# Patient Record
Sex: MALE | Race: WHITE | NOT HISPANIC OR LATINO | Employment: OTHER
[De-identification: names, ages, dates, MRNs, and addresses within clinical notes are randomized per-mention and may not be internally consistent; named-entity substitution may affect disease eponyms.]

---

## 2019-01-01 ENCOUNTER — EXTERNAL RECORD (OUTPATIENT)
Dept: HEALTH INFORMATION MANAGEMENT | Facility: OTHER | Age: 79
End: 2019-01-01

## 2019-05-14 ENCOUNTER — TELEPHONE (OUTPATIENT)
Dept: NEUROSURGERY | Age: 79
End: 2019-05-14

## 2019-05-20 ENCOUNTER — TELEPHONE (OUTPATIENT)
Dept: NEUROSURGERY | Age: 79
End: 2019-05-20

## 2019-05-20 ENCOUNTER — OFFICE VISIT (OUTPATIENT)
Dept: NEUROSURGERY | Age: 79
End: 2019-05-20

## 2019-05-20 VITALS
HEIGHT: 67 IN | WEIGHT: 200 LBS | RESPIRATION RATE: 16 BRPM | BODY MASS INDEX: 31.39 KG/M2 | SYSTOLIC BLOOD PRESSURE: 149 MMHG | HEART RATE: 55 BPM | DIASTOLIC BLOOD PRESSURE: 78 MMHG | OXYGEN SATURATION: 100 %

## 2019-05-20 DIAGNOSIS — M48.061 SPINAL STENOSIS OF LUMBAR REGION WITHOUT NEUROGENIC CLAUDICATION: Primary | ICD-10-CM

## 2019-05-20 DIAGNOSIS — M51.36 DDD (DEGENERATIVE DISC DISEASE), LUMBAR: Primary | ICD-10-CM

## 2019-05-20 DIAGNOSIS — M54.30 SCIATICA, UNSPECIFIED LATERALITY: ICD-10-CM

## 2019-05-20 DIAGNOSIS — M54.16 LUMBAR RADICULOPATHY: Primary | ICD-10-CM

## 2019-05-20 PROBLEM — F32.A DEPRESSION: Status: ACTIVE | Noted: 2019-05-20

## 2019-05-20 PROBLEM — E03.9 HYPOTHYROIDISM: Status: ACTIVE | Noted: 2019-05-20

## 2019-05-20 PROBLEM — I10 ESSENTIAL HYPERTENSION: Status: ACTIVE | Noted: 2019-05-20

## 2019-05-20 PROBLEM — E11.9 TYPE 2 DIABETES MELLITUS (CMD): Status: ACTIVE | Noted: 2019-05-20

## 2019-05-20 PROBLEM — E78.5 HYPERLIPIDEMIA: Status: ACTIVE | Noted: 2019-05-20

## 2019-05-20 PROCEDURE — 99202 OFFICE O/P NEW SF 15 MIN: CPT | Performed by: NEUROLOGICAL SURGERY

## 2019-05-20 RX ORDER — LEVOTHYROXINE SODIUM 0.05 MG/1
1 TABLET ORAL
COMMUNITY
Start: 2018-04-08

## 2019-05-20 RX ORDER — CARVEDILOL 12.5 MG/1
12.5 TABLET ORAL 2 TIMES DAILY WITH MEALS
COMMUNITY
Start: 2017-06-30

## 2019-05-20 RX ORDER — PANTOPRAZOLE SODIUM 20 MG/1
20 TABLET, DELAYED RELEASE ORAL DAILY
COMMUNITY

## 2019-05-20 RX ORDER — LOSARTAN POTASSIUM 100 MG/1
100 TABLET ORAL DAILY
COMMUNITY
Start: 2014-10-31

## 2019-05-20 RX ORDER — MIRTAZAPINE 15 MG/1
7.5 TABLET, FILM COATED ORAL NIGHTLY
COMMUNITY
Start: 2019-07-02

## 2019-05-20 RX ORDER — HYDROCODONE BITARTRATE AND ACETAMINOPHEN 10; 325 MG/1; MG/1
TABLET ORAL
COMMUNITY
Start: 2019-05-15 | End: 2021-01-05 | Stop reason: ALTCHOICE

## 2019-05-20 RX ORDER — METFORMIN HYDROCHLORIDE 500 MG/1
500 TABLET, EXTENDED RELEASE ORAL
COMMUNITY
Start: 2018-03-22 | End: 2021-01-05 | Stop reason: ALTCHOICE

## 2019-05-20 RX ORDER — PRAVASTATIN SODIUM 40 MG
40 TABLET ORAL AT BEDTIME
COMMUNITY
Start: 2014-10-31

## 2019-05-20 RX ORDER — FLUOXETINE HYDROCHLORIDE 20 MG/1
40 CAPSULE ORAL DAILY
COMMUNITY
Start: 2019-07-02

## 2019-05-20 RX ORDER — TAMSULOSIN HYDROCHLORIDE 0.4 MG/1
0.4 CAPSULE ORAL AT BEDTIME
COMMUNITY
Start: 2017-06-30

## 2019-05-20 RX ORDER — HYDROCHLOROTHIAZIDE 25 MG/1
TABLET ORAL
COMMUNITY
Start: 2015-04-09

## 2019-05-22 ENCOUNTER — OFFICE VISIT (OUTPATIENT)
Dept: NEUROLOGY | Age: 79
End: 2019-05-22

## 2019-05-22 VITALS — DIASTOLIC BLOOD PRESSURE: 76 MMHG | SYSTOLIC BLOOD PRESSURE: 119 MMHG

## 2019-05-22 DIAGNOSIS — G62.9 POLYNEUROPATHY: Primary | ICD-10-CM

## 2019-05-22 DIAGNOSIS — G54.1 LUMBOSACRAL PLEXOPATHY: ICD-10-CM

## 2019-05-22 DIAGNOSIS — M54.16 LUMBAR RADICULOPATHY: ICD-10-CM

## 2019-05-22 PROCEDURE — 95910 NRV CNDJ TEST 7-8 STUDIES: CPT | Performed by: PSYCHIATRY & NEUROLOGY

## 2019-05-22 PROCEDURE — 95886 MUSC TEST DONE W/N TEST COMP: CPT | Performed by: PSYCHIATRY & NEUROLOGY

## 2019-05-23 ENCOUNTER — TELEPHONE (OUTPATIENT)
Dept: NEUROSURGERY | Age: 79
End: 2019-05-23

## 2019-05-24 ENCOUNTER — TELEPHONE (OUTPATIENT)
Dept: NEUROSURGERY | Age: 79
End: 2019-05-24

## 2019-05-24 DIAGNOSIS — M54.16 LEFT LUMBAR RADICULOPATHY: Primary | ICD-10-CM

## 2019-05-24 DIAGNOSIS — Z01.818 PRE-PROCEDURAL EXAMINATION: ICD-10-CM

## 2019-05-24 DIAGNOSIS — M79.605 LEFT LEG PAIN: ICD-10-CM

## 2019-05-28 ENCOUNTER — TELEPHONE (OUTPATIENT)
Dept: NEUROSURGERY | Age: 79
End: 2019-05-28

## 2019-05-29 ENCOUNTER — TELEPHONE (OUTPATIENT)
Dept: NEUROSURGERY | Age: 79
End: 2019-05-29

## 2019-06-03 ENCOUNTER — OFFICE VISIT (OUTPATIENT)
Dept: NEUROSURGERY | Age: 79
End: 2019-06-03

## 2019-06-03 VITALS
DIASTOLIC BLOOD PRESSURE: 100 MMHG | HEIGHT: 67 IN | RESPIRATION RATE: 16 BRPM | BODY MASS INDEX: 31.39 KG/M2 | WEIGHT: 200 LBS | OXYGEN SATURATION: 98 % | HEART RATE: 53 BPM | SYSTOLIC BLOOD PRESSURE: 183 MMHG

## 2019-06-03 DIAGNOSIS — M54.16 LUMBAR RADICULOPATHY: Primary | ICD-10-CM

## 2019-06-03 PROCEDURE — 99212 OFFICE O/P EST SF 10 MIN: CPT | Performed by: NEUROLOGICAL SURGERY

## 2019-06-10 ENCOUNTER — TELEPHONE (OUTPATIENT)
Dept: NEUROSURGERY | Age: 79
End: 2019-06-10

## 2019-06-14 ENCOUNTER — HOSPITAL (OUTPATIENT)
Dept: OTHER | Age: 79
End: 2019-06-14

## 2019-06-14 LAB
GLUCOSE BLDC GLUCOMTR-MCNC: 91 MG/DL (ref 65–99)
GLUCOSE BLDC GLUCOMTR-MCNC: NORMAL MG/DL

## 2019-06-14 PROCEDURE — 63047 LAM FACETEC & FORAMOT LUMBAR: CPT | Performed by: PHYSICIAN ASSISTANT

## 2019-06-14 PROCEDURE — 22633 ARTHRD CMBN 1NTRSPC LUMBAR: CPT | Performed by: NEUROLOGICAL SURGERY

## 2019-06-14 PROCEDURE — 99222 1ST HOSP IP/OBS MODERATE 55: CPT | Performed by: PHYSICAL MEDICINE & REHABILITATION

## 2019-06-14 PROCEDURE — 22842 INSERT SPINE FIXATION DEVICE: CPT | Performed by: PHYSICIAN ASSISTANT

## 2019-06-14 PROCEDURE — 22853 INSJ BIOMECHANICAL DEVICE: CPT | Performed by: PHYSICIAN ASSISTANT

## 2019-06-14 PROCEDURE — 99223 1ST HOSP IP/OBS HIGH 75: CPT | Performed by: INTERNAL MEDICINE

## 2019-06-14 PROCEDURE — 61783 SCAN PROC SPINAL: CPT | Performed by: NEUROLOGICAL SURGERY

## 2019-06-14 PROCEDURE — 22634 ARTHRD CMBN 1NTRSPC EA ADDL: CPT | Performed by: NEUROLOGICAL SURGERY

## 2019-06-14 PROCEDURE — 63047 LAM FACETEC & FORAMOT LUMBAR: CPT | Performed by: NEUROLOGICAL SURGERY

## 2019-06-14 PROCEDURE — 63048 LAM FACETEC &FORAMOT EA ADDL: CPT | Performed by: NEUROLOGICAL SURGERY

## 2019-06-14 PROCEDURE — 63048 LAM FACETEC &FORAMOT EA ADDL: CPT | Performed by: PHYSICIAN ASSISTANT

## 2019-06-14 PROCEDURE — 22633 ARTHRD CMBN 1NTRSPC LUMBAR: CPT | Performed by: PHYSICIAN ASSISTANT

## 2019-06-14 PROCEDURE — 22634 ARTHRD CMBN 1NTRSPC EA ADDL: CPT | Performed by: PHYSICIAN ASSISTANT

## 2019-06-14 PROCEDURE — 22853 INSJ BIOMECHANICAL DEVICE: CPT | Performed by: NEUROLOGICAL SURGERY

## 2019-06-14 PROCEDURE — 99024 POSTOP FOLLOW-UP VISIT: CPT | Performed by: PHYSICIAN ASSISTANT

## 2019-06-14 PROCEDURE — 22842 INSERT SPINE FIXATION DEVICE: CPT | Performed by: NEUROLOGICAL SURGERY

## 2019-06-14 PROCEDURE — 20937 SP BONE AGRFT MORSEL ADD-ON: CPT | Performed by: NEUROLOGICAL SURGERY

## 2019-06-15 LAB
ANALYZER ANC (IANC): ABNORMAL
ANION GAP SERPL CALC-SCNC: 7 MMOL/L (ref 10–20)
BUN SERPL-MCNC: 25 MG/DL (ref 6–20)
BUN/CREAT SERPL: 19 (ref 7–25)
CALCIUM SERPL-MCNC: 8.2 MG/DL (ref 8.4–10.2)
CHLORIDE SERPL-SCNC: 104 MMOL/L (ref 98–107)
CO2 SERPL-SCNC: 32 MMOL/L (ref 21–32)
CREAT SERPL-MCNC: 1.3 MG/DL (ref 0.67–1.17)
ERYTHROCYTE [DISTWIDTH] IN BLOOD: 13.2 % (ref 11–15)
GLUCOSE SERPL-MCNC: 103 MG/DL (ref 65–99)
HCT VFR BLD CALC: 39.2 % (ref 39–51)
HGB BLD-MCNC: 12.5 G/DL (ref 13–17)
MCH RBC QN AUTO: 30.1 PG (ref 26–34)
MCHC RBC AUTO-ENTMCNC: 31.9 G/DL (ref 32–36.5)
MCV RBC AUTO: 94.5 FL (ref 78–100)
NRBC (NRBCRE): 0 /100 WBC
PLATELET # BLD: 121 K/MCL (ref 140–450)
POTASSIUM SERPL-SCNC: 4.6 MMOL/L (ref 3.4–5.1)
RBC # BLD: 4.15 MIL/MCL (ref 4.5–5.9)
SODIUM SERPL-SCNC: 138 MMOL/L (ref 135–145)
WBC # BLD: 10.9 K/MCL (ref 4.2–11)

## 2019-06-15 PROCEDURE — 99232 SBSQ HOSP IP/OBS MODERATE 35: CPT | Performed by: INTERNAL MEDICINE

## 2019-06-15 PROCEDURE — 99024 POSTOP FOLLOW-UP VISIT: CPT | Performed by: NURSE PRACTITIONER

## 2019-06-16 LAB
ALBUMIN SERPL-MCNC: 2.6 G/DL (ref 3.6–5.1)
ALBUMIN/GLOB SERPL: 0.8 {RATIO} (ref 1–2.4)
ALP SERPL-CCNC: 120 UNITS/L (ref 45–117)
ALT SERPL-CCNC: 17 UNITS/L
ANALYZER ANC (IANC): ABNORMAL
ANION GAP SERPL CALC-SCNC: 9 MMOL/L (ref 10–20)
AST SERPL-CCNC: 25 UNITS/L
BILIRUB SERPL-MCNC: 0.7 MG/DL (ref 0.2–1)
BUN SERPL-MCNC: 25 MG/DL (ref 6–20)
BUN/CREAT SERPL: 23 (ref 7–25)
CALCIUM SERPL-MCNC: 8.8 MG/DL (ref 8.4–10.2)
CHLORIDE SERPL-SCNC: 101 MMOL/L (ref 98–107)
CHOLEST SERPL-MCNC: 147 MG/DL
CHOLEST SERPL-MCNC: ABNORMAL MG/DL
CHOLEST/HDLC SERPL: 4.3 {RATIO}
CO2 SERPL-SCNC: 30 MMOL/L (ref 21–32)
CREAT SERPL-MCNC: 1.07 MG/DL (ref 0.67–1.17)
ERYTHROCYTE [DISTWIDTH] IN BLOOD: 13 % (ref 11–15)
GLOBULIN SER-MCNC: 3.3 G/DL (ref 2–4)
GLUCOSE BLDC GLUCOMTR-MCNC: 118 MG/DL (ref 65–99)
GLUCOSE BLDC GLUCOMTR-MCNC: ABNORMAL MG/DL
GLUCOSE BLDC GLUCOMTR-MCNC: ABNORMAL MG/DL
GLUCOSE SERPL-MCNC: 111 MG/DL (ref 65–99)
HBA1C MFR BLD: 10.0           24 %
HBA1C MFR BLD: 5.4 % (ref 4.5–5.6)
HBA1C MFR BLD: 6.0            126 %
HBA1C MFR BLD: 6.5            14 %
HBA1C MFR BLD: 7.0            154 %
HBA1C MFR BLD: 7.5            169 %
HBA1C MFR BLD: 8.0            183 %
HBA1C MFR BLD: 8.5            197 %
HBA1C MFR BLD: 9.0            212 %
HBA1C MFR BLD: 9.5            226 %
HBA1C MFR BLD: NORMAL %
HCT VFR BLD CALC: 38.4 % (ref 39–51)
HDLC SERPL-MCNC: 34 MG/DL
HDLC SERPL-MCNC: ABNORMAL MG/DL
HGB BLD-MCNC: 12.4 G/DL (ref 13–17)
LDLC SERPL CALC-MCNC: 93 MG/DL
LDLC SERPL CALC-MCNC: ABNORMAL MG/DL
MCH RBC QN AUTO: 29.7 PG (ref 26–34)
MCHC RBC AUTO-ENTMCNC: 32.3 G/DL (ref 32–36.5)
MCV RBC AUTO: 91.9 FL (ref 78–100)
NONHDLC SERPL-MCNC: 113 MG/DL
NONHDLC SERPL-MCNC: ABNORMAL MG/DL
NRBC (NRBCRE): 0 /100 WBC
PLATELET # BLD: 103 K/MCL (ref 140–450)
POTASSIUM SERPL-SCNC: 3.7 MMOL/L (ref 3.4–5.1)
PROT SERPL-MCNC: 5.9 G/DL (ref 6.4–8.2)
RBC # BLD: 4.18 MIL/MCL (ref 4.5–5.9)
SODIUM SERPL-SCNC: 136 MMOL/L (ref 135–145)
TRIGL SERPL-MCNC: 101 MG/DL
TRIGL SERPL-MCNC: ABNORMAL MG/DL
TSH SERPL-ACNC: 1.41 MCUNITS/ML (ref 0.35–5)
WBC # BLD: 10.7 K/MCL (ref 4.2–11)

## 2019-06-16 PROCEDURE — 99024 POSTOP FOLLOW-UP VISIT: CPT | Performed by: PHYSICIAN ASSISTANT

## 2019-06-16 PROCEDURE — 99233 SBSQ HOSP IP/OBS HIGH 50: CPT | Performed by: INTERNAL MEDICINE

## 2019-06-17 ENCOUNTER — TELEPHONE (OUTPATIENT)
Dept: NEUROSURGERY | Age: 79
End: 2019-06-17

## 2019-06-17 ENCOUNTER — TELEPHONE (OUTPATIENT)
Dept: SCHEDULING | Age: 79
End: 2019-06-17

## 2019-06-17 LAB
AMORPH SED URNS QL MICRO: ABNORMAL
ANALYZER ANC (IANC): ABNORMAL
ANION GAP SERPL CALC-SCNC: 8 MMOL/L (ref 10–20)
APPEARANCE UR: CLEAR
BACTERIA #/AREA URNS HPF: ABNORMAL /HPF
BILIRUB UR QL: NEGATIVE
BUN SERPL-MCNC: 21 MG/DL (ref 6–20)
BUN/CREAT SERPL: 20 (ref 7–25)
CALCIUM SERPL-MCNC: 8.6 MG/DL (ref 8.4–10.2)
CAOX CRY URNS QL MICRO: ABNORMAL
CHLORIDE SERPL-SCNC: 102 MMOL/L (ref 98–107)
CO2 SERPL-SCNC: 32 MMOL/L (ref 21–32)
COLOR UR: YELLOW
CREAT SERPL-MCNC: 1.04 MG/DL (ref 0.67–1.17)
EPITH CASTS #/AREA URNS LPF: ABNORMAL /[LPF]
ERYTHROCYTE [DISTWIDTH] IN BLOOD: 13 % (ref 11–15)
FATTY CASTS #/AREA URNS LPF: ABNORMAL /[LPF]
GLUCOSE SERPL-MCNC: 133 MG/DL (ref 65–99)
GLUCOSE UR-MCNC: NEGATIVE MG/DL
GRAN CASTS #/AREA URNS LPF: ABNORMAL /[LPF]
HCT VFR BLD CALC: 34.3 % (ref 39–51)
HGB BLD-MCNC: 11.4 G/DL (ref 13–17)
HGB UR QL: ABNORMAL
HYALINE CASTS #/AREA URNS LPF: ABNORMAL /LPF (ref 0–5)
KETONES UR-MCNC: NEGATIVE MG/DL
LEUKOCYTE ESTERASE UR QL STRIP: NEGATIVE
MCH RBC QN AUTO: 30.2 PG (ref 26–34)
MCHC RBC AUTO-ENTMCNC: 33.2 G/DL (ref 32–36.5)
MCV RBC AUTO: 90.7 FL (ref 78–100)
MIXED CELL CASTS #/AREA URNS LPF: ABNORMAL /[LPF]
MUCOUS THREADS URNS QL MICRO: PRESENT
NITRITE UR QL: NEGATIVE
NRBC (NRBCRE): 0 /100 WBC
PH UR: 8 UNITS (ref 5–7)
PLATELET # BLD: 111 K/MCL (ref 140–450)
POTASSIUM SERPL-SCNC: 3.7 MMOL/L (ref 3.4–5.1)
PROT UR QL: NEGATIVE MG/DL
RBC # BLD: 3.78 MIL/MCL (ref 4.5–5.9)
RBC #/AREA URNS HPF: ABNORMAL /HPF (ref 0–2)
RBC CASTS #/AREA URNS LPF: ABNORMAL /[LPF]
RENAL EPI CELLS #/AREA URNS HPF: ABNORMAL /[HPF]
SODIUM SERPL-SCNC: 138 MMOL/L (ref 135–145)
SP GR UR: 1.01 (ref 1–1.03)
SPECIMEN SOURCE: ABNORMAL
SPERM URNS QL MICRO: ABNORMAL
SQUAMOUS #/AREA URNS HPF: ABNORMAL /HPF (ref 0–5)
T VAGINALIS URNS QL MICRO: ABNORMAL
TRI-PHOS CRY URNS QL MICRO: ABNORMAL
URATE CRY URNS QL MICRO: ABNORMAL
URINE REFLEX: ABNORMAL
URNS CMNT MICRO: ABNORMAL
UROBILINOGEN UR QL: 0.2 MG/DL (ref 0–1)
WAXY CASTS #/AREA URNS LPF: ABNORMAL /[LPF]
WBC # BLD: 10.2 K/MCL (ref 4.2–11)
WBC #/AREA URNS HPF: ABNORMAL /HPF (ref 0–5)
WBC CASTS #/AREA URNS LPF: ABNORMAL /[LPF]
YEAST HYPHAE URNS QL MICRO: ABNORMAL
YEAST URNS QL MICRO: ABNORMAL

## 2019-06-17 PROCEDURE — 99232 SBSQ HOSP IP/OBS MODERATE 35: CPT | Performed by: NURSE PRACTITIONER

## 2019-06-17 PROCEDURE — 99024 POSTOP FOLLOW-UP VISIT: CPT | Performed by: PHYSICIAN ASSISTANT

## 2019-06-17 PROCEDURE — 99233 SBSQ HOSP IP/OBS HIGH 50: CPT | Performed by: INTERNAL MEDICINE

## 2019-06-18 LAB
ALBUMIN SERPL-MCNC: 2.5 G/DL (ref 3.6–5.1)
ALP SERPL-CCNC: 109 UNITS/L (ref 45–117)
ALT SERPL-CCNC: 12 UNITS/L
ANALYZER ANC (IANC): ABNORMAL
ANION GAP SERPL CALC-SCNC: 10 MMOL/L (ref 10–20)
AST SERPL-CCNC: 22 UNITS/L
BILIRUB CONJ SERPL-MCNC: 0.1 MG/DL (ref 0–0.2)
BILIRUB SERPL-MCNC: 0.8 MG/DL (ref 0.2–1)
BUN SERPL-MCNC: 25 MG/DL (ref 6–20)
BUN/CREAT SERPL: 25 (ref 7–25)
CALCIUM SERPL-MCNC: 9.1 MG/DL (ref 8.4–10.2)
CHLORIDE SERPL-SCNC: 100 MMOL/L (ref 98–107)
CO2 SERPL-SCNC: 31 MMOL/L (ref 21–32)
CREAT SERPL-MCNC: 0.99 MG/DL (ref 0.67–1.17)
ERYTHROCYTE [DISTWIDTH] IN BLOOD: 13.2 % (ref 11–15)
GLUCOSE SERPL-MCNC: 114 MG/DL (ref 65–99)
HCT VFR BLD CALC: 37.5 % (ref 39–51)
HGB BLD-MCNC: 12.4 G/DL (ref 13–17)
MCH RBC QN AUTO: 30.1 PG (ref 26–34)
MCHC RBC AUTO-ENTMCNC: 33.1 G/DL (ref 32–36.5)
MCV RBC AUTO: 91 FL (ref 78–100)
NRBC (NRBCRE): 0 /100 WBC
PLATELET # BLD: 171 K/MCL (ref 140–450)
POTASSIUM SERPL-SCNC: 3.5 MMOL/L (ref 3.4–5.1)
PROT SERPL-MCNC: 6.8 G/DL (ref 6.4–8.2)
RBC # BLD: 4.12 MIL/MCL (ref 4.5–5.9)
SODIUM SERPL-SCNC: 137 MMOL/L (ref 135–145)
WBC # BLD: 9.5 K/MCL (ref 4.2–11)

## 2019-06-18 PROCEDURE — 99232 SBSQ HOSP IP/OBS MODERATE 35: CPT | Performed by: INTERNAL MEDICINE

## 2019-06-19 LAB
ANALYZER ANC (IANC): ABNORMAL
ANION GAP SERPL CALC-SCNC: 8 MMOL/L (ref 10–20)
BUN SERPL-MCNC: 25 MG/DL (ref 6–20)
BUN/CREAT SERPL: 24 (ref 7–25)
CALCIUM SERPL-MCNC: 9.2 MG/DL (ref 8.4–10.2)
CHLORIDE SERPL-SCNC: 99 MMOL/L (ref 98–107)
CO2 SERPL-SCNC: 31 MMOL/L (ref 21–32)
CREAT SERPL-MCNC: 1.04 MG/DL (ref 0.67–1.17)
ERYTHROCYTE [DISTWIDTH] IN BLOOD: 13.2 % (ref 11–15)
GLUCOSE SERPL-MCNC: 108 MG/DL (ref 65–99)
HCT VFR BLD CALC: 38 % (ref 39–51)
HGB BLD-MCNC: 12.4 G/DL (ref 13–17)
MAGNESIUM SERPL-MCNC: 2.7 MG/DL (ref 1.7–2.4)
MCH RBC QN AUTO: 29.6 PG (ref 26–34)
MCHC RBC AUTO-ENTMCNC: 32.6 G/DL (ref 32–36.5)
MCV RBC AUTO: 90.7 FL (ref 78–100)
NRBC (NRBCRE): 0 /100 WBC
PLATELET # BLD: 193 K/MCL (ref 140–450)
POTASSIUM SERPL-SCNC: 3.3 MMOL/L (ref 3.4–5.1)
RBC # BLD: 4.19 MIL/MCL (ref 4.5–5.9)
SODIUM SERPL-SCNC: 135 MMOL/L (ref 135–145)
WBC # BLD: 8.2 K/MCL (ref 4.2–11)

## 2019-06-19 PROCEDURE — 99232 SBSQ HOSP IP/OBS MODERATE 35: CPT | Performed by: INTERNAL MEDICINE

## 2019-06-19 PROCEDURE — 99233 SBSQ HOSP IP/OBS HIGH 50: CPT | Performed by: NURSE PRACTITIONER

## 2019-06-20 LAB
CREAT SERPL-MCNC: 1.14 MG/DL (ref 0.67–1.17)
POTASSIUM SERPL-SCNC: 3.7 MMOL/L (ref 3.4–5.1)

## 2019-06-20 PROCEDURE — 99239 HOSP IP/OBS DSCHRG MGMT >30: CPT | Performed by: INTERNAL MEDICINE

## 2019-06-20 PROCEDURE — 99232 SBSQ HOSP IP/OBS MODERATE 35: CPT | Performed by: PHYSICAL MEDICINE & REHABILITATION

## 2019-06-20 PROCEDURE — 99223 1ST HOSP IP/OBS HIGH 75: CPT | Performed by: PHYSICAL MEDICINE & REHABILITATION

## 2019-06-20 PROCEDURE — 99223 1ST HOSP IP/OBS HIGH 75: CPT | Performed by: INTERNAL MEDICINE

## 2019-06-21 ENCOUNTER — TELEPHONE (OUTPATIENT)
Dept: NEUROSURGERY | Age: 79
End: 2019-06-21

## 2019-06-21 LAB
ANALYZER ANC (IANC): ABNORMAL
ANION GAP SERPL CALC-SCNC: 8 MMOL/L (ref 10–20)
BASOPHILS # BLD: 0 K/MCL (ref 0–0.3)
BASOPHILS NFR BLD: 0 %
BUN SERPL-MCNC: 27 MG/DL (ref 6–20)
BUN/CREAT SERPL: 26 (ref 7–25)
CALCIUM SERPL-MCNC: 8.9 MG/DL (ref 8.4–10.2)
CHLORIDE SERPL-SCNC: 100 MMOL/L (ref 98–107)
CO2 SERPL-SCNC: 32 MMOL/L (ref 21–32)
CREAT SERPL-MCNC: 1.04 MG/DL (ref 0.67–1.17)
DIFFERENTIAL METHOD BLD: ABNORMAL
EOSINOPHIL # BLD: 0.4 K/MCL (ref 0.1–0.5)
EOSINOPHIL NFR BLD: 5 %
ERYTHROCYTE [DISTWIDTH] IN BLOOD: 13.2 % (ref 11–15)
GLUCOSE SERPL-MCNC: 104 MG/DL (ref 65–99)
HCT VFR BLD CALC: 37.7 % (ref 39–51)
HGB BLD-MCNC: 11.8 G/DL (ref 13–17)
IMM GRANULOCYTES # BLD AUTO: 0.1 K/MCL (ref 0–0.2)
IMM GRANULOCYTES NFR BLD: 1 %
LYMPHOCYTES # BLD: 1.6 K/MCL (ref 1–4)
LYMPHOCYTES NFR BLD: 20 %
MCH RBC QN AUTO: 29.1 PG (ref 26–34)
MCHC RBC AUTO-ENTMCNC: 31.3 G/DL (ref 32–36.5)
MCV RBC AUTO: 93.1 FL (ref 78–100)
MONOCYTES # BLD: 1 K/MCL (ref 0.3–0.9)
MONOCYTES NFR BLD: 12 %
NEUTROPHILS # BLD: 5 K/MCL (ref 1.8–7.7)
NEUTROPHILS NFR BLD: 62 %
NEUTS SEG NFR BLD: ABNORMAL %
NRBC (NRBCRE): 0 /100 WBC
PLATELET # BLD: 221 K/MCL (ref 140–450)
POTASSIUM SERPL-SCNC: 3.5 MMOL/L (ref 3.4–5.1)
RBC # BLD: 4.05 MIL/MCL (ref 4.5–5.9)
SODIUM SERPL-SCNC: 137 MMOL/L (ref 135–145)
WBC # BLD: 8.2 K/MCL (ref 4.2–11)

## 2019-06-21 PROCEDURE — 99233 SBSQ HOSP IP/OBS HIGH 50: CPT | Performed by: SPECIALIST

## 2019-06-21 PROCEDURE — 99233 SBSQ HOSP IP/OBS HIGH 50: CPT | Performed by: INTERNAL MEDICINE

## 2019-06-22 LAB
GLUCOSE BLDC GLUCOMTR-MCNC: 110 MG/DL (ref 65–99)
GLUCOSE BLDC GLUCOMTR-MCNC: 89 MG/DL (ref 65–99)

## 2019-06-22 PROCEDURE — 99231 SBSQ HOSP IP/OBS SF/LOW 25: CPT | Performed by: SPECIALIST

## 2019-06-22 PROCEDURE — 99232 SBSQ HOSP IP/OBS MODERATE 35: CPT | Performed by: INTERNAL MEDICINE

## 2019-06-23 LAB
GLUCOSE BLDC GLUCOMTR-MCNC: 103 MG/DL (ref 65–99)
GLUCOSE BLDC GLUCOMTR-MCNC: 107 MG/DL (ref 65–99)
GLUCOSE BLDC GLUCOMTR-MCNC: 135 MG/DL (ref 65–99)
GLUCOSE BLDC GLUCOMTR-MCNC: 142 MG/DL (ref 65–99)
GLUCOSE BLDC GLUCOMTR-MCNC: ABNORMAL MG/DL

## 2019-06-23 PROCEDURE — 99232 SBSQ HOSP IP/OBS MODERATE 35: CPT | Performed by: SPECIALIST

## 2019-06-24 LAB
GLUCOSE BLDC GLUCOMTR-MCNC: 91 MG/DL (ref 65–99)
GLUCOSE BLDC GLUCOMTR-MCNC: 93 MG/DL (ref 65–99)

## 2019-06-24 PROCEDURE — 99232 SBSQ HOSP IP/OBS MODERATE 35: CPT | Performed by: INTERNAL MEDICINE

## 2019-06-24 PROCEDURE — 99233 SBSQ HOSP IP/OBS HIGH 50: CPT | Performed by: PHYSICAL MEDICINE & REHABILITATION

## 2019-06-25 LAB
ANALYZER ANC (IANC): ABNORMAL
ANION GAP SERPL CALC-SCNC: 10 MMOL/L (ref 10–20)
BUN SERPL-MCNC: 34 MG/DL (ref 6–20)
BUN/CREAT SERPL: 26 (ref 7–25)
CALCIUM SERPL-MCNC: 8.5 MG/DL (ref 8.4–10.2)
CHLORIDE SERPL-SCNC: 105 MMOL/L (ref 98–107)
CO2 SERPL-SCNC: 27 MMOL/L (ref 21–32)
CREAT SERPL-MCNC: 1.3 MG/DL (ref 0.67–1.17)
ERYTHROCYTE [DISTWIDTH] IN BLOOD: 13 % (ref 11–15)
GLUCOSE BLDC GLUCOMTR-MCNC: 109 MG/DL (ref 65–99)
GLUCOSE BLDC GLUCOMTR-MCNC: 93 MG/DL (ref 65–99)
GLUCOSE BLDC GLUCOMTR-MCNC: NORMAL MG/DL
GLUCOSE SERPL-MCNC: 96 MG/DL (ref 65–99)
HCT VFR BLD CALC: 37.1 % (ref 39–51)
HGB BLD-MCNC: 11.4 G/DL (ref 13–17)
MCH RBC QN AUTO: 29.1 PG (ref 26–34)
MCHC RBC AUTO-ENTMCNC: 30.7 G/DL (ref 32–36.5)
MCV RBC AUTO: 94.6 FL (ref 78–100)
NRBC (NRBCRE): 0 /100 WBC
PLATELET # BLD: 280 K/MCL (ref 140–450)
POTASSIUM SERPL-SCNC: 3.7 MMOL/L (ref 3.4–5.1)
RBC # BLD: 3.92 MIL/MCL (ref 4.5–5.9)
SODIUM SERPL-SCNC: 138 MMOL/L (ref 135–145)
WBC # BLD: 9.5 K/MCL (ref 4.2–11)

## 2019-06-25 PROCEDURE — 99232 SBSQ HOSP IP/OBS MODERATE 35: CPT | Performed by: INTERNAL MEDICINE

## 2019-06-25 PROCEDURE — 99232 SBSQ HOSP IP/OBS MODERATE 35: CPT | Performed by: PHYSICAL MEDICINE & REHABILITATION

## 2019-06-26 ENCOUNTER — APPOINTMENT (OUTPATIENT)
Dept: NEUROSURGERY | Age: 79
End: 2019-06-26

## 2019-06-26 LAB
GLUCOSE BLDC GLUCOMTR-MCNC: 93 MG/DL (ref 65–99)
GLUCOSE BLDC GLUCOMTR-MCNC: 94 MG/DL (ref 65–99)

## 2019-06-26 PROCEDURE — 99232 SBSQ HOSP IP/OBS MODERATE 35: CPT | Performed by: PHYSICAL MEDICINE & REHABILITATION

## 2019-06-27 LAB
GLUCOSE BLDC GLUCOMTR-MCNC: 102 MG/DL (ref 65–99)
GLUCOSE BLDC GLUCOMTR-MCNC: 114 MG/DL (ref 65–99)
GLUCOSE BLDC GLUCOMTR-MCNC: ABNORMAL MG/DL

## 2019-06-27 PROCEDURE — 99232 SBSQ HOSP IP/OBS MODERATE 35: CPT | Performed by: PHYSICAL MEDICINE & REHABILITATION

## 2019-06-28 LAB
GLUCOSE BLDC GLUCOMTR-MCNC: 123 MG/DL (ref 65–99)
GLUCOSE BLDC GLUCOMTR-MCNC: 97 MG/DL (ref 65–99)

## 2019-06-28 PROCEDURE — 99232 SBSQ HOSP IP/OBS MODERATE 35: CPT | Performed by: PHYSICAL MEDICINE & REHABILITATION

## 2019-06-28 PROCEDURE — 99232 SBSQ HOSP IP/OBS MODERATE 35: CPT | Performed by: INTERNAL MEDICINE

## 2019-06-29 LAB
GLUCOSE BLDC GLUCOMTR-MCNC: 97 MG/DL (ref 65–99)
GLUCOSE BLDC GLUCOMTR-MCNC: 97 MG/DL (ref 65–99)
GLUCOSE BLDC GLUCOMTR-MCNC: NORMAL MG/DL

## 2019-06-29 PROCEDURE — 99232 SBSQ HOSP IP/OBS MODERATE 35: CPT | Performed by: PHYSICAL MEDICINE & REHABILITATION

## 2019-06-30 LAB
GLUCOSE BLDC GLUCOMTR-MCNC: 109 MG/DL (ref 65–99)
GLUCOSE BLDC GLUCOMTR-MCNC: 95 MG/DL (ref 65–99)

## 2019-06-30 PROCEDURE — 99232 SBSQ HOSP IP/OBS MODERATE 35: CPT | Performed by: PHYSICAL MEDICINE & REHABILITATION

## 2019-06-30 PROCEDURE — 99232 SBSQ HOSP IP/OBS MODERATE 35: CPT | Performed by: INTERNAL MEDICINE

## 2019-07-01 ENCOUNTER — TELEPHONE (OUTPATIENT)
Dept: NEUROSURGERY | Age: 79
End: 2019-07-01

## 2019-07-01 LAB — GLUCOSE BLDC GLUCOMTR-MCNC: 91 MG/DL (ref 65–99)

## 2019-07-01 PROCEDURE — 99239 HOSP IP/OBS DSCHRG MGMT >30: CPT | Performed by: PHYSICAL MEDICINE & REHABILITATION

## 2019-07-01 PROCEDURE — 99232 SBSQ HOSP IP/OBS MODERATE 35: CPT | Performed by: INTERNAL MEDICINE

## 2019-07-08 ENCOUNTER — TELEPHONE (OUTPATIENT)
Dept: NEUROSURGERY | Age: 79
End: 2019-07-08

## 2019-07-12 ENCOUNTER — TELEPHONE (OUTPATIENT)
Dept: NEUROSURGERY | Age: 79
End: 2019-07-12

## 2019-07-17 ENCOUNTER — OFFICE VISIT (OUTPATIENT)
Dept: NEUROSURGERY | Age: 79
End: 2019-07-17

## 2019-07-17 ENCOUNTER — HOSPITAL (OUTPATIENT)
Dept: OTHER | Age: 79
End: 2019-07-17
Attending: NEUROLOGICAL SURGERY

## 2019-07-17 DIAGNOSIS — Z98.1 S/P LUMBAR SPINAL FUSION: Primary | ICD-10-CM

## 2019-07-17 PROCEDURE — 99024 POSTOP FOLLOW-UP VISIT: CPT | Performed by: NURSE PRACTITIONER

## 2019-07-18 VITALS — SYSTOLIC BLOOD PRESSURE: 131 MMHG | HEART RATE: 89 BPM | DIASTOLIC BLOOD PRESSURE: 78 MMHG

## 2019-07-19 ENCOUNTER — TELEPHONE (OUTPATIENT)
Dept: NEUROSURGERY | Age: 79
End: 2019-07-19

## 2019-07-19 ENCOUNTER — E-ADVICE (OUTPATIENT)
Dept: NEUROSURGERY | Age: 79
End: 2019-07-19

## 2019-07-26 ENCOUNTER — APPOINTMENT (OUTPATIENT)
Dept: NEUROSURGERY | Age: 79
End: 2019-07-26

## 2019-07-29 ENCOUNTER — E-ADVICE (OUTPATIENT)
Dept: NEUROSURGERY | Age: 79
End: 2019-07-29

## 2019-08-09 ENCOUNTER — OFFICE VISIT (OUTPATIENT)
Dept: NEUROSURGERY | Age: 79
End: 2019-08-09

## 2019-08-09 VITALS — HEART RATE: 71 BPM | SYSTOLIC BLOOD PRESSURE: 152 MMHG | DIASTOLIC BLOOD PRESSURE: 80 MMHG | TEMPERATURE: 97.6 F

## 2019-08-09 DIAGNOSIS — Z98.1 S/P LUMBAR FUSION: Primary | ICD-10-CM

## 2019-08-09 PROCEDURE — 99024 POSTOP FOLLOW-UP VISIT: CPT | Performed by: NEUROLOGICAL SURGERY

## 2019-08-27 ENCOUNTER — TELEPHONE (OUTPATIENT)
Dept: NEUROSURGERY | Age: 79
End: 2019-08-27

## 2019-09-11 ENCOUNTER — HOSPITAL (OUTPATIENT)
Dept: OTHER | Age: 79
End: 2019-09-11
Attending: INTERNAL MEDICINE

## 2019-09-11 ENCOUNTER — HOSPITAL (OUTPATIENT)
Dept: OTHER | Age: 79
End: 2019-09-11
Attending: PHYSICAL MEDICINE & REHABILITATION

## 2019-09-11 ENCOUNTER — HOSPITAL (OUTPATIENT)
Dept: OTHER | Age: 79
End: 2019-09-11
Attending: NEUROLOGICAL SURGERY

## 2019-09-13 ENCOUNTER — TELEPHONE (OUTPATIENT)
Dept: SCHEDULING | Age: 79
End: 2019-09-13

## 2019-09-19 ENCOUNTER — APPOINTMENT (OUTPATIENT)
Dept: NEUROSURGERY | Age: 79
End: 2019-09-19

## 2019-09-26 ENCOUNTER — APPOINTMENT (OUTPATIENT)
Dept: NEUROSURGERY | Age: 79
End: 2019-09-26

## 2019-10-01 ENCOUNTER — TELEPHONE (OUTPATIENT)
Dept: NEUROSURGERY | Age: 79
End: 2019-10-01

## 2019-10-03 ENCOUNTER — APPOINTMENT (OUTPATIENT)
Dept: NEUROSURGERY | Age: 79
End: 2019-10-03

## 2019-10-09 ENCOUNTER — E-ADVICE (OUTPATIENT)
Dept: NEUROSURGERY | Age: 79
End: 2019-10-09

## 2019-10-09 ENCOUNTER — TELEPHONE (OUTPATIENT)
Dept: NEUROSURGERY | Age: 79
End: 2019-10-09

## 2019-10-09 DIAGNOSIS — M25.552 PAIN OF LEFT HIP JOINT: ICD-10-CM

## 2019-10-09 DIAGNOSIS — M54.16 ACUTE LEFT LUMBAR RADICULOPATHY: ICD-10-CM

## 2019-10-09 DIAGNOSIS — Z98.1 S/P LUMBAR FUSION: Primary | ICD-10-CM

## 2019-10-10 ENCOUNTER — TELEPHONE (OUTPATIENT)
Dept: NEUROSURGERY | Age: 79
End: 2019-10-10

## 2019-10-11 ENCOUNTER — OFFICE VISIT (OUTPATIENT)
Dept: NEUROSURGERY | Age: 79
End: 2019-10-11

## 2019-10-11 VITALS — SYSTOLIC BLOOD PRESSURE: 205 MMHG | DIASTOLIC BLOOD PRESSURE: 100 MMHG

## 2019-10-11 DIAGNOSIS — M54.16 LUMBAR RADICULOPATHY: ICD-10-CM

## 2019-10-11 DIAGNOSIS — Z98.1 S/P LUMBAR FUSION: Primary | ICD-10-CM

## 2019-10-11 PROCEDURE — 99213 OFFICE O/P EST LOW 20 MIN: CPT | Performed by: NEUROLOGICAL SURGERY

## 2019-10-11 RX ORDER — METHYLPREDNISOLONE 4 MG/1
4 TABLET ORAL SEE ADMIN INSTRUCTIONS
Qty: 21 TABLET | Refills: 0 | Status: SHIPPED | OUTPATIENT
Start: 2019-10-11 | End: 2021-01-05 | Stop reason: ALTCHOICE

## 2019-10-17 ENCOUNTER — TELEPHONE (OUTPATIENT)
Dept: NEUROSURGERY | Age: 79
End: 2019-10-17

## 2019-10-18 ENCOUNTER — TELEPHONE (OUTPATIENT)
Dept: NEUROSURGERY | Age: 79
End: 2019-10-18

## 2019-10-29 ENCOUNTER — TELEPHONE (OUTPATIENT)
Dept: NEUROSURGERY | Age: 79
End: 2019-10-29

## 2019-10-29 ENCOUNTER — E-ADVICE (OUTPATIENT)
Dept: NEUROSURGERY | Age: 79
End: 2019-10-29

## 2019-11-11 ENCOUNTER — TELEPHONE (OUTPATIENT)
Dept: NEUROSURGERY | Age: 79
End: 2019-11-11

## 2019-12-30 ENCOUNTER — LAB ENCOUNTER (OUTPATIENT)
Dept: LAB | Facility: HOSPITAL | Age: 79
End: 2019-12-30
Attending: ORTHOPAEDIC SURGERY
Payer: MEDICARE

## 2019-12-30 DIAGNOSIS — Z01.818 PREOPERATIVE TESTING: ICD-10-CM

## 2019-12-30 LAB
ANTIBODY SCREEN: NEGATIVE
RH BLOOD TYPE: POSITIVE

## 2019-12-30 PROCEDURE — 86900 BLOOD TYPING SEROLOGIC ABO: CPT

## 2019-12-30 PROCEDURE — 86850 RBC ANTIBODY SCREEN: CPT

## 2019-12-30 PROCEDURE — 36415 COLL VENOUS BLD VENIPUNCTURE: CPT

## 2019-12-30 PROCEDURE — 86901 BLOOD TYPING SEROLOGIC RH(D): CPT

## 2020-01-02 ENCOUNTER — TELEPHONE (OUTPATIENT)
Dept: NEUROSURGERY | Age: 80
End: 2020-01-02

## 2020-01-02 ENCOUNTER — E-ADVICE (OUTPATIENT)
Dept: NEUROSURGERY | Age: 80
End: 2020-01-02

## 2020-01-06 RX ORDER — VIT A/VIT C/VIT E/ZINC/COPPER 4296-226
CAPSULE ORAL DAILY
COMMUNITY

## 2020-01-06 RX ORDER — LEVOTHYROXINE SODIUM 0.05 MG/1
50 TABLET ORAL
COMMUNITY

## 2020-01-06 RX ORDER — METFORMIN HYDROCHLORIDE 500 MG/1
500 TABLET, EXTENDED RELEASE ORAL
Status: ON HOLD | COMMUNITY
End: 2020-02-04

## 2020-01-06 RX ORDER — DULOXETIN HYDROCHLORIDE 30 MG/1
30 CAPSULE, DELAYED RELEASE ORAL DAILY
COMMUNITY

## 2020-01-06 RX ORDER — LOSARTAN POTASSIUM 100 MG/1
TABLET ORAL DAILY
Status: ON HOLD | COMMUNITY
End: 2020-02-04

## 2020-01-06 RX ORDER — CARVEDILOL 12.5 MG/1
12.5 TABLET ORAL 2 TIMES DAILY WITH MEALS
Status: ON HOLD | COMMUNITY
End: 2020-02-04

## 2020-01-06 RX ORDER — ACETAMINOPHEN 160 MG
2000 TABLET,DISINTEGRATING ORAL DAILY
Status: ON HOLD | COMMUNITY
End: 2020-02-04

## 2020-01-06 RX ORDER — TAMSULOSIN HYDROCHLORIDE 0.4 MG/1
0.8 CAPSULE ORAL DAILY
COMMUNITY

## 2020-01-06 RX ORDER — DONEPEZIL HYDROCHLORIDE 10 MG/1
10 TABLET, FILM COATED ORAL NIGHTLY
COMMUNITY

## 2020-01-06 RX ORDER — PRAVASTATIN SODIUM 40 MG
40 TABLET ORAL NIGHTLY
COMMUNITY

## 2020-01-06 RX ORDER — GARLIC EXTRACT 500 MG
1 CAPSULE ORAL DAILY
Status: ON HOLD | COMMUNITY
End: 2020-02-04

## 2020-01-06 RX ORDER — HYDROCHLOROTHIAZIDE 25 MG/1
25 TABLET ORAL DAILY
Status: ON HOLD | COMMUNITY
End: 2020-02-04

## 2020-01-06 RX ORDER — PANTOPRAZOLE SODIUM 20 MG/1
20 TABLET, DELAYED RELEASE ORAL
Status: ON HOLD | COMMUNITY
End: 2020-02-04

## 2020-01-06 RX ORDER — ONDANSETRON HYDROCHLORIDE 8 MG/1
8 TABLET, FILM COATED ORAL EVERY 8 HOURS PRN
Status: ON HOLD | COMMUNITY
End: 2020-02-04

## 2020-01-06 RX ORDER — OXYCODONE HYDROCHLORIDE 10 MG/1
10 TABLET ORAL EVERY 6 HOURS PRN
Status: ON HOLD | COMMUNITY
End: 2020-02-04

## 2020-01-06 RX ORDER — MIRTAZAPINE 30 MG/1
15 TABLET, FILM COATED ORAL NIGHTLY
Status: ON HOLD | COMMUNITY
End: 2020-02-04

## 2020-01-10 ENCOUNTER — ANESTHESIA EVENT (OUTPATIENT)
Dept: SURGERY | Facility: HOSPITAL | Age: 80
DRG: 003 | End: 2020-01-10
Payer: MEDICARE

## 2020-01-10 ENCOUNTER — APPOINTMENT (OUTPATIENT)
Dept: GENERAL RADIOLOGY | Facility: HOSPITAL | Age: 80
DRG: 003 | End: 2020-01-10
Attending: NURSE PRACTITIONER
Payer: MEDICARE

## 2020-01-10 ENCOUNTER — HOSPITAL ENCOUNTER (INPATIENT)
Facility: HOSPITAL | Age: 80
LOS: 25 days | Discharge: NURSING, REHAB OR LONG-TERM CARE | DRG: 003 | End: 2020-02-04
Attending: ORTHOPAEDIC SURGERY | Admitting: ORTHOPAEDIC SURGERY
Payer: MEDICARE

## 2020-01-10 ENCOUNTER — ANESTHESIA (OUTPATIENT)
Dept: SURGERY | Facility: HOSPITAL | Age: 80
DRG: 003 | End: 2020-01-10
Payer: MEDICARE

## 2020-01-10 DIAGNOSIS — Z96.649 INFECTION OF PROSTHETIC TOTAL HIP JOINT, INITIAL ENCOUNTER (HCC): Primary | ICD-10-CM

## 2020-01-10 DIAGNOSIS — T84.59XA INFECTION OF PROSTHETIC TOTAL HIP JOINT, INITIAL ENCOUNTER (HCC): Primary | ICD-10-CM

## 2020-01-10 LAB
ANION GAP SERPL CALC-SCNC: 3 MMOL/L (ref 0–18)
BASOPHILS NFR FLD: 0 %
BUN BLD-MCNC: 28 MG/DL (ref 7–18)
BUN/CREAT SERPL: 17.3 (ref 10–20)
CALCIUM BLD-MCNC: 7.8 MG/DL (ref 8.5–10.1)
CHLORIDE SERPL-SCNC: 109 MMOL/L (ref 98–112)
CO2 SERPL-SCNC: 30 MMOL/L (ref 21–32)
CREAT BLD-MCNC: 1.62 MG/DL (ref 0.7–1.3)
EOSINOPHIL NFR FLD: 0 %
GLUCOSE BLD-MCNC: 117 MG/DL (ref 70–99)
GLUCOSE BLDC GLUCOMTR-MCNC: 122 MG/DL (ref 70–99)
GLUCOSE BLDC GLUCOMTR-MCNC: 125 MG/DL (ref 70–99)
GLUCOSE BLDC GLUCOMTR-MCNC: 162 MG/DL (ref 70–99)
GLUCOSE BLDC GLUCOMTR-MCNC: 95 MG/DL (ref 70–99)
LYMPHOCYTES NFR FLD: 21 %
MONOCYTES NFR FLD: 5 %
NEUTROPHILS NFR FLD: 74 %
OSMOLALITY SERPL CALC.SUM OF ELEC: 301 MOSM/KG (ref 275–295)
POTASSIUM SERPL-SCNC: 3.8 MMOL/L (ref 3.5–5.1)
RBC # FLD: ABNORMAL /CUMM (ref ?–1)
SODIUM SERPL-SCNC: 142 MMOL/L (ref 136–145)
WBC # FLD: 8011 /CUMM

## 2020-01-10 PROCEDURE — 0SPB0JZ REMOVAL OF SYNTHETIC SUBSTITUTE FROM LEFT HIP JOINT, OPEN APPROACH: ICD-10-PCS | Performed by: ORTHOPAEDIC SURGERY

## 2020-01-10 PROCEDURE — 0SRB0J9 REPLACEMENT OF LEFT HIP JOINT WITH SYNTHETIC SUBSTITUTE, CEMENTED, OPEN APPROACH: ICD-10-PCS | Performed by: ORTHOPAEDIC SURGERY

## 2020-01-10 PROCEDURE — 73501 X-RAY EXAM HIP UNI 1 VIEW: CPT | Performed by: NURSE PRACTITIONER

## 2020-01-10 DEVICE — REFOBACIN BC R 1X40 US: Type: IMPLANTABLE DEVICE | Site: HIP | Status: FUNCTIONAL

## 2020-01-10 DEVICE — IMPLANTABLE DEVICE: Type: IMPLANTABLE DEVICE | Site: HIP | Status: FUNCTIONAL

## 2020-01-10 DEVICE — VERSYS DISTAL CENTRALIZER 11MM: Type: IMPLANTABLE DEVICE | Site: HIP | Status: FUNCTIONAL

## 2020-01-10 DEVICE — BONE SCREW 6.5X15 SELF-TAP: Type: IMPLANTABLE DEVICE | Site: HIP | Status: FUNCTIONAL

## 2020-01-10 DEVICE — BONE SCREW 6.5X20 SELF-TAP: Type: IMPLANTABLE DEVICE | Site: HIP | Status: FUNCTIONAL

## 2020-01-10 DEVICE — BIOLOX® DELTA, CERAMIC FEMORAL HEAD, XL, Ø 40/+7, TAPER 12/14
Type: IMPLANTABLE DEVICE | Site: HIP | Status: FUNCTIONAL
Brand: BIOLOX® DELTA

## 2020-01-10 DEVICE — BONE SCREW 6.5X35 SELF-TAP: Type: IMPLANTABLE DEVICE | Site: HIP | Status: FUNCTIONAL

## 2020-01-10 DEVICE — BONE SCREW 6.5X30 SELF-TAP: Type: IMPLANTABLE DEVICE | Site: HIP | Status: FUNCTIONAL

## 2020-01-10 RX ORDER — SODIUM CHLORIDE 9 MG/ML
INJECTION, SOLUTION INTRAVENOUS CONTINUOUS PRN
Status: DISCONTINUED | OUTPATIENT
Start: 2020-01-10 | End: 2020-01-10 | Stop reason: SURG

## 2020-01-10 RX ORDER — BUPIVACAINE HYDROCHLORIDE 7.5 MG/ML
INJECTION, SOLUTION INTRASPINAL AS NEEDED
Status: DISCONTINUED | OUTPATIENT
Start: 2020-01-10 | End: 2020-01-10 | Stop reason: SURG

## 2020-01-10 RX ORDER — KETOROLAC TROMETHAMINE 15 MG/ML
15 INJECTION, SOLUTION INTRAMUSCULAR; INTRAVENOUS EVERY 6 HOURS
Status: DISCONTINUED | OUTPATIENT
Start: 2020-01-10 | End: 2020-01-10 | Stop reason: HOSPADM

## 2020-01-10 RX ORDER — HYDROCODONE BITARTRATE AND ACETAMINOPHEN 5; 325 MG/1; MG/1
1 TABLET ORAL EVERY 4 HOURS PRN
Status: DISCONTINUED | OUTPATIENT
Start: 2020-01-10 | End: 2020-01-14

## 2020-01-10 RX ORDER — CEFAZOLIN SODIUM/WATER 2 G/20 ML
2 SYRINGE (ML) INTRAVENOUS ONCE
Status: COMPLETED | OUTPATIENT
Start: 2020-01-10 | End: 2020-01-10

## 2020-01-10 RX ORDER — HYDROCODONE BITARTRATE AND ACETAMINOPHEN 10; 325 MG/1; MG/1
2 TABLET ORAL EVERY 4 HOURS PRN
Status: DISCONTINUED | OUTPATIENT
Start: 2020-01-10 | End: 2020-01-12

## 2020-01-10 RX ORDER — MORPHINE SULFATE 4 MG/ML
4 INJECTION, SOLUTION INTRAMUSCULAR; INTRAVENOUS EVERY 10 MIN PRN
Status: DISCONTINUED | OUTPATIENT
Start: 2020-01-10 | End: 2020-01-10 | Stop reason: HOSPADM

## 2020-01-10 RX ORDER — SCOLOPAMINE TRANSDERMAL SYSTEM 1 MG/1
1 PATCH, EXTENDED RELEASE TRANSDERMAL ONCE
Status: DISCONTINUED | OUTPATIENT
Start: 2020-01-10 | End: 2020-01-12

## 2020-01-10 RX ORDER — TAMSULOSIN HYDROCHLORIDE 0.4 MG/1
0.8 CAPSULE ORAL NIGHTLY
Status: DISCONTINUED | OUTPATIENT
Start: 2020-01-10 | End: 2020-02-04

## 2020-01-10 RX ORDER — MIRTAZAPINE 15 MG/1
15 TABLET, FILM COATED ORAL NIGHTLY
Status: DISCONTINUED | OUTPATIENT
Start: 2020-01-10 | End: 2020-01-10

## 2020-01-10 RX ORDER — ACETAMINOPHEN 325 MG/1
650 TABLET ORAL EVERY 4 HOURS PRN
Status: DISCONTINUED | OUTPATIENT
Start: 2020-01-10 | End: 2020-01-10

## 2020-01-10 RX ORDER — ONDANSETRON 2 MG/ML
INJECTION INTRAMUSCULAR; INTRAVENOUS AS NEEDED
Status: DISCONTINUED | OUTPATIENT
Start: 2020-01-10 | End: 2020-01-10 | Stop reason: SURG

## 2020-01-10 RX ORDER — PANTOPRAZOLE SODIUM 20 MG/1
20 TABLET, DELAYED RELEASE ORAL
Status: DISCONTINUED | OUTPATIENT
Start: 2020-01-11 | End: 2020-01-14

## 2020-01-10 RX ORDER — SODIUM CHLORIDE, SODIUM LACTATE, POTASSIUM CHLORIDE, CALCIUM CHLORIDE 600; 310; 30; 20 MG/100ML; MG/100ML; MG/100ML; MG/100ML
INJECTION, SOLUTION INTRAVENOUS CONTINUOUS
Status: DISCONTINUED | OUTPATIENT
Start: 2020-01-10 | End: 2020-01-13

## 2020-01-10 RX ORDER — SENNOSIDES 8.6 MG
17.2 TABLET ORAL NIGHTLY
Status: DISCONTINUED | OUTPATIENT
Start: 2020-01-10 | End: 2020-01-16

## 2020-01-10 RX ORDER — HYDROCODONE BITARTRATE AND ACETAMINOPHEN 10; 325 MG/1; MG/1
1 TABLET ORAL EVERY 4 HOURS PRN
Status: DISCONTINUED | OUTPATIENT
Start: 2020-01-10 | End: 2020-01-10

## 2020-01-10 RX ORDER — GLYCOPYRROLATE 0.2 MG/ML
INJECTION, SOLUTION INTRAMUSCULAR; INTRAVENOUS AS NEEDED
Status: DISCONTINUED | OUTPATIENT
Start: 2020-01-10 | End: 2020-01-10 | Stop reason: SURG

## 2020-01-10 RX ORDER — METOCLOPRAMIDE HYDROCHLORIDE 5 MG/ML
10 INJECTION INTRAMUSCULAR; INTRAVENOUS EVERY 6 HOURS PRN
Status: DISCONTINUED | OUTPATIENT
Start: 2020-01-10 | End: 2020-01-10

## 2020-01-10 RX ORDER — EPHEDRINE SULFATE 50 MG/ML
INJECTION, SOLUTION INTRAVENOUS AS NEEDED
Status: DISCONTINUED | OUTPATIENT
Start: 2020-01-10 | End: 2020-01-10 | Stop reason: SURG

## 2020-01-10 RX ORDER — ONDANSETRON 2 MG/ML
4 INJECTION INTRAMUSCULAR; INTRAVENOUS EVERY 4 HOURS PRN
Status: DISCONTINUED | OUTPATIENT
Start: 2020-01-10 | End: 2020-02-04

## 2020-01-10 RX ORDER — FAMOTIDINE 10 MG/ML
20 INJECTION, SOLUTION INTRAVENOUS 2 TIMES DAILY
Status: DISCONTINUED | OUTPATIENT
Start: 2020-01-10 | End: 2020-01-10

## 2020-01-10 RX ORDER — KETOROLAC TROMETHAMINE 30 MG/ML
30 INJECTION, SOLUTION INTRAMUSCULAR; INTRAVENOUS EVERY 6 HOURS
Status: DISCONTINUED | OUTPATIENT
Start: 2020-01-10 | End: 2020-01-10 | Stop reason: HOSPADM

## 2020-01-10 RX ORDER — FAMOTIDINE 20 MG/1
20 TABLET ORAL ONCE
Status: DISCONTINUED | OUTPATIENT
Start: 2020-01-10 | End: 2020-01-10 | Stop reason: HOSPADM

## 2020-01-10 RX ORDER — PHENYLEPHRINE HCL 10 MG/ML
VIAL (ML) INJECTION AS NEEDED
Status: DISCONTINUED | OUTPATIENT
Start: 2020-01-10 | End: 2020-01-10 | Stop reason: SURG

## 2020-01-10 RX ORDER — HYDROCODONE BITARTRATE AND ACETAMINOPHEN 5; 325 MG/1; MG/1
2 TABLET ORAL AS NEEDED
Status: DISCONTINUED | OUTPATIENT
Start: 2020-01-10 | End: 2020-01-10 | Stop reason: HOSPADM

## 2020-01-10 RX ORDER — DEXAMETHASONE SODIUM PHOSPHATE 4 MG/ML
VIAL (ML) INJECTION AS NEEDED
Status: DISCONTINUED | OUTPATIENT
Start: 2020-01-10 | End: 2020-01-10 | Stop reason: SURG

## 2020-01-10 RX ORDER — SODIUM CHLORIDE 0.9 % (FLUSH) 0.9 %
10 SYRINGE (ML) INJECTION AS NEEDED
Status: DISCONTINUED | OUTPATIENT
Start: 2020-01-10 | End: 2020-02-04

## 2020-01-10 RX ORDER — FAMOTIDINE 20 MG/1
20 TABLET ORAL DAILY
Status: DISCONTINUED | OUTPATIENT
Start: 2020-01-10 | End: 2020-01-10

## 2020-01-10 RX ORDER — HYDROMORPHONE HYDROCHLORIDE 1 MG/ML
0.6 INJECTION, SOLUTION INTRAMUSCULAR; INTRAVENOUS; SUBCUTANEOUS EVERY 5 MIN PRN
Status: DISCONTINUED | OUTPATIENT
Start: 2020-01-10 | End: 2020-01-10 | Stop reason: HOSPADM

## 2020-01-10 RX ORDER — MORPHINE SULFATE 10 MG/ML
6 INJECTION, SOLUTION INTRAMUSCULAR; INTRAVENOUS EVERY 10 MIN PRN
Status: DISCONTINUED | OUTPATIENT
Start: 2020-01-10 | End: 2020-01-10 | Stop reason: HOSPADM

## 2020-01-10 RX ORDER — ZOLPIDEM TARTRATE 5 MG/1
5 TABLET ORAL NIGHTLY PRN
Status: DISCONTINUED | OUTPATIENT
Start: 2020-01-10 | End: 2020-01-16

## 2020-01-10 RX ORDER — PROCHLORPERAZINE EDISYLATE 5 MG/ML
10 INJECTION INTRAMUSCULAR; INTRAVENOUS EVERY 6 HOURS PRN
Status: ACTIVE | OUTPATIENT
Start: 2020-01-10 | End: 2020-01-12

## 2020-01-10 RX ORDER — VANCOMYCIN HYDROCHLORIDE
15 EVERY 12 HOURS
Status: DISCONTINUED | OUTPATIENT
Start: 2020-01-10 | End: 2020-01-10

## 2020-01-10 RX ORDER — DONEPEZIL HYDROCHLORIDE 10 MG/1
10 TABLET, FILM COATED ORAL NIGHTLY
Status: DISCONTINUED | OUTPATIENT
Start: 2020-01-10 | End: 2020-02-04

## 2020-01-10 RX ORDER — NALOXONE HYDROCHLORIDE 0.4 MG/ML
80 INJECTION, SOLUTION INTRAMUSCULAR; INTRAVENOUS; SUBCUTANEOUS AS NEEDED
Status: DISCONTINUED | OUTPATIENT
Start: 2020-01-10 | End: 2020-01-10 | Stop reason: HOSPADM

## 2020-01-10 RX ORDER — HALOPERIDOL 5 MG/ML
0.25 INJECTION INTRAMUSCULAR ONCE AS NEEDED
Status: DISCONTINUED | OUTPATIENT
Start: 2020-01-10 | End: 2020-01-10 | Stop reason: HOSPADM

## 2020-01-10 RX ORDER — VANCOMYCIN HYDROCHLORIDE 1 G/20ML
INJECTION, POWDER, LYOPHILIZED, FOR SOLUTION INTRAVENOUS AS NEEDED
Status: DISCONTINUED | OUTPATIENT
Start: 2020-01-10 | End: 2020-01-10 | Stop reason: HOSPADM

## 2020-01-10 RX ORDER — METOCLOPRAMIDE HYDROCHLORIDE 5 MG/ML
5 INJECTION INTRAMUSCULAR; INTRAVENOUS EVERY 6 HOURS PRN
Status: ACTIVE | OUTPATIENT
Start: 2020-01-10 | End: 2020-01-12

## 2020-01-10 RX ORDER — BISACODYL 10 MG
10 SUPPOSITORY, RECTAL RECTAL
Status: DISCONTINUED | OUTPATIENT
Start: 2020-01-10 | End: 2020-01-15

## 2020-01-10 RX ORDER — SODIUM CHLORIDE 9 MG/ML
INJECTION, SOLUTION INTRAVENOUS CONTINUOUS
Status: DISCONTINUED | OUTPATIENT
Start: 2020-01-10 | End: 2020-01-13

## 2020-01-10 RX ORDER — HYDROMORPHONE HYDROCHLORIDE 1 MG/ML
0.2 INJECTION, SOLUTION INTRAMUSCULAR; INTRAVENOUS; SUBCUTANEOUS EVERY 5 MIN PRN
Status: DISCONTINUED | OUTPATIENT
Start: 2020-01-10 | End: 2020-01-10 | Stop reason: HOSPADM

## 2020-01-10 RX ORDER — SODIUM CHLORIDE, SODIUM LACTATE, POTASSIUM CHLORIDE, CALCIUM CHLORIDE 600; 310; 30; 20 MG/100ML; MG/100ML; MG/100ML; MG/100ML
INJECTION, SOLUTION INTRAVENOUS CONTINUOUS
Status: DISCONTINUED | OUTPATIENT
Start: 2020-01-10 | End: 2020-01-10 | Stop reason: HOSPADM

## 2020-01-10 RX ORDER — LIDOCAINE HYDROCHLORIDE 10 MG/ML
INJECTION, SOLUTION EPIDURAL; INFILTRATION; INTRACAUDAL; PERINEURAL AS NEEDED
Status: DISCONTINUED | OUTPATIENT
Start: 2020-01-10 | End: 2020-01-10 | Stop reason: SURG

## 2020-01-10 RX ORDER — FAMOTIDINE 10 MG/ML
20 INJECTION, SOLUTION INTRAVENOUS DAILY
Status: DISCONTINUED | OUTPATIENT
Start: 2020-01-10 | End: 2020-01-10

## 2020-01-10 RX ORDER — POLYETHYLENE GLYCOL 3350 17 G/17G
17 POWDER, FOR SOLUTION ORAL DAILY PRN
Status: DISCONTINUED | OUTPATIENT
Start: 2020-01-10 | End: 2020-01-14

## 2020-01-10 RX ORDER — MORPHINE SULFATE 4 MG/ML
4 INJECTION, SOLUTION INTRAMUSCULAR; INTRAVENOUS EVERY 2 HOUR PRN
Status: DISCONTINUED | OUTPATIENT
Start: 2020-01-10 | End: 2020-01-15

## 2020-01-10 RX ORDER — MIRTAZAPINE 15 MG/1
15 TABLET, FILM COATED ORAL NIGHTLY PRN
Status: DISCONTINUED | OUTPATIENT
Start: 2020-01-10 | End: 2020-02-04

## 2020-01-10 RX ORDER — PROCHLORPERAZINE EDISYLATE 5 MG/ML
5 INJECTION INTRAMUSCULAR; INTRAVENOUS ONCE AS NEEDED
Status: DISCONTINUED | OUTPATIENT
Start: 2020-01-10 | End: 2020-01-10 | Stop reason: HOSPADM

## 2020-01-10 RX ORDER — DIPHENHYDRAMINE HYDROCHLORIDE 50 MG/ML
12.5 INJECTION INTRAMUSCULAR; INTRAVENOUS EVERY 4 HOURS PRN
Status: DISCONTINUED | OUTPATIENT
Start: 2020-01-10 | End: 2020-01-14

## 2020-01-10 RX ORDER — DIPHENHYDRAMINE HYDROCHLORIDE 50 MG/ML
INJECTION INTRAMUSCULAR; INTRAVENOUS
Status: DISPENSED
Start: 2020-01-10 | End: 2020-01-11

## 2020-01-10 RX ORDER — MORPHINE SULFATE 1 MG/ML
INJECTION, SOLUTION EPIDURAL; INTRATHECAL; INTRAVENOUS AS NEEDED
Status: DISCONTINUED | OUTPATIENT
Start: 2020-01-10 | End: 2020-01-10 | Stop reason: SURG

## 2020-01-10 RX ORDER — HYDROCODONE BITARTRATE AND ACETAMINOPHEN 10; 325 MG/1; MG/1
2 TABLET ORAL EVERY 4 HOURS PRN
Status: DISCONTINUED | OUTPATIENT
Start: 2020-01-10 | End: 2020-01-10

## 2020-01-10 RX ORDER — MORPHINE SULFATE 2 MG/ML
2 INJECTION, SOLUTION INTRAMUSCULAR; INTRAVENOUS EVERY 2 HOUR PRN
Status: DISCONTINUED | OUTPATIENT
Start: 2020-01-10 | End: 2020-01-15

## 2020-01-10 RX ORDER — MORPHINE SULFATE 2 MG/ML
1 INJECTION, SOLUTION INTRAMUSCULAR; INTRAVENOUS EVERY 2 HOUR PRN
Status: DISCONTINUED | OUTPATIENT
Start: 2020-01-10 | End: 2020-01-15

## 2020-01-10 RX ORDER — VANCOMYCIN HYDROCHLORIDE
15 EVERY 24 HOURS
Status: DISCONTINUED | OUTPATIENT
Start: 2020-01-11 | End: 2020-01-12

## 2020-01-10 RX ORDER — DEXTROSE MONOHYDRATE 25 G/50ML
50 INJECTION, SOLUTION INTRAVENOUS AS NEEDED
Status: DISCONTINUED | OUTPATIENT
Start: 2020-01-10 | End: 2020-02-04

## 2020-01-10 RX ORDER — HYDROMORPHONE HYDROCHLORIDE 1 MG/ML
0.4 INJECTION, SOLUTION INTRAMUSCULAR; INTRAVENOUS; SUBCUTANEOUS EVERY 5 MIN PRN
Status: DISCONTINUED | OUTPATIENT
Start: 2020-01-10 | End: 2020-01-10 | Stop reason: HOSPADM

## 2020-01-10 RX ORDER — LEVOTHYROXINE SODIUM 0.05 MG/1
50 TABLET ORAL
Status: DISCONTINUED | OUTPATIENT
Start: 2020-01-11 | End: 2020-02-04

## 2020-01-10 RX ORDER — ATORVASTATIN CALCIUM 10 MG/1
10 TABLET, FILM COATED ORAL NIGHTLY
Status: DISCONTINUED | OUTPATIENT
Start: 2020-01-10 | End: 2020-02-04

## 2020-01-10 RX ORDER — MORPHINE SULFATE 4 MG/ML
2 INJECTION, SOLUTION INTRAMUSCULAR; INTRAVENOUS EVERY 10 MIN PRN
Status: DISCONTINUED | OUTPATIENT
Start: 2020-01-10 | End: 2020-01-10 | Stop reason: HOSPADM

## 2020-01-10 RX ORDER — HYDROCODONE BITARTRATE AND ACETAMINOPHEN 5; 325 MG/1; MG/1
1 TABLET ORAL AS NEEDED
Status: DISCONTINUED | OUTPATIENT
Start: 2020-01-10 | End: 2020-01-10 | Stop reason: HOSPADM

## 2020-01-10 RX ORDER — ACETAMINOPHEN 500 MG
1000 TABLET ORAL ONCE
Status: COMPLETED | OUTPATIENT
Start: 2020-01-10 | End: 2020-01-10

## 2020-01-10 RX ORDER — FAMOTIDINE 20 MG/1
20 TABLET ORAL 2 TIMES DAILY
Status: DISCONTINUED | OUTPATIENT
Start: 2020-01-10 | End: 2020-01-10

## 2020-01-10 RX ORDER — SODIUM PHOSPHATE, DIBASIC AND SODIUM PHOSPHATE, MONOBASIC 7; 19 G/133ML; G/133ML
1 ENEMA RECTAL ONCE AS NEEDED
Status: DISCONTINUED | OUTPATIENT
Start: 2020-01-10 | End: 2020-01-14

## 2020-01-10 RX ORDER — HYDROCODONE BITARTRATE AND ACETAMINOPHEN 10; 325 MG/1; MG/1
1 TABLET ORAL EVERY 4 HOURS PRN
Status: DISCONTINUED | OUTPATIENT
Start: 2020-01-10 | End: 2020-01-12

## 2020-01-10 RX ORDER — VANCOMYCIN HYDROCHLORIDE
15 ONCE
Status: COMPLETED | OUTPATIENT
Start: 2020-01-10 | End: 2020-01-10

## 2020-01-10 RX ORDER — ONDANSETRON 2 MG/ML
4 INJECTION INTRAMUSCULAR; INTRAVENOUS ONCE AS NEEDED
Status: DISCONTINUED | OUTPATIENT
Start: 2020-01-10 | End: 2020-01-10 | Stop reason: HOSPADM

## 2020-01-10 RX ADMIN — EPHEDRINE SULFATE 10 MG: 50 INJECTION, SOLUTION INTRAVENOUS at 11:53:00

## 2020-01-10 RX ADMIN — EPHEDRINE SULFATE 10 MG: 50 INJECTION, SOLUTION INTRAVENOUS at 11:57:00

## 2020-01-10 RX ADMIN — SODIUM CHLORIDE: 9 INJECTION, SOLUTION INTRAVENOUS at 14:24:00

## 2020-01-10 RX ADMIN — EPHEDRINE SULFATE 5 MG: 50 INJECTION, SOLUTION INTRAVENOUS at 11:56:00

## 2020-01-10 RX ADMIN — SODIUM CHLORIDE: 9 INJECTION, SOLUTION INTRAVENOUS at 12:18:00

## 2020-01-10 RX ADMIN — PHENYLEPHRINE HCL 50 MCG: 10 MG/ML VIAL (ML) INJECTION at 12:01:00

## 2020-01-10 RX ADMIN — DEXAMETHASONE SODIUM PHOSPHATE 4 MG: 4 MG/ML VIAL (ML) INJECTION at 12:02:00

## 2020-01-10 RX ADMIN — SODIUM CHLORIDE: 9 INJECTION, SOLUTION INTRAVENOUS at 14:43:00

## 2020-01-10 RX ADMIN — SODIUM CHLORIDE: 9 INJECTION, SOLUTION INTRAVENOUS at 11:41:00

## 2020-01-10 RX ADMIN — CEFAZOLIN SODIUM/WATER 2 G: 2 G/20 ML SYRINGE (ML) INTRAVENOUS at 11:57:00

## 2020-01-10 RX ADMIN — BUPIVACAINE HYDROCHLORIDE 1.6 ML: 7.5 INJECTION, SOLUTION INTRASPINAL at 11:36:00

## 2020-01-10 RX ADMIN — SODIUM CHLORIDE, SODIUM LACTATE, POTASSIUM CHLORIDE, CALCIUM CHLORIDE: 600; 310; 30; 20 INJECTION, SOLUTION INTRAVENOUS at 11:58:00

## 2020-01-10 RX ADMIN — GLYCOPYRROLATE 0.2 MG: 0.2 INJECTION, SOLUTION INTRAMUSCULAR; INTRAVENOUS at 11:31:00

## 2020-01-10 RX ADMIN — LIDOCAINE HYDROCHLORIDE 10 MG: 10 INJECTION, SOLUTION EPIDURAL; INFILTRATION; INTRACAUDAL; PERINEURAL at 11:33:00

## 2020-01-10 RX ADMIN — SODIUM CHLORIDE, SODIUM LACTATE, POTASSIUM CHLORIDE, CALCIUM CHLORIDE: 600; 310; 30; 20 INJECTION, SOLUTION INTRAVENOUS at 14:43:00

## 2020-01-10 RX ADMIN — LIDOCAINE HYDROCHLORIDE 50 MG: 10 INJECTION, SOLUTION EPIDURAL; INFILTRATION; INTRACAUDAL; PERINEURAL at 11:39:00

## 2020-01-10 RX ADMIN — ONDANSETRON 4 MG: 2 INJECTION INTRAMUSCULAR; INTRAVENOUS at 12:02:00

## 2020-01-10 RX ADMIN — MORPHINE SULFATE 0.3 MG: 1 INJECTION, SOLUTION EPIDURAL; INTRATHECAL; INTRAVENOUS at 11:36:00

## 2020-01-10 NOTE — ANESTHESIA PREPROCEDURE EVALUATION
Anesthesia PreOp Note    HPI:     Nina Whitehead is a 78year old male who presents for preoperative consultation requested by: Shady Irving MD    Date of Surgery: 1/10/2020    Procedure(s):  HIP TOTAL ARTHROPLASTY REVISION  Indication: infected lef metFORMIN HCl  MG Oral Tablet 24 Hr, Take 500 mg by mouth daily with breakfast., Disp: , Rfl: , 1/9/2020 at 2100  losartan 100 MG Oral Tab, Take by mouth daily. , Disp: , Rfl: , 1/9/2020 at 0800  mirtazapine 30 MG Oral Tab, Take 15 mg by mouth nightly Years of education: Not on file      Highest education level: Not on file    Occupational History      Not on file    Social Needs      Financial resource strain: Not on file      Food insecurity:        Worry: Not on file        Inability: Not on file Temp:  98.9 °F (37.2 °C)   TempSrc:  Oral   SpO2:  96%   Weight: 95.3 kg (210 lb) 93 kg (205 lb)   Height: 1.702 m (5' 7\") 1.676 m (5' 6\")        Anesthesia Evaluation     Patient summary reviewed and Nursing notes reviewed    History of anesthetic compl I have informed Jack Marker and/or legal guardian or family member of the nature of the anesthetic plan, benefits, risks including possible dental damage if relevant, major complications, and any alternative forms of anesthetic management.    All of t

## 2020-01-10 NOTE — OPERATIVE REPORT
[facility]    Revision DEVIN Brief Operative Note    Shemar Atkins Patient Status:  Surgery Admit - Inpt    1940 MRN [id number]   [place] [address] Attending Jennifer Rose MD     PCP No primary care provi

## 2020-01-10 NOTE — H&P
History & Physical Examination    Patient Name: Cholo Lee  MRN: [id number]  [facility]: [id number]  YOB: 1940    Diagnosis: Infected L DEVIN    Present Illness: Cholo Lee is a 78year old yo male with a history of L hip end stage DJD an 1/9/2020 at 0800  OxyCODONE HCl IR 10 MG Oral Tab, Take 10 mg by mouth every 6 (six) hours as needed for Pain., Disp: , Rfl:   Ondansetron HCl (ZOFRAN) 8 MG tablet, Take 8 mg by mouth every 8 (eight) hours as needed for Nausea., Disp: , Rfl: , More than a [ x ] [ ]    EXTREMITIES [ ] [ x ] LLE NVID, Skin intact, old scar healed   OTHER        [ x ] I have discussed the risks and benefits and alternatives with the patient/family. They understand and agree to proceed with plan of care.   [ x ] I have reviewed

## 2020-01-10 NOTE — ANESTHESIA POSTPROCEDURE EVALUATION
Patient: Jack Noonan    Procedure Summary     Date:  01/10/20 Room / Location:  [address] [place] [address] MAIN OR 06 / [address] [place] [address] MAIN OR    Anesthesia Start:  0990 Anesthesia Stop:      Procedure:  HIP TOTAL ARTHROPLASTY REVISION (Left Hip) Diagnosis:  (infected left total hip

## 2020-01-10 NOTE — OPERATIVE REPORT
[facility]    Revision DEVIN Operative Note    Marvene Lilian Patient Status:  Surgery Admit - Inpt    1940 MRN [id number]   [place] [address] Attending Dino Sky MD     PCP No primary care provider on damage, VTE, need for re-operation, dislocation, limb length discrepancy, loss of limb and loss of life.   Furthermore this is a revision procedure and the patient understands there are no guarantees and revision surgery is more complicated with a greater r the hip capsule to pathology for a frozen section. The hip was then dislocated with traction and flexion, adduction and internal rotation (this was done easily). Once this was done we found, that the femoral component was grossly loose.   A thorough synov elected to reimplant the cup. A 60mm TM mod cup was then opened and impacted into ~35-40 degrees of abduction and 30 degrees of anteversion. The cup gained good purchase and a trial liner was placed.       The femur was elevated from the wound and the ped A sterile dressing was placed and the drain was hooked up to self suction. The patient was aroused in the operating room and taken to the recovery room in stable condition. Of note all needle and sponge counts were correct in the operating room.   I was p

## 2020-01-10 NOTE — ANESTHESIA PROCEDURE NOTES
Spinal Block  Performed by: Sky Choi CRNA  Authorized by: Lori Saucedo MD       General Information and Staff    Start Time:  1/10/2020 11:33 AM  End Time:  1/10/2020 11:36 AM  Anesthesiologist:  Lori Saucedo MD  Performed by:   Anesth

## 2020-01-10 NOTE — ANESTHESIA PROCEDURE NOTES
Peripheral IV  Date/Time: 1/10/2020 11:41 AM  Inserted by: Alba Holman CRNA    Placement  Needle size: 18 G  Laterality: left  Location: forearm  Local anesthetic: none  Site prep: alcohol  Technique: anatomical landmarks  Attempts: 1

## 2020-01-11 LAB
ANION GAP SERPL CALC-SCNC: 4 MMOL/L (ref 0–18)
BUN BLD-MCNC: 31 MG/DL (ref 7–18)
BUN/CREAT SERPL: 19.4 (ref 10–20)
CALCIUM BLD-MCNC: 7.7 MG/DL (ref 8.5–10.1)
CHLORIDE SERPL-SCNC: 107 MMOL/L (ref 98–112)
CO2 SERPL-SCNC: 29 MMOL/L (ref 21–32)
CREAT BLD-MCNC: 1.6 MG/DL (ref 0.7–1.3)
DEPRECATED RDW RBC AUTO: 62 FL (ref 35.1–46.3)
ERYTHROCYTE [DISTWIDTH] IN BLOOD BY AUTOMATED COUNT: 18.5 % (ref 11–15)
EST. AVERAGE GLUCOSE BLD GHB EST-MCNC: 117 MG/DL (ref 68–126)
GLUCOSE BLD-MCNC: 116 MG/DL (ref 70–99)
GLUCOSE BLDC GLUCOMTR-MCNC: 121 MG/DL (ref 70–99)
GLUCOSE BLDC GLUCOMTR-MCNC: 133 MG/DL (ref 70–99)
GLUCOSE BLDC GLUCOMTR-MCNC: 138 MG/DL (ref 70–99)
GLUCOSE BLDC GLUCOMTR-MCNC: 150 MG/DL (ref 70–99)
HBA1C MFR BLD HPLC: 5.7 % (ref ?–5.7)
HCT VFR BLD AUTO: 28.5 % (ref 39–53)
HGB BLD-MCNC: 8.7 G/DL (ref 13–17.5)
MCH RBC QN AUTO: 28 PG (ref 26–34)
MCHC RBC AUTO-ENTMCNC: 30.5 G/DL (ref 31–37)
MCV RBC AUTO: 91.6 FL (ref 80–100)
OSMOLALITY SERPL CALC.SUM OF ELEC: 298 MOSM/KG (ref 275–295)
PLATELET # BLD AUTO: 186 10(3)UL (ref 150–450)
POTASSIUM SERPL-SCNC: 4 MMOL/L (ref 3.5–5.1)
RBC # BLD AUTO: 3.11 X10(6)UL (ref 3.8–5.8)
SODIUM SERPL-SCNC: 140 MMOL/L (ref 136–145)
WBC # BLD AUTO: 10.6 X10(3) UL (ref 4–11)

## 2020-01-11 PROCEDURE — 99233 SBSQ HOSP IP/OBS HIGH 50: CPT | Performed by: HOSPITALIST

## 2020-01-11 NOTE — RESPIRATORY THERAPY NOTE
ZEYAD ASSESSMENT:    Pt does have a previous diagnosis of ZEYAD. Pt does not routinely use a CPAP device at home.   CPAP INITIATION:    Pt to be placed on CPAP: no  Pt refused: yes

## 2020-01-11 NOTE — CM/SW NOTE
Received MDO for s/p total joint. SW spoke w/ pt in his room. Pt verified his address and confirmed living in an apartment w/ his wife. Pt informed SW that the building has an elevator. Pt was using a walker prior to his surgery.  Pt was also driving

## 2020-01-11 NOTE — PLAN OF CARE
Marita Montero is alert and oriented. Denies pain, still feeling relief from Duramorph. Abductor pillow in place. Mahan and hemovac patent. Urine output has been low tonight.  2 Boluses given to try to increase output, plan to keep mahan in until hospitalist examines t Assess pain using appropriate pain scale  - Administer analgesics based on type and severity of pain and evaluate response  - Implement non-pharmacological measures as appropriate and evaluate response  - Consider cultural and social influences on pain and

## 2020-01-11 NOTE — PHYSICAL THERAPY NOTE
Pt seen for first session in the early am.  Co-treated with OT. Please see pm note for details on both therapy visits for today.

## 2020-01-11 NOTE — PROGRESS NOTES
[facility] Pharmacy Note:  Renal Dose Adjustment    Melly Vizcaino has been prescribed famotidine (PEPCID) 20 mg intravenously every 12 hours. Estimated Creatinine Clearance: 32.3 mL/min (A) (based on SCr of 1.62 mg/dL (H)).     His calculated creatinine amy

## 2020-01-11 NOTE — CONSULTS
INFECTIOUS DISEASE CONSULT NOTE    Jairo Blend Patient Status:  Inpatient    1940 MRN [id number]   Location [facility] 4W/SW/SE Attending Luke Burton MD   Hosp Day # 1 PCP No (TRANSDERM-SCOP) patch, 1 patch, Transdermal, Once  •  lactated ringers infusion, , Intravenous, Continuous  •  Normal Saline Flush 0.9 % injection 10 mL, 10 mL, Intravenous, PRN  •  0.9% NaCl infusion, , Intravenous, Continuous  •  rivaroxaban (Savana Solre) t Intravenous, PRN  •  Glucose-Vitamin C (DEX-4) chewable tab 4 tablet, 4 tablet, Oral, Q15 Min PRN  •  glucose (DEX4) oral liquid 15 g, 15 g, Oral, Q15 Min PRN  •  Insulin Aspart Pen (NOVOLOG) 100 UNIT/ML flexpen 1-5 Units, 1-5 Units, Subcutaneous, TID CC 109 107   CO2 30.0 29.0       Microbiology    Reviewed in EMR    Radiology: Reviewed      Antibiotics:  vanco      ASSESSMENT:  1. 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

## 2020-01-11 NOTE — PLAN OF CARE
Patient up with PT today, 2 assist stand/pivot. Galarza catheter maintained and IVF maintained per Dr. Suyapa Weiner due to low urine output overnight. Patient can be forgetful at times, reoriented to place/situation. [address] for PRN pain control.  Diabetic accuche Glucose maintained within prescribed range  Description  INTERVENTIONS:  - Monitor Blood Glucose as ordered  - Assess for signs and symptoms of hyperglycemia and hypoglycemia  - Administer ordered medications to maintain glucose within target range  - Asse

## 2020-01-11 NOTE — OCCUPATIONAL THERAPY NOTE
OCCUPATIONAL THERAPY EVALUATION - INPATIENT      Room Number: 407/407-A  Evaluation Date: 1/11/2020  Type of Evaluation: Initial       Physician Order: IP Consult to Occupational Therapy  Reason for Therapy: ADL/IADL Dysfunction and Discharge Planning    O has been calculated based on documentation in the [facility] '6 clicks' Inpatient Daily Activity Short Form. Research supports that patients with this level of impairment may benefit from SHARLA.     DISCHARGE RECOMMENDATIONS  OT Discharge Recommendations: Sub-acu Restriction: L lower extremity           L Lower Extremity: Weight Bearing as Tolerated    PAIN ASSESSMENT  Ratin          ACTIVITY TOLERANCE  Pulse: 94        BP: 127/56  BP Location: Right arm  BP Method: Automatic  Patient Position: Sitting    O2 SA 1/20/20  Frequency:3x week    Kristan Leon, OTR/L   5 [facility]

## 2020-01-11 NOTE — PROGRESS NOTES
[facility]  Anesthesiology Epidural Follow-up Note  2020    Patient name: Barak Mello 78year old male  : 1940  MRN: [id number]    Diagnosis: [unfilled]    S/P: left hip removal with replacement    Pain treatment modality: Dur

## 2020-01-11 NOTE — PHYSICAL THERAPY NOTE
PHYSICAL THERAPY EVALUATION - INPATIENT     Room Number: 407/407-A  Evaluation Date: 1/11/2020  Type of Evaluation: Initial   Physician Order: PT Eval and Treat    Presenting Problem: Left total hip arthroplasty revision  Reason for Therapy: Mobility Dysf he required some encouragement and reassurance that he would not fall. He walks slightly forward flexed due to his history of back surgery. He will benefit from continued BID PT while at [address] [place] [address], and we anticipate greater gait distance tomorrow.   He will nee Yes  Patient Owned Equipment: Rolling walker(Reports having 3-4 walkers at home.)  Patient Regularly Uses: None    Prior Level of [place]: ambulated indep with walker, drives.     SUBJECTIVE  He stated that his pain was variable, but his pain medicatio CL    FUNCTIONAL ABILITY STATUS  Gait Assessment   Gait Assistance:  Moderate assistance(2 assist)  Distance (ft): 6-8 steps  Assistive Device: Rolling walker  Pattern: Shuffle;L Decreased stance time  Stoop/Curb Assistance: Not tested           Patient End

## 2020-01-12 ENCOUNTER — APPOINTMENT (OUTPATIENT)
Dept: ULTRASOUND IMAGING | Facility: HOSPITAL | Age: 80
DRG: 003 | End: 2020-01-12
Attending: HOSPITALIST
Payer: MEDICARE

## 2020-01-12 ENCOUNTER — APPOINTMENT (OUTPATIENT)
Dept: GENERAL RADIOLOGY | Facility: HOSPITAL | Age: 80
DRG: 003 | End: 2020-01-12
Attending: HOSPITALIST
Payer: MEDICARE

## 2020-01-12 LAB
ANION GAP SERPL CALC-SCNC: 3 MMOL/L (ref 0–18)
ANION GAP SERPL CALC-SCNC: 5 MMOL/L (ref 0–18)
APTT PPP: 53.3 SECONDS (ref 23.2–35.3)
APTT PPP: 78.7 SECONDS (ref 23.2–35.3)
BUN BLD-MCNC: 16 MG/DL (ref 7–18)
BUN BLD-MCNC: 19 MG/DL (ref 7–18)
BUN/CREAT SERPL: 15.2 (ref 10–20)
BUN/CREAT SERPL: 16 (ref 10–20)
CALCIUM BLD-MCNC: 7.5 MG/DL (ref 8.5–10.1)
CALCIUM BLD-MCNC: 8 MG/DL (ref 8.5–10.1)
CHLORIDE SERPL-SCNC: 107 MMOL/L (ref 98–112)
CHLORIDE SERPL-SCNC: 107 MMOL/L (ref 98–112)
CHOLEST SMN-MCNC: 99 MG/DL (ref ?–200)
CO2 SERPL-SCNC: 28 MMOL/L (ref 21–32)
CO2 SERPL-SCNC: 29 MMOL/L (ref 21–32)
CREAT BLD-MCNC: 1.05 MG/DL (ref 0.7–1.3)
CREAT BLD-MCNC: 1.19 MG/DL (ref 0.7–1.3)
DEPRECATED HBV CORE AB SER IA-ACNC: 56.5 NG/ML (ref 30–530)
DEPRECATED RDW RBC AUTO: 58.2 FL (ref 35.1–46.3)
DEPRECATED RDW RBC AUTO: 61.2 FL (ref 35.1–46.3)
ERYTHROCYTE [DISTWIDTH] IN BLOOD BY AUTOMATED COUNT: 17.9 % (ref 11–15)
ERYTHROCYTE [DISTWIDTH] IN BLOOD BY AUTOMATED COUNT: 18.3 % (ref 11–15)
GLUCOSE BLD-MCNC: 112 MG/DL (ref 70–99)
GLUCOSE BLD-MCNC: 115 MG/DL (ref 70–99)
GLUCOSE BLDC GLUCOMTR-MCNC: 117 MG/DL (ref 70–99)
GLUCOSE BLDC GLUCOMTR-MCNC: 123 MG/DL (ref 70–99)
GLUCOSE BLDC GLUCOMTR-MCNC: 133 MG/DL (ref 70–99)
HAV IGM SER QL: 2.1 MG/DL (ref 1.6–2.6)
HCT VFR BLD AUTO: 25 % (ref 39–53)
HCT VFR BLD AUTO: 25.5 % (ref 39–53)
HCT VFR BLD AUTO: 26.2 % (ref 39–53)
HDLC SERPL-MCNC: 20 MG/DL (ref 40–59)
HGB BLD-MCNC: 7.7 G/DL (ref 13–17.5)
HGB BLD-MCNC: 8 G/DL (ref 13–17.5)
HGB BLD-MCNC: 8.2 G/DL (ref 13–17.5)
HGB BLD-MCNC: 8.4 G/DL (ref 13–17.5)
IRON SATURATION: 3 % (ref 20–50)
IRON SERPL-MCNC: 10 UG/DL (ref 65–175)
LDLC SERPL CALC-MCNC: 54 MG/DL (ref ?–100)
MCH RBC QN AUTO: 28 PG (ref 26–34)
MCH RBC QN AUTO: 28.1 PG (ref 26–34)
MCHC RBC AUTO-ENTMCNC: 30.8 G/DL (ref 31–37)
MCHC RBC AUTO-ENTMCNC: 31.3 G/DL (ref 31–37)
MCV RBC AUTO: 89.4 FL (ref 80–100)
MCV RBC AUTO: 91.2 FL (ref 80–100)
NONHDLC SERPL-MCNC: 79 MG/DL (ref ?–130)
OSMOLALITY SERPL CALC.SUM OF ELEC: 291 MOSM/KG (ref 275–295)
OSMOLALITY SERPL CALC.SUM OF ELEC: 292 MOSM/KG (ref 275–295)
PHOSPHATE SERPL-MCNC: 2.1 MG/DL (ref 2.5–4.9)
PLATELET # BLD AUTO: 150 10(3)UL (ref 150–450)
PLATELET # BLD AUTO: 162 10(3)UL (ref 150–450)
POTASSIUM SERPL-SCNC: 3.7 MMOL/L (ref 3.5–5.1)
POTASSIUM SERPL-SCNC: 3.7 MMOL/L (ref 3.5–5.1)
RBC # BLD AUTO: 2.74 X10(6)UL (ref 3.8–5.8)
RBC # BLD AUTO: 2.93 X10(6)UL (ref 3.8–5.8)
SODIUM SERPL-SCNC: 139 MMOL/L (ref 136–145)
SODIUM SERPL-SCNC: 140 MMOL/L (ref 136–145)
TOTAL IRON BINDING CAPACITY: 325 UG/DL (ref 240–450)
TRANSFERRIN SERPL-MCNC: 218 MG/DL (ref 200–360)
TRIGL SERPL-MCNC: 123 MG/DL (ref 30–149)
TROPONIN I SERPL-MCNC: 3.15 NG/ML (ref ?–0.04)
TROPONIN I SERPL-MCNC: 3.88 NG/ML (ref ?–0.04)
TROPONIN I SERPL-MCNC: 3.93 NG/ML (ref ?–0.04)
VANCOMYCIN TROUGH SERPL-MCNC: 7.4 UG/ML (ref 10–20)
VLDLC SERPL CALC-MCNC: 25 MG/DL (ref 0–30)
WBC # BLD AUTO: 9.2 X10(3) UL (ref 4–11)
WBC # BLD AUTO: 9.3 X10(3) UL (ref 4–11)

## 2020-01-12 PROCEDURE — 71046 X-RAY EXAM CHEST 2 VIEWS: CPT | Performed by: HOSPITALIST

## 2020-01-12 PROCEDURE — 99357 PROLONGED SERV,INPATIENT,EA ADD 1/2: CPT | Performed by: HOSPITALIST

## 2020-01-12 PROCEDURE — 99356 PROLONGED SERV,INPATIENT,1ST HR: CPT | Performed by: HOSPITALIST

## 2020-01-12 PROCEDURE — 99233 SBSQ HOSP IP/OBS HIGH 50: CPT | Performed by: HOSPITALIST

## 2020-01-12 RX ORDER — ACETAMINOPHEN 500 MG
500 TABLET ORAL EVERY 6 HOURS PRN
Status: DISCONTINUED | OUTPATIENT
Start: 2020-01-12 | End: 2020-01-14

## 2020-01-12 RX ORDER — HEPARIN SODIUM 1000 [USP'U]/ML
5000 INJECTION, SOLUTION INTRAVENOUS; SUBCUTANEOUS ONCE
Status: COMPLETED | OUTPATIENT
Start: 2020-01-12 | End: 2020-01-12

## 2020-01-12 RX ORDER — FUROSEMIDE 10 MG/ML
20 INJECTION INTRAMUSCULAR; INTRAVENOUS ONCE
Status: DISCONTINUED | OUTPATIENT
Start: 2020-01-12 | End: 2020-01-15

## 2020-01-12 RX ORDER — NITROGLYCERIN 20 MG/100ML
INJECTION INTRAVENOUS
Status: DISPENSED
Start: 2020-01-12 | End: 2020-01-13

## 2020-01-12 RX ORDER — NITROGLYCERIN 20 MG/100ML
INJECTION INTRAVENOUS CONTINUOUS
Status: DISCONTINUED | OUTPATIENT
Start: 2020-01-12 | End: 2020-01-19

## 2020-01-12 RX ORDER — HYDROCODONE BITARTRATE AND ACETAMINOPHEN 5; 325 MG/1; MG/1
2 TABLET ORAL EVERY 4 HOURS PRN
Status: DISCONTINUED | OUTPATIENT
Start: 2020-01-12 | End: 2020-01-14

## 2020-01-12 RX ORDER — ACETAMINOPHEN 325 MG/1
650 TABLET ORAL ONCE AS NEEDED
Status: DISCONTINUED | OUTPATIENT
Start: 2020-01-12 | End: 2020-01-12

## 2020-01-12 RX ORDER — HEPARIN SODIUM 5000 [USP'U]/ML
INJECTION, SOLUTION INTRAVENOUS; SUBCUTANEOUS
Status: DISPENSED
Start: 2020-01-12 | End: 2020-01-13

## 2020-01-12 RX ORDER — HEPARIN SODIUM AND DEXTROSE 10000; 5 [USP'U]/100ML; G/100ML
INJECTION INTRAVENOUS CONTINUOUS
Status: DISCONTINUED | OUTPATIENT
Start: 2020-01-12 | End: 2020-01-14 | Stop reason: ALTCHOICE

## 2020-01-12 RX ORDER — HEPARIN SODIUM AND DEXTROSE 10000; 5 [USP'U]/100ML; G/100ML
1000 INJECTION INTRAVENOUS ONCE
Status: COMPLETED | OUTPATIENT
Start: 2020-01-12 | End: 2020-01-12

## 2020-01-12 RX ORDER — ASPIRIN 325 MG
325 TABLET, DELAYED RELEASE (ENTERIC COATED) ORAL DAILY
Status: DISCONTINUED | OUTPATIENT
Start: 2020-01-13 | End: 2020-01-13

## 2020-01-12 RX ORDER — ASPIRIN 81 MG/1
243 TABLET, CHEWABLE ORAL ONCE
Status: COMPLETED | OUTPATIENT
Start: 2020-01-12 | End: 2020-01-12

## 2020-01-12 RX ORDER — DULOXETIN HYDROCHLORIDE 30 MG/1
30 CAPSULE, DELAYED RELEASE ORAL DAILY
Status: DISCONTINUED | OUTPATIENT
Start: 2020-01-12 | End: 2020-02-04

## 2020-01-12 RX ORDER — VANCOMYCIN HYDROCHLORIDE
1500 EVERY 24 HOURS
Status: DISCONTINUED | OUTPATIENT
Start: 2020-01-12 | End: 2020-01-13 | Stop reason: ALTCHOICE

## 2020-01-12 RX ORDER — SODIUM CHLORIDE 9 MG/ML
INJECTION, SOLUTION INTRAVENOUS
Status: ACTIVE | OUTPATIENT
Start: 2020-01-13 | End: 2020-01-13

## 2020-01-12 RX ORDER — LOSARTAN POTASSIUM 100 MG/1
100 TABLET ORAL DAILY
Status: DISCONTINUED | OUTPATIENT
Start: 2020-01-12 | End: 2020-01-17

## 2020-01-12 RX ORDER — CARVEDILOL 12.5 MG/1
12.5 TABLET ORAL 2 TIMES DAILY WITH MEALS
Status: DISCONTINUED | OUTPATIENT
Start: 2020-01-12 | End: 2020-01-12

## 2020-01-12 RX ORDER — ACETAMINOPHEN 325 MG/1
650 TABLET ORAL ONCE
Status: DISCONTINUED | OUTPATIENT
Start: 2020-01-12 | End: 2020-01-23

## 2020-01-12 RX ORDER — CARVEDILOL 25 MG/1
25 TABLET ORAL 2 TIMES DAILY WITH MEALS
Status: DISCONTINUED | OUTPATIENT
Start: 2020-01-12 | End: 2020-01-26

## 2020-01-12 RX ORDER — VITS A,C,E/LUTEIN/MINERALS 300MCG-200
1 TABLET ORAL DAILY
Status: DISCONTINUED | OUTPATIENT
Start: 2020-01-12 | End: 2020-02-04

## 2020-01-12 RX ORDER — CHLORHEXIDINE GLUCONATE 4 G/100ML
30 SOLUTION TOPICAL
Status: COMPLETED | OUTPATIENT
Start: 2020-01-13 | End: 2020-01-13

## 2020-01-12 RX ORDER — SODIUM CHLORIDE 9 MG/ML
INJECTION, SOLUTION INTRAVENOUS ONCE
Status: DISCONTINUED | OUTPATIENT
Start: 2020-01-12 | End: 2020-01-12

## 2020-01-12 RX ORDER — NITROGLYCERIN 0.4 MG/1
TABLET SUBLINGUAL
Status: COMPLETED
Start: 2020-01-12 | End: 2020-01-12

## 2020-01-12 NOTE — PHYSICAL THERAPY NOTE
PHYSICAL THERAPY HIP TREATMENT NOTE - INPATIENT    Room Number: 407/407-A            Presenting Problem: Left total hip arthroplasty revision    Problem List  Active Problems:    History of revision of total replacement of hip joint    Infection of prosthe over in bed (including adjusting bedclothes, sheets and blankets)?: A Lot   -   Sitting down on and standing up from a chair with arms (e.g., wheelchair, bedside commode, etc.): A Lot   -   Moving from lying on back to sitting on the side of the bed?: A Lo

## 2020-01-12 NOTE — CONSULTS
[facility]OS[facility] - [facility]    Cardiology Consultation    Barak Mello Location: [facility] 4W/SW/SE    1940 MRN [id number]   Consulting Date 2020 [facility] [id number]   Consulting Physician Niru Murray MD Attending Physician STEPHANY Boateng MG Oral Tab, Take 40 mg by mouth nightly., Disp: , Rfl:   tamsulosin HCl 0.4 MG Oral Cap, Take 0.8 mg by mouth daily. , Disp: , Rfl:   carvedilol 12.5 MG Oral Tab, Take 12.5 mg by mouth 2 (two) times daily with meals.   , Disp: , Rfl:   aspirin 81 MG Oral Ta HEENT: Atraumatic. No scleral icterus. Mucous membranes are moist.  Neck:  Supple, no LAD  Lungs: Clear to auscultation bilaterally.   Cardiac: JVP nl, RRR,nl S1/S2, no edema  Abdomen: Soft, nontender, nondistended  Extremities:  No significant arthriti reviewed. - Recommend transfusing RBCs to maintain Hgb > 8 as long as he is asymptomatic. Any symptoms, and I rec transfusing to keep Hgb > 10.   - Increase coreg to 25 BID  - give additional asa 81 mg x 4, then increase ASA to 325 daily  - transfer to PCU

## 2020-01-12 NOTE — PLAN OF CARE
Patient transferred to PCU in late afternoon due to possible acute coronary event. In AM, patient refused EKG, MD notified, later patient reported anxiety/hallucinations, MD notified.  Family arrived late morning with additional history related to H/P. MD grant values, beliefs, and cultural backgrounds into the planning and delivery of care  - Encourage patient/family to participate in care and decision-making at the level they choose  - Honor patient and family perspectives and choices   Outcome: Progressing assistive devices as appropriate  - Consider OT/PT consult to assist with strengthening/mobility  - Encourage toileting schedule  Outcome: Progressing

## 2020-01-12 NOTE — PROGRESS NOTES
[facility] dosing service    Follow-up Pharmacokinetic Consult for Vancomycin Dosing     Franky Villagran is a 78year old male who is being treated for PJI  s/p revision L-DEVIN pod#2.    Patient is on day 2 of Vancomycin and is currently receiving 1.25 g adjusted body weight of 75.5 kg and renal function)    2. Will re-check Vancomycin trough levels prior to 4th dose (next trough level will need to be ordered if continued while inpatient). Goal trough level 15-20 ug/mL.     3.  Pharmacy will need Scr annie

## 2020-01-12 NOTE — PLAN OF CARE
Problem: Patient/Family Goals  Goal: Patient/Family Long Term Goal  Description  Patient's Long Term Goal: \"To be able to get back home and walking\"    Interventions:  - Patient will need to discharge to rehab when cleared for further strength and mobi comfort level or patient's stated pain goal  Description  INTERVENTIONS:  - Encourage pt to monitor pain and request assistance  - Assess pain using appropriate pain scale  - Administer analgesics based on type and severity of pain and evaluate response  -

## 2020-01-12 NOTE — PROGRESS NOTES
[place] [facility]    Progress Note    Judith Sosa Patient Status:  Inpatient    1940 MRN [id number]   Location [facility] 4W/SW/SE Attending Concetta Boateng Day # 1 PCP No primary care provider on file. Results:     Lab Results   Component Value Date    WBC 10.6 01/11/2020    HGB 8.7 (L) 01/11/2020    HCT 28.5 (L) 01/11/2020    .0 01/11/2020    CREATSERUM 1.60 (H) 01/11/2020    BUN 31 (H) 01/11/2020     01/11/2020    K 4.0 01/11/2020    CL

## 2020-01-12 NOTE — PROGRESS NOTES
[place] [facility] - [facility]    Progress Note    Ellie Dsouza Patient Status:  Inpatient    1940 MRN [id number]   Location [facility] 4W/SW/SE Attending Concetta Boateng Day # 2 PCP No primary care provider on file.        Wayne Mascorro 01/12/2020     (H) 01/12/2020    CA 8.0 (L) 01/12/2020    PTT 53.3 (H) 01/12/2020    MG 2.1 01/12/2020    PHOS 2.1 (L) 01/12/2020    TROP 3.880 (HH) 01/12/2020           Ekg 12-lead    Result Date: 1/12/2020  ECG Report  Interpretation  ------------

## 2020-01-12 NOTE — PROGRESS NOTES
[facility]    Cardiology - AM-S  Progress Note    Frederic Quigley Patient Status:  Inpatient    1940 MRN [id number]   Location [facility] 4W/SW/SE Attending Concetta Boateng Day # 2 PCP No primary care pro

## 2020-01-12 NOTE — PROGRESS NOTES
[place] [facility] - [facility]    Progress Note    Marvene Lilian Patient Status:  Inpatient    1940 MRN [id number]   Location [facility] 4W/SW/SE Attending Concetta Boateng Day # 2 PCP No primary care provider on file. anticoag needed with fresh hip  3. Poor urine output bladder scan neg will bolus no chf hx per pt; creat better  4. Reflux hx and chest pain at night never with exertion and some now with inc vijaya  Pt declined ekg at first no accepted no ischemia  5.  Acut

## 2020-01-13 ENCOUNTER — ANESTHESIA EVENT (OUTPATIENT)
Dept: SURGERY | Facility: HOSPITAL | Age: 80
DRG: 003 | End: 2020-01-13
Payer: MEDICARE

## 2020-01-13 ENCOUNTER — APPOINTMENT (OUTPATIENT)
Dept: INTERVENTIONAL RADIOLOGY/VASCULAR | Facility: HOSPITAL | Age: 80
DRG: 003 | End: 2020-01-13
Attending: INTERNAL MEDICINE
Payer: MEDICARE

## 2020-01-13 ENCOUNTER — APPOINTMENT (OUTPATIENT)
Dept: GENERAL RADIOLOGY | Facility: HOSPITAL | Age: 80
DRG: 003 | End: 2020-01-13
Attending: INTERNAL MEDICINE
Payer: MEDICARE

## 2020-01-13 ENCOUNTER — APPOINTMENT (OUTPATIENT)
Dept: CV DIAGNOSTICS | Facility: HOSPITAL | Age: 80
DRG: 003 | End: 2020-01-13
Attending: HOSPITALIST
Payer: MEDICARE

## 2020-01-13 ENCOUNTER — APPOINTMENT (OUTPATIENT)
Dept: ULTRASOUND IMAGING | Facility: HOSPITAL | Age: 80
DRG: 003 | End: 2020-01-13
Attending: HOSPITALIST
Payer: MEDICARE

## 2020-01-13 ENCOUNTER — APPOINTMENT (OUTPATIENT)
Dept: ULTRASOUND IMAGING | Facility: HOSPITAL | Age: 80
DRG: 003 | End: 2020-01-13
Attending: CLINICAL NURSE SPECIALIST
Payer: MEDICARE

## 2020-01-13 ENCOUNTER — APPOINTMENT (OUTPATIENT)
Dept: NUCLEAR MEDICINE | Facility: HOSPITAL | Age: 80
DRG: 003 | End: 2020-01-13
Attending: HOSPITALIST
Payer: MEDICARE

## 2020-01-13 ENCOUNTER — TELEPHONE (OUTPATIENT)
Dept: CARDIOLOGY | Age: 80
End: 2020-01-13

## 2020-01-13 LAB
ALBUMIN SERPL-MCNC: 2.1 G/DL (ref 3.4–5)
ALP LIVER SERPL-CCNC: 71 U/L (ref 45–117)
ALT SERPL-CCNC: 8 U/L (ref 16–61)
ANION GAP SERPL CALC-SCNC: 3 MMOL/L (ref 0–18)
ANION GAP SERPL CALC-SCNC: 6 MMOL/L (ref 0–18)
ANTIBODY SCREEN: NEGATIVE
APTT PPP: 66.4 SECONDS (ref 23.2–35.3)
AST SERPL-CCNC: 52 U/L (ref 15–37)
BACTERIA UR QL AUTO: NEGATIVE /HPF
BILIRUB DIRECT SERPL-MCNC: 0.1 MG/DL (ref 0–0.2)
BILIRUB SERPL-MCNC: 0.5 MG/DL (ref 0.1–2)
BILIRUB UR QL: NEGATIVE
BUN BLD-MCNC: 15 MG/DL (ref 7–18)
BUN BLD-MCNC: 16 MG/DL (ref 7–18)
BUN/CREAT SERPL: 13.9 (ref 10–20)
BUN/CREAT SERPL: 13.9 (ref 10–20)
CALCIUM BLD-MCNC: 7.9 MG/DL (ref 8.5–10.1)
CALCIUM BLD-MCNC: 8.2 MG/DL (ref 8.5–10.1)
CHLORIDE SERPL-SCNC: 105 MMOL/L (ref 98–112)
CHLORIDE SERPL-SCNC: 106 MMOL/L (ref 98–112)
CO2 SERPL-SCNC: 29 MMOL/L (ref 21–32)
CO2 SERPL-SCNC: 31 MMOL/L (ref 21–32)
COLOR UR: YELLOW
CREAT BLD-MCNC: 1.08 MG/DL (ref 0.7–1.3)
CREAT BLD-MCNC: 1.15 MG/DL (ref 0.7–1.3)
DEPRECATED RDW RBC AUTO: 58.6 FL (ref 35.1–46.3)
ERYTHROCYTE [DISTWIDTH] IN BLOOD BY AUTOMATED COUNT: 17.8 % (ref 11–15)
GLUCOSE BLD-MCNC: 114 MG/DL (ref 70–99)
GLUCOSE BLD-MCNC: 117 MG/DL (ref 70–99)
GLUCOSE BLDC GLUCOMTR-MCNC: 118 MG/DL (ref 70–99)
GLUCOSE BLDC GLUCOMTR-MCNC: 120 MG/DL (ref 70–99)
GLUCOSE BLDC GLUCOMTR-MCNC: 127 MG/DL (ref 70–99)
GLUCOSE BLDC GLUCOMTR-MCNC: 131 MG/DL (ref 70–99)
GLUCOSE UR-MCNC: NEGATIVE MG/DL
HAV IGM SER QL: 2.2 MG/DL (ref 1.6–2.6)
HCT VFR BLD AUTO: 22.7 % (ref 39–53)
HCT VFR BLD AUTO: 29.3 % (ref 39–53)
HGB BLD-MCNC: 7.1 G/DL (ref 13–17.5)
HGB BLD-MCNC: 9.4 G/DL (ref 13–17.5)
HYALINE CASTS #/AREA URNS AUTO: 3 /LPF
INR BLD: 1.59 (ref 0.9–1.2)
KETONES UR-MCNC: NEGATIVE MG/DL
LEUKOCYTE ESTERASE UR QL STRIP.AUTO: NEGATIVE
M PROTEIN MFR SERPL ELPH: 5.1 G/DL (ref 6.4–8.2)
MCH RBC QN AUTO: 28 PG (ref 26–34)
MCHC RBC AUTO-ENTMCNC: 31.3 G/DL (ref 31–37)
MCV RBC AUTO: 89.4 FL (ref 80–100)
MRSA DNA SPEC QL NAA+PROBE: NEGATIVE
NITRITE UR QL STRIP.AUTO: NEGATIVE
OSMOLALITY SERPL CALC.SUM OF ELEC: 290 MOSM/KG (ref 275–295)
OSMOLALITY SERPL CALC.SUM OF ELEC: 294 MOSM/KG (ref 275–295)
PH UR: 5 [PH] (ref 5–8)
PLATELET # BLD AUTO: 152 10(3)UL (ref 150–450)
POTASSIUM SERPL-SCNC: 3.7 MMOL/L (ref 3.5–5.1)
POTASSIUM SERPL-SCNC: 3.9 MMOL/L (ref 3.5–5.1)
PROT UR-MCNC: NEGATIVE MG/DL
PROTHROMBIN TIME: 19 SECONDS (ref 11.8–14.5)
RBC # BLD AUTO: 2.54 X10(6)UL (ref 3.8–5.8)
RBC #/AREA URNS AUTO: 5 /HPF
RH BLOOD TYPE: POSITIVE
SODIUM SERPL-SCNC: 139 MMOL/L (ref 136–145)
SODIUM SERPL-SCNC: 141 MMOL/L (ref 136–145)
SP GR UR STRIP: 1.02 (ref 1–1.03)
TROPONIN I SERPL-MCNC: 8.75 NG/ML (ref ?–0.04)
UROBILINOGEN UR STRIP-ACNC: <2
WBC # BLD AUTO: 7.9 X10(3) UL (ref 4–11)
WBC #/AREA URNS AUTO: 1 /HPF

## 2020-01-13 PROCEDURE — 99233 SBSQ HOSP IP/OBS HIGH 50: CPT | Performed by: NURSE PRACTITIONER

## 2020-01-13 PROCEDURE — 93306 TTE W/DOPPLER COMPLETE: CPT | Performed by: HOSPITALIST

## 2020-01-13 PROCEDURE — 99223 1ST HOSP IP/OBS HIGH 75: CPT | Performed by: INTERNAL MEDICINE

## 2020-01-13 PROCEDURE — 30233N1 TRANSFUSION OF NONAUTOLOGOUS RED BLOOD CELLS INTO PERIPHERAL VEIN, PERCUTANEOUS APPROACH: ICD-10-PCS | Performed by: INTERNAL MEDICINE

## 2020-01-13 PROCEDURE — 78582 LUNG VENTILAT&PERFUS IMAGING: CPT | Performed by: HOSPITALIST

## 2020-01-13 PROCEDURE — 99356 PROLONGED SERV,INPATIENT,1ST HR: CPT | Performed by: NURSE PRACTITIONER

## 2020-01-13 PROCEDURE — 30233H1 TRANSFUSION OF NONAUTOLOGOUS WHOLE BLOOD INTO PERIPHERAL VEIN, PERCUTANEOUS APPROACH: ICD-10-PCS | Performed by: INTERNAL MEDICINE

## 2020-01-13 PROCEDURE — 5A02210 ASSISTANCE WITH CARDIAC OUTPUT USING BALLOON PUMP, CONTINUOUS: ICD-10-PCS | Performed by: INTERNAL MEDICINE

## 2020-01-13 PROCEDURE — 93880 EXTRACRANIAL BILAT STUDY: CPT | Performed by: CLINICAL NURSE SPECIALIST

## 2020-01-13 PROCEDURE — B2111ZZ FLUOROSCOPY OF MULTIPLE CORONARY ARTERIES USING LOW OSMOLAR CONTRAST: ICD-10-PCS | Performed by: INTERNAL MEDICINE

## 2020-01-13 PROCEDURE — B2151ZZ FLUOROSCOPY OF LEFT HEART USING LOW OSMOLAR CONTRAST: ICD-10-PCS | Performed by: INTERNAL MEDICINE

## 2020-01-13 PROCEDURE — 93970 EXTREMITY STUDY: CPT | Performed by: HOSPITALIST

## 2020-01-13 PROCEDURE — 71045 X-RAY EXAM CHEST 1 VIEW: CPT | Performed by: INTERNAL MEDICINE

## 2020-01-13 PROCEDURE — 4A023N7 MEASUREMENT OF CARDIAC SAMPLING AND PRESSURE, LEFT HEART, PERCUTANEOUS APPROACH: ICD-10-PCS | Performed by: INTERNAL MEDICINE

## 2020-01-13 RX ORDER — MAGNESIUM OXIDE 400 MG (241.3 MG MAGNESIUM) TABLET
3 TABLET NIGHTLY PRN
Status: DISCONTINUED | OUTPATIENT
Start: 2020-01-13 | End: 2020-01-14

## 2020-01-13 RX ORDER — ACETAMINOPHEN 325 MG/1
650 TABLET ORAL ONCE
Status: COMPLETED | OUTPATIENT
Start: 2020-01-13 | End: 2020-01-13

## 2020-01-13 RX ORDER — LORAZEPAM 0.5 MG/1
0.5 TABLET ORAL ONCE
Status: DISCONTINUED | OUTPATIENT
Start: 2020-01-13 | End: 2020-01-13

## 2020-01-13 RX ORDER — SODIUM CHLORIDE 9 MG/ML
83 INJECTION, SOLUTION INTRAVENOUS CONTINUOUS
Status: DISCONTINUED | OUTPATIENT
Start: 2020-01-14 | End: 2020-01-15 | Stop reason: ALTCHOICE

## 2020-01-13 RX ORDER — GABAPENTIN 600 MG/1
600 TABLET ORAL ONCE
Status: DISCONTINUED | OUTPATIENT
Start: 2020-01-13 | End: 2020-01-15

## 2020-01-13 RX ORDER — ASCORBIC ACID 500 MG
1000 TABLET ORAL ONCE
Status: COMPLETED | OUTPATIENT
Start: 2020-01-13 | End: 2020-01-13

## 2020-01-13 RX ORDER — FUROSEMIDE 10 MG/ML
20 INJECTION INTRAMUSCULAR; INTRAVENOUS ONCE
Status: COMPLETED | OUTPATIENT
Start: 2020-01-13 | End: 2020-01-13

## 2020-01-13 RX ORDER — ASPIRIN 81 MG/1
81 TABLET ORAL DAILY
Status: DISCONTINUED | OUTPATIENT
Start: 2020-01-14 | End: 2020-01-14

## 2020-01-13 RX ORDER — VANCOMYCIN HYDROCHLORIDE
15 EVERY 24 HOURS
Status: DISCONTINUED | OUTPATIENT
Start: 2020-01-14 | End: 2020-01-17

## 2020-01-13 RX ORDER — CEFAZOLIN SODIUM/WATER 2 G/20 ML
2 SYRINGE (ML) INTRAVENOUS
Status: COMPLETED | OUTPATIENT
Start: 2020-01-14 | End: 2020-01-14

## 2020-01-13 RX ORDER — LIDOCAINE HYDROCHLORIDE 20 MG/ML
INJECTION, SOLUTION EPIDURAL; INFILTRATION; INTRACAUDAL; PERINEURAL
Status: COMPLETED
Start: 2020-01-13 | End: 2020-01-13

## 2020-01-13 RX ORDER — FAMOTIDINE 20 MG/1
20 TABLET ORAL ONCE
Status: COMPLETED | OUTPATIENT
Start: 2020-01-14 | End: 2020-01-14

## 2020-01-13 RX ORDER — POTASSIUM CHLORIDE 20 MEQ/1
40 TABLET, EXTENDED RELEASE ORAL ONCE
Status: COMPLETED | OUTPATIENT
Start: 2020-01-13 | End: 2020-01-13

## 2020-01-13 RX ORDER — SODIUM CHLORIDE 9 MG/ML
INJECTION, SOLUTION INTRAVENOUS ONCE
Status: DISCONTINUED | OUTPATIENT
Start: 2020-01-13 | End: 2020-01-15 | Stop reason: ALTCHOICE

## 2020-01-13 RX ORDER — METOCLOPRAMIDE 10 MG/1
10 TABLET ORAL ONCE
Status: COMPLETED | OUTPATIENT
Start: 2020-01-14 | End: 2020-01-14

## 2020-01-13 RX ORDER — MIDAZOLAM HYDROCHLORIDE 1 MG/ML
INJECTION INTRAMUSCULAR; INTRAVENOUS
Status: COMPLETED
Start: 2020-01-13 | End: 2020-01-13

## 2020-01-13 NOTE — PHYSICAL THERAPY NOTE
Pt was to be seen for PT treatment session. Pt had been transferred to PCU in PM of 1/12 due to possible acute coronary event. Consulted w/ RN. Per RN, pt is on bedrest s/p cath & intra aortic balloon pump placement today.  Per RN, pt will have open hea

## 2020-01-13 NOTE — PROGRESS NOTES
- Pt arrived from floor d/t chest pain/MI. Family accompanied pt. - Pt lost an IV en route, thus 1 IV remained in place. Attempted without success x 3 to obtain a new one.   Thus, had another RN trained with vein ultrasound to attempt to insert IV; she

## 2020-01-13 NOTE — CONSULTS
[facility]    Report of Consultation    Isidro Lara Patient Status:  Inpatient    1940 MRN [id number]   Location [facility] 2W/SW Attending Concetta Boateng Day # 3 PCP No primary care provider on file. 2018    subdural hematoma   • HIP TOTAL ARTHROPLASTY REVISION Left 1/10/2020    Performed by Raya Sullivan MD at [address] [facility][note 3] MAIN OR   • SPINE SURGERY PROCEDURE UNLISTED     • TOTAL HIP REPLACEMENT Left    • TOTAL KNEE REPLACEMENT Bilateral        Family Histor injection 20 mg, 20 mg, Intravenous, Once  aspirin EC EC tab 325 mg, 325 mg, Oral, Daily  carvedilol (COREG) tab 25 mg, 25 mg, Oral, BID with meals  nitroGLYCERIN infusion 50mg in D5W 250ml, 5-400 mcg/min, Intravenous, Continuous  Normal Saline Flush 0.9 % MG Oral Tab, Take 40 mg by mouth nightly. tamsulosin HCl 0.4 MG Oral Cap, Take 0.8 mg by mouth daily. carvedilol 12.5 MG Oral Tab, Take 12.5 mg by mouth 2 (two) times daily with meals. aspirin 81 MG Oral Tab, Take 81 mg by mouth daily.   hydrochlorothi source Temporal, resp. rate 16, height 66\", weight 201 lb 1.6 oz (91.2 kg), SpO2 100 %. Intake/Output:   Last 3 shifts: I/O last 3 completed shifts: In: 1116 [P.O.:900;  I.V.:216]  Out: 3625 [Urine:3450; Drains:175]   This shift: No intake/output data re balloon pump is in place in the right groin with a good distal pulse  Abdomen is soft nontender with no organomegaly mass or appreciable aneurysm. Extremities are without cyanosis clubbing or edema.   There is some swelling around his left hip incision but 1/13/2020  CONCLUSION: Normal examination. Dictated by (CST): Francesca Bueno MD on 1/13/2020 at 6:58     Approved by (CST):  Francesca Bueno MD on 1/13/2020 at 6:59          Nm Lung Vq Vent / Perfusion Scan  (FNZ=19990)    Result Date: 1/13/2020  C have answered all their questions about the procedure. They understand and agree to proceed. Thank you for allowing me to participate in the care of your patient.     ABDIRASHID GUSMAN  1/13/2020        *This note was dictated, in part, using a computeri

## 2020-01-13 NOTE — PROGRESS NOTES
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 He has No Known Allergies.     Current Anti-infective Medications:

## 2020-01-13 NOTE — PROCEDURES
Cardiologist: Yamilet Back MD  Primary Proceduralist: Yamilet aBck MD  Procedure Performed: LHC, COR, LV and Intra-aortic balloon pump placement  Date of Procedure: 1/13/2020   Indication: Non-ST elevation MI    Summary of findings: Severe distal left specimen collected  Estimated blood loss: 10 cc  Closure: IABP placed      IV was maintained by RN and moderate conscious sedation of versed and fentanyl was given.   Patient was assessed and monitoring of oxygen, heart rate and blood pressure by nurse and

## 2020-01-13 NOTE — PLAN OF CARE
Problem: Patient/Family Goals  Goal: Patient/Family Short Term Goal  Description  Patient's Short Term Goal: \"per Daughter - we have a caregiver at night for him, last time he had surgery he was disoriented\"    Interventions:   - Caregiver at night pro stated pain goal  Description  INTERVENTIONS:  - Encourage pt to monitor pain and request assistance  - Assess pain using appropriate pain scale  - Administer analgesics based on type and severity of pain and evaluate response  - Implement non-pharmacologi

## 2020-01-13 NOTE — PROGRESS NOTES
[place] [facility] - [facility]    Progress Note    Judith Sosa Patient Status:  Inpatient    1940 MRN [id number]   Location [facility] 4W/SW/SE Attending Concetta Boateng Day # 3 PCP No primary care provider on file. better  - mahan in place  -  UO improving     Chest pain- now resolved  Elevated troponins   Possible non-ST-elevation MI or PE  - VQ with no PE  - cardiology following   - cath today   - continue heparin gtt  - echo pending     Acute hypoxic resp failure (CST): Varinder Karimi MD on 1/12/2020 at 17:04          Us Venous Doppler Leg Bilat - Diag Img (cpt=93970)    Result Date: 1/13/2020  CONCLUSION: Normal examination. Dictated by (CST):  Hilary Levy MD on 1/13/2020 at 6:58     Approved by (CST

## 2020-01-13 NOTE — CONSULTS
[facility]    Report of Consultation    Che Quintana Patient Status:  Inpatient    1940 MRN [id number]   Location [facility] 2W/SW Attending Concetta Boateng Day # 3 PCP No primary care provider on file. PROCEDURE UNLISTED     • TOTAL HIP REPLACEMENT Left    • TOTAL KNEE REPLACEMENT Bilateral        Family History  History reviewed. No pertinent family history.     Social History  Social History    Tobacco Use      Smoking status: Former Smoker        Packs injection 2 mg, 2 mg, Intravenous, Q2H PRN    Or  morphINE sulfate (PF) 4 MG/ML injection 4 mg, 4 mg, Intravenous, Q2H PRN  Zolpidem Tartrate (AMBIEN) tab 5 mg, 5 mg, Oral, Nightly PRN  Senna (SENOKOT) tab 17.2 mg, 17.2 mg, Oral, Nightly  PEG 3350 (MIRALAX meals. Levothyroxine Sodium 50 MCG Oral Tab, Take 50 mcg by mouth before breakfast.  metFORMIN HCl  MG Oral Tablet 24 Hr, Take 500 mg by mouth daily with breakfast.  losartan 100 MG Oral Tab, Take by mouth daily.   mirtazapine 30 MG Oral Tab, Take 15 tip at the level of the left hilum. * Heart remains enlarged. * Congestive changes are seen with basilar atelectasis. * Little change from January 12, 2020. Dictated by (CST): Suzanne Del Real MD on 1/13/2020 at 13:18     Approved by (CST):  Suleiman Azevedo, subdural hematoma 2018  Neuro status is stable for now     6- anemia   Post-op / hip revision  Transfuse to keep hgb > 8       7- prophylaxis :   - DVT : already on IV heparin now   - GI : PPI   - c-diff : po Vancomycin     8- full code                Than

## 2020-01-13 NOTE — PLAN OF CARE
Patient states that he had chest pain 2 days prior to surgery but did not tell his doctor. Laura Garza he woke up with pain, it subsided then he went back to sleep and didn't tell anyone but his wife. Wife verified that this was what happened.

## 2020-01-13 NOTE — ANESTHESIA PREPROCEDURE EVALUATION
Anesthesia PreOp Note    HPI:     Shemar Atkins is a 78year old male who presents for preoperative consultation requested by: Buren Meigs, MD    Date of Surgery: 1/14/2020    Procedure(s):   HEART CORONARY ARTERY BYPASS GRAFT  Indication: hui 1/10/2020 at 0600  Levothyroxine Sodium 50 MCG Oral Tab, Take 50 mcg by mouth before breakfast., Disp: , Rfl: , 1/10/2020 at 0600  metFORMIN HCl  MG Oral Tablet 24 Hr, Take 500 mg by mouth daily with breakfast., Disp: , Rfl: , 1/9/2020 at 2100  reinier tablet at 01/13/20 0852  losartan (COZAAR) tab 100 mg, 100 mg, Oral, Daily, Smulkstys, Amor Weir MD, 100 mg at 01/12/20 1255  acetaminophen (TYLENOL EXTRA STRENGTH) tab 500 mg, 500 mg, Oral, Q6H PRN, Smulkstys, Jessica Gutierres MD  HYDROcodone-acetaminoph MAGNESIA) 400 MG/5ML suspension 30 mL, 30 mL, Oral, Daily PRN, SmulkstyCharisse lawler MD  bisacodyl (DULCOLAX) rectal suppository 10 mg, 10 mg, Rectal, Daily PRN, Smuleugene, Amor Weir MD  FLEET ENEMA (FLEET) 7-19 GM/118ML enema 133 mL, 1 enema, Recta Socioeconomic History      Marital status:       Spouse name: Not on file      Number of children: Not on file      Years of education: Not on file      Highest education level: Not on file    Occupational History      Not on file    Social Needs Component Value Date     01/13/2020    K 3.7 01/13/2020     01/13/2020    CO2 29.0 01/13/2020    BUN 15 01/13/2020    CREATSERUM 1.08 01/13/2020     (H) 01/13/2020    PGLU 131 (H) 01/13/2020    CA 7.9 (L) 01/13/2020          Vital Sign benefits, risks including possible dental damage if relevant, major complications, and any alternative forms of anesthetic management. All of the patient's questions were answered to the best of my ability.  The patient desires the anesthetic management a

## 2020-01-13 NOTE — PROGRESS NOTES
[facility] ID PROGRESS NOTE    Judithmikael Sosa Patient Status:  Inpatient    1940 MRN [id number]   Location [facility] 2W/SW Attending Concetta Boateng Angella Day # 3 PCP No primary care provider on file.      Subjective:  Awake, plans fo admitted by Dr Rafy Guillory for 1 stage revision and cultures and iv abx.  ID asked to eval. PT given iv vanco. 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

## 2020-01-13 NOTE — CM/SW NOTE
1/13: Patient accepted at [facility] pending bed available. SW to notify Kandis once d/c date is known. Addendum 1/14/2020:    Received MDO for home health evaluation. Patient is POD#0 s/p CABG.  Patient previously placed at [facility] in [facility]

## 2020-01-13 NOTE — PROGRESS NOTES
Misc. Note    Pt. Scheduled for CABG with Dr. Torri Dewey tomorrow. Written and verbal info provided to pt, his wife, son & daughter, who are at bedside, regarding zulay op expectations. STS risk score reviewed. Pt./family met with Surgeon.  All questions answer

## 2020-01-13 NOTE — PROGRESS NOTES
[facility]    Progress Note    Terrance Lilly Patient Status:  Inpatient    1940 MRN [id number]   Location [facility] 2W/SW Attending Concetta Boateng Day # 3 PCP No primary care provider on file.        Subj 01/13/2020     (H) 01/13/2020    CA 7.9 (L) 01/13/2020    ALB 2.1 (L) 01/13/2020    ALKPHO 71 01/13/2020    BILT 0.5 01/13/2020    TP 5.1 (L) 01/13/2020    AST 52 (H) 01/13/2020    ALT 8 (L) 01/13/2020    PTT 66.4 (H) 01/13/2020    MG 2.1 01/12/2020 occasional ectopic ventricular beat ABNORMAL When compared with ECG of 01/12/2020 14:46:34 No significant change Electronically signed on 22/67/9750 at 59:09 by Tyler Hare MD    Ekg 12-lead    Result Date: 1/12/2020  ECG Report  Interpretation

## 2020-01-13 NOTE — PROGRESS NOTES
More chest pain pt seen  ekg st dep  No diaphoresis  bp ok started nitro  Long dw family/nurse  dw dr Venita Oscar earlier as well

## 2020-01-14 ENCOUNTER — APPOINTMENT (OUTPATIENT)
Dept: GENERAL RADIOLOGY | Facility: HOSPITAL | Age: 80
DRG: 003 | End: 2020-01-14
Attending: CLINICAL NURSE SPECIALIST
Payer: MEDICARE

## 2020-01-14 ENCOUNTER — ANESTHESIA (OUTPATIENT)
Dept: SURGERY | Facility: HOSPITAL | Age: 80
DRG: 003 | End: 2020-01-14
Payer: MEDICARE

## 2020-01-14 LAB
ANION GAP SERPL CALC-SCNC: 3 MMOL/L (ref 0–18)
ANION GAP SERPL CALC-SCNC: 5 MMOL/L (ref 0–18)
APTT PPP: 67.1 SECONDS (ref 23.2–35.3)
BASE EXCESS BLD CALC-SCNC: 2.9 MMOL/L (ref ?–2)
BASE EXCESS BLD CALC-SCNC: 3.6 MMOL/L (ref ?–2)
BASOPHILS # BLD AUTO: 0.01 X10(3) UL (ref 0–0.2)
BASOPHILS # BLD AUTO: 0.03 X10(3) UL (ref 0–0.2)
BASOPHILS NFR BLD AUTO: 0.1 %
BASOPHILS NFR BLD AUTO: 0.2 %
BLOOD TYPE BARCODE: 5100
BLOOD TYPE BARCODE: 5100
BUN BLD-MCNC: 17 MG/DL (ref 7–18)
BUN BLD-MCNC: 17 MG/DL (ref 7–18)
BUN/CREAT SERPL: 15 (ref 10–20)
BUN/CREAT SERPL: 15.7 (ref 10–20)
CA-I BLD-SCNC: 1.18 MMOL/L (ref 0.95–1.32)
CA-I BLD-SCNC: 1.26 MMOL/L (ref 0.95–1.32)
CALCIUM BLD-MCNC: 8.1 MG/DL (ref 8.5–10.1)
CALCIUM BLD-MCNC: 8.3 MG/DL (ref 8.5–10.1)
CFT BLD TEG: 7.8 MIN (ref 5–10)
CFT P HPASE BLD TEG: 6.2 MIN (ref 5–10)
CFT P HPASE BLD TEG: 8.4 MIN (ref 5–10)
CHLORIDE SERPL-SCNC: 104 MMOL/L (ref 98–112)
CHLORIDE SERPL-SCNC: 109 MMOL/L (ref 98–112)
CLOT ANGLE BLD TEG: 66.8 DEG (ref 53–72)
CLOT ANGLE P HPASE BLD TEG: 69.3 DEG (ref 53–72)
CLOT ANGLE P HPASE BLD TEG: 75.2 DEG (ref 53–72)
CLOT LYSIS 30M P MA LENFR BLD TEG: 0.2 % (ref 0–8)
CLOT LYSIS 30M P MA LENFR BLD TEG: 0.2 % (ref 0–8)
CLOT LYSIS ESTIMATE PRCTL BLD TEG: 6.9 MIN
CLOT STRENGTH BLD TEG: 1.7 MIN (ref 1–3)
CLOT STRENGTH BLD TEG: 11.4 KD/SC (ref 4.5–11)
CLOT STRENGTH P HPASE BLD TEG: 1.1 MIN (ref 1–3)
CLOT STRENGTH P HPASE BLD TEG: 1.5 MIN (ref 1–3)
CLOT STRENGTH P HPASE BLD TEG: 14.1 KD/SC (ref 4.5–11)
CLOT STRENGTH P HPASE BLD TEG: 16.3 KD/SC (ref 4.5–11)
CLOT STRENGTH P HPASE BLD TEG: 5.8 MIN
CLOT STRENGTH P HPASE BLD TEG: 7.5 MIN
CO2 SERPL-SCNC: 28 MMOL/L (ref 21–32)
CO2 SERPL-SCNC: 31 MMOL/L (ref 21–32)
COHGB MFR BLD: 1.5 % (ref 0–1.5)
COHGB MFR BLD: 1.9 % (ref 0–1.5)
CREAT BLD-MCNC: 1.08 MG/DL (ref 0.7–1.3)
CREAT BLD-MCNC: 1.13 MG/DL (ref 0.7–1.3)
DEPRECATED RDW RBC AUTO: 55.5 FL (ref 35.1–46.3)
DEPRECATED RDW RBC AUTO: 57 FL (ref 35.1–46.3)
EOSINOPHIL # BLD AUTO: 0.34 X10(3) UL (ref 0–0.7)
EOSINOPHIL # BLD AUTO: 0.49 X10(3) UL (ref 0–0.7)
EOSINOPHIL NFR BLD AUTO: 2.3 %
EOSINOPHIL NFR BLD AUTO: 6.6 %
ERYTHROCYTE [DISTWIDTH] IN BLOOD BY AUTOMATED COUNT: 17.2 % (ref 11–15)
ERYTHROCYTE [DISTWIDTH] IN BLOOD BY AUTOMATED COUNT: 17.4 % (ref 11–15)
FIBRINOGEN PPP-MCNC: 548 MG/DL (ref 176–491)
GLUCOSE BLD-MCNC: 106 MG/DL (ref 70–99)
GLUCOSE BLD-MCNC: 119 MG/DL (ref 70–99)
GLUCOSE BLD-MCNC: 119 MG/DL (ref 70–99)
GLUCOSE BLD-MCNC: 140 MG/DL (ref 70–99)
GLUCOSE BLD-MCNC: 168 MG/DL (ref 70–99)
GLUCOSE BLD-MCNC: 173 MG/DL (ref 70–99)
GLUCOSE BLDC GLUCOMTR-MCNC: 118 MG/DL (ref 70–99)
GLUCOSE BLDC GLUCOMTR-MCNC: 125 MG/DL (ref 70–99)
GLUCOSE BLDC GLUCOMTR-MCNC: 133 MG/DL (ref 70–99)
GLUCOSE BLDC GLUCOMTR-MCNC: 144 MG/DL (ref 70–99)
GLUCOSE BLDC GLUCOMTR-MCNC: 145 MG/DL (ref 70–99)
GLUCOSE BLDC GLUCOMTR-MCNC: 149 MG/DL (ref 70–99)
GLUCOSE BLDC GLUCOMTR-MCNC: 149 MG/DL (ref 70–99)
GLUCOSE BLDC GLUCOMTR-MCNC: 152 MG/DL (ref 70–99)
GLUCOSE BLDC GLUCOMTR-MCNC: 152 MG/DL (ref 70–99)
GLUCOSE BLDC GLUCOMTR-MCNC: 160 MG/DL (ref 70–99)
GLUCOSE BLDC GLUCOMTR-MCNC: 99 MG/DL (ref 70–99)
HAV IGM SER QL: 3.4 MG/DL (ref 1.6–2.6)
HCO3 BLDA-SCNC: 27.2 MEQ/L (ref 21–27)
HCO3 BLDA-SCNC: 27.7 MEQ/L (ref 21–27)
HCT VFR BLD AUTO: 26.4 % (ref 39–53)
HCT VFR BLD AUTO: 28.9 % (ref 39–53)
HGB BLD-MCNC: 10.1 G/DL (ref 13.5–17.5)
HGB BLD-MCNC: 8.3 G/DL (ref 13–17.5)
HGB BLD-MCNC: 9 G/DL (ref 13.5–17.5)
HGB BLD-MCNC: 9.4 G/DL (ref 13–17.5)
IMM GRANULOCYTES # BLD AUTO: 0.02 X10(3) UL (ref 0–1)
IMM GRANULOCYTES # BLD AUTO: 0.17 X10(3) UL (ref 0–1)
IMM GRANULOCYTES NFR BLD: 0.3 %
IMM GRANULOCYTES NFR BLD: 1.2 %
INR BLD: 1.48 (ref 0.9–1.2)
ISTAT ACTIVATED CLOTTING TIME: 131 SECONDS (ref 125–137)
ISTAT ACTIVATED CLOTTING TIME: 147 SECONDS (ref 125–137)
ISTAT ACTIVATED CLOTTING TIME: 450 SECONDS (ref 125–137)
ISTAT ACTIVATED CLOTTING TIME: 560 SECONDS (ref 125–137)
ISTAT ACTIVATED CLOTTING TIME: 571 SECONDS (ref 125–137)
ISTAT BLOOD GAS BASE EXCESS: 2 MMOL/L (ref ?–30)
ISTAT BLOOD GAS BASE EXCESS: 3 MMOL/L (ref ?–30)
ISTAT BLOOD GAS BASE EXCESS: 5 MMOL/L (ref ?–30)
ISTAT BLOOD GAS BASE EXCESS: 6 MMOL/L (ref ?–30)
ISTAT BLOOD GAS HCO3: 26 MEQ/L (ref 22–26)
ISTAT BLOOD GAS HCO3: 27.4 MEQ/L (ref 22–26)
ISTAT BLOOD GAS HCO3: 29.2 MEQ/L (ref 22–26)
ISTAT BLOOD GAS HCO3: 30.2 MEQ/L (ref 22–26)
ISTAT BLOOD GAS O2 SATURATION: 100 % (ref 92–100)
ISTAT BLOOD GAS O2 SATURATION: 100 % (ref 92–100)
ISTAT BLOOD GAS O2 SATURATION: 91 % (ref 92–100)
ISTAT BLOOD GAS O2 SATURATION: 98 % (ref 92–100)
ISTAT BLOOD GAS PCO2: 38.3 MMHG (ref 35–45)
ISTAT BLOOD GAS PCO2: 41.5 MMHG (ref 35–45)
ISTAT BLOOD GAS PCO2: 43.1 MMHG (ref 35–45)
ISTAT BLOOD GAS PCO2: 43.5 MMHG (ref 35–45)
ISTAT BLOOD GAS PH: 7.43 (ref 7.35–7.45)
ISTAT BLOOD GAS PH: 7.44 (ref 7.35–7.45)
ISTAT BLOOD GAS PH: 7.44 (ref 7.35–7.45)
ISTAT BLOOD GAS PH: 7.45 (ref 7.35–7.45)
ISTAT BLOOD GAS PO2: 96 MMHG (ref 80–105)
ISTAT BLOOD GAS PO2: <70 MMHG (ref 80–105)
ISTAT BLOOD GAS PO2: >400 MMHG (ref 80–105)
ISTAT BLOOD GAS PO2: >400 MMHG (ref 80–105)
ISTAT BLOOD GAS TCO2: 27 MMOL/L (ref 22–32)
ISTAT BLOOD GAS TCO2: 29 MMOL/L (ref 22–32)
ISTAT BLOOD GAS TCO2: 31 MMOL/L (ref 22–32)
ISTAT BLOOD GAS TCO2: 31 MMOL/L (ref 22–32)
ISTAT HEMATOCRIT: 23 % (ref 37–53)
ISTAT HEMATOCRIT: 23 % (ref 37–53)
ISTAT HEMATOCRIT: 24 % (ref 37–53)
ISTAT HEMATOCRIT: 26 % (ref 37–53)
ISTAT IONIZED CALCIUM: 1.07 MMOL/L (ref 1.12–1.32)
ISTAT IONIZED CALCIUM: 1.07 MMOL/L (ref 1.12–1.32)
ISTAT IONIZED CALCIUM: 1.12 MMOL/L (ref 1.12–1.32)
ISTAT IONIZED CALCIUM: 1.25 MMOL/L (ref 1.12–1.32)
ISTAT POTASSIUM: 3.8 MMOL/L (ref 3.6–5.1)
ISTAT POTASSIUM: 3.9 MMOL/L (ref 3.6–5.1)
ISTAT POTASSIUM: 4 MMOL/L (ref 3.6–5.1)
ISTAT POTASSIUM: 4.1 MMOL/L (ref 3.6–5.1)
ISTAT SODIUM: 138 MMOL/L (ref 136–145)
ISTAT SODIUM: 138 MMOL/L (ref 136–145)
ISTAT SODIUM: 139 MMOL/L (ref 136–145)
ISTAT SODIUM: 140 MMOL/L (ref 136–145)
LACTIC ACID (BLOOD GAS): 0.9 MMOL/L (ref 0.5–2.2)
LACTIC ACID (BLOOD GAS): 1.3 MMOL/L (ref 0.5–2.2)
LYMPHOCYTES # BLD AUTO: 1 X10(3) UL (ref 1–4)
LYMPHOCYTES # BLD AUTO: 1.31 X10(3) UL (ref 1–4)
LYMPHOCYTES NFR BLD AUTO: 17.6 %
LYMPHOCYTES NFR BLD AUTO: 6.9 %
MCF BLD TEG: 61.3 MM
MCF BLD TEG: 69.5 MM (ref 50–70)
MCF BLD TEG: 79.1 MM
MCF P HPASE BLD TEG: 73.9 MM (ref 50–70)
MCF P HPASE BLD TEG: 76.5 MM (ref 50–70)
MCH RBC QN AUTO: 27.8 PG (ref 26–34)
MCH RBC QN AUTO: 28.9 PG (ref 26–34)
MCHC RBC AUTO-ENTMCNC: 31.4 G/DL (ref 31–37)
MCHC RBC AUTO-ENTMCNC: 32.5 G/DL (ref 31–37)
MCV RBC AUTO: 88.3 FL (ref 80–100)
MCV RBC AUTO: 88.9 FL (ref 80–100)
METHGB MFR BLD: 0.1 % SAT (ref 0.4–1.5)
METHGB MFR BLD: 0.7 % SAT (ref 0.4–1.5)
MONOCYTES # BLD AUTO: 0.85 X10(3) UL (ref 0.1–1)
MONOCYTES # BLD AUTO: 1.15 X10(3) UL (ref 0.1–1)
MONOCYTES NFR BLD AUTO: 11.4 %
MONOCYTES NFR BLD AUTO: 7.9 %
NEUTROPHILS # BLD AUTO: 11.86 X10 (3) UL (ref 1.5–7.7)
NEUTROPHILS # BLD AUTO: 11.86 X10(3) UL (ref 1.5–7.7)
NEUTROPHILS # BLD AUTO: 4.78 X10 (3) UL (ref 1.5–7.7)
NEUTROPHILS # BLD AUTO: 4.78 X10(3) UL (ref 1.5–7.7)
NEUTROPHILS NFR BLD AUTO: 64 %
NEUTROPHILS NFR BLD AUTO: 81.5 %
O2 CT BLD-SCNC: 11.6 VOL% (ref 15–23)
O2 CT BLD-SCNC: 13.6 VOL% (ref 15–23)
O2/TOTAL GAS SETTING VFR VENT: 40 %
O2/TOTAL GAS SETTING VFR VENT: 40 %
OSMOLALITY SERPL CALC.SUM OF ELEC: 292 MOSM/KG (ref 275–295)
OSMOLALITY SERPL CALC.SUM OF ELEC: 293 MOSM/KG (ref 275–295)
PCO2 BLDA: 44 MM HG (ref 35–45)
PCO2 BLDA: 44 MM HG (ref 35–45)
PEEP SETTING VENT: 5 CM H2O
PEEP SETTING VENT: 5 CM H2O
PH BLDA: 7.41 [PH] (ref 7.35–7.45)
PH BLDA: 7.42 [PH] (ref 7.35–7.45)
PLATELET # BLD AUTO: 161 10(3)UL (ref 150–450)
PLATELET # BLD AUTO: 98 10(3)UL (ref 150–450)
PLATELET MAPPING % INHIBITION (AA): 31 %
PLATELET MAPPING % INHIBITION (ADP): 0 %
PO2 BLDA: 59 MM HG (ref 80–100)
PO2 BLDA: 86 MM HG (ref 80–100)
POTASSIUM (BLOOD GAS): 3.9 MMOL/L (ref 3.3–5.1)
POTASSIUM (BLOOD GAS): 4 MMOL/L (ref 3.3–5.1)
POTASSIUM SERPL-SCNC: 3.9 MMOL/L (ref 3.5–5.1)
POTASSIUM SERPL-SCNC: 4.2 MMOL/L (ref 3.5–5.1)
PRESSURE SUPPORT SETTING VENT: 10 CM H2O
PRESSURE SUPPORT SETTING VENT: 5 CM H2O
PROTHROMBIN TIME: 17.9 SECONDS (ref 11.8–14.5)
PUNCTURE CHARGE: NO
PUNCTURE CHARGE: NO
RBC # BLD AUTO: 2.99 X10(6)UL (ref 3.8–5.8)
RBC # BLD AUTO: 3.25 X10(6)UL (ref 3.8–5.8)
RESP RATE: 12 BPM
SAO2 % BLDA: 93.3 % (ref 94–100)
SAO2 % BLDA: 96.5 % (ref 94–100)
SODIUM (BLOOD GAS): 135 MMOL/L (ref 135–145)
SODIUM (BLOOD GAS): 135 MMOL/L (ref 135–145)
SODIUM SERPL-SCNC: 140 MMOL/L (ref 136–145)
SODIUM SERPL-SCNC: 140 MMOL/L (ref 136–145)
SPECIMEN VOL 24H UR: 520 ML
TSI SER-ACNC: 3.25 MIU/ML (ref 0.36–3.74)
WBC # BLD AUTO: 14.6 X10(3) UL (ref 4–11)
WBC # BLD AUTO: 7.5 X10(3) UL (ref 4–11)

## 2020-01-14 PROCEDURE — 93312 ECHO TRANSESOPHAGEAL: CPT | Performed by: ANESTHESIOLOGY

## 2020-01-14 PROCEDURE — 021109W BYPASS CORONARY ARTERY, TWO ARTERIES FROM AORTA WITH AUTOLOGOUS VENOUS TISSUE, OPEN APPROACH: ICD-10-PCS | Performed by: THORACIC SURGERY (CARDIOTHORACIC VASCULAR SURGERY)

## 2020-01-14 PROCEDURE — 76942 ECHO GUIDE FOR BIOPSY: CPT | Performed by: ANESTHESIOLOGY

## 2020-01-14 PROCEDURE — 71045 X-RAY EXAM CHEST 1 VIEW: CPT | Performed by: CLINICAL NURSE SPECIALIST

## 2020-01-14 PROCEDURE — B246ZZ4 ULTRASONOGRAPHY OF RIGHT AND LEFT HEART, TRANSESOPHAGEAL: ICD-10-PCS | Performed by: THORACIC SURGERY (CARDIOTHORACIC VASCULAR SURGERY)

## 2020-01-14 PROCEDURE — 02WAXQZ REVISION OF IMPLANTABLE HEART ASSIST SYSTEM IN HEART, EXTERNAL APPROACH: ICD-10-PCS | Performed by: INTERNAL MEDICINE

## 2020-01-14 PROCEDURE — 36430 TRANSFUSION BLD/BLD COMPNT: CPT | Performed by: ANESTHESIOLOGY

## 2020-01-14 PROCEDURE — 06BP4ZZ EXCISION OF RIGHT SAPHENOUS VEIN, PERCUTANEOUS ENDOSCOPIC APPROACH: ICD-10-PCS | Performed by: THORACIC SURGERY (CARDIOTHORACIC VASCULAR SURGERY)

## 2020-01-14 PROCEDURE — 99233 SBSQ HOSP IP/OBS HIGH 50: CPT | Performed by: INTERNAL MEDICINE

## 2020-01-14 PROCEDURE — 5A09357 ASSISTANCE WITH RESPIRATORY VENTILATION, LESS THAN 24 CONSECUTIVE HOURS, CONTINUOUS POSITIVE AIRWAY PRESSURE: ICD-10-PCS | Performed by: INTERNAL MEDICINE

## 2020-01-14 PROCEDURE — 02100Z9 BYPASS CORONARY ARTERY, ONE ARTERY FROM LEFT INTERNAL MAMMARY, OPEN APPROACH: ICD-10-PCS | Performed by: THORACIC SURGERY (CARDIOTHORACIC VASCULAR SURGERY)

## 2020-01-14 RX ORDER — CALCIUM CHLORIDE 100 MG/ML
INJECTION INTRAVENOUS; INTRAVENTRICULAR AS NEEDED
Status: DISCONTINUED | OUTPATIENT
Start: 2020-01-14 | End: 2020-01-14 | Stop reason: SURG

## 2020-01-14 RX ORDER — ACETAMINOPHEN 325 MG/1
650 TABLET ORAL EVERY 6 HOURS PRN
Status: DISCONTINUED | OUTPATIENT
Start: 2020-01-14 | End: 2020-02-04

## 2020-01-14 RX ORDER — DOBUTAMINE HYDROCHLORIDE 200 MG/100ML
INJECTION INTRAVENOUS CONTINUOUS PRN
Status: DISCONTINUED | OUTPATIENT
Start: 2020-01-14 | End: 2020-01-30

## 2020-01-14 RX ORDER — CHLORHEXIDINE GLUCONATE 0.12 MG/ML
15 RINSE ORAL
Status: DISCONTINUED | OUTPATIENT
Start: 2020-01-14 | End: 2020-02-04

## 2020-01-14 RX ORDER — PHENYLEPHRINE HCL 10 MG/ML
VIAL (ML) INJECTION AS NEEDED
Status: DISCONTINUED | OUTPATIENT
Start: 2020-01-14 | End: 2020-01-14 | Stop reason: SURG

## 2020-01-14 RX ORDER — ACETAMINOPHEN 160 MG/5ML
650 SOLUTION ORAL EVERY 6 HOURS PRN
Status: DISCONTINUED | OUTPATIENT
Start: 2020-01-14 | End: 2020-02-04

## 2020-01-14 RX ORDER — CEFAZOLIN SODIUM 1 G/3ML
INJECTION, POWDER, FOR SOLUTION INTRAMUSCULAR; INTRAVENOUS AS NEEDED
Status: DISCONTINUED | OUTPATIENT
Start: 2020-01-14 | End: 2020-01-14 | Stop reason: HOSPADM

## 2020-01-14 RX ORDER — FAMOTIDINE 20 MG/1
20 TABLET ORAL 2 TIMES DAILY
Status: DISCONTINUED | OUTPATIENT
Start: 2020-01-14 | End: 2020-01-17

## 2020-01-14 RX ORDER — ACETAMINOPHEN 10 MG/ML
1000 INJECTION, SOLUTION INTRAVENOUS EVERY 6 HOURS PRN
Status: COMPLETED | OUTPATIENT
Start: 2020-01-14 | End: 2020-01-15

## 2020-01-14 RX ORDER — METOCLOPRAMIDE HYDROCHLORIDE 5 MG/ML
10 INJECTION INTRAMUSCULAR; INTRAVENOUS EVERY 6 HOURS
Status: DISCONTINUED | OUTPATIENT
Start: 2020-01-14 | End: 2020-01-18

## 2020-01-14 RX ORDER — DOXEPIN HYDROCHLORIDE 50 MG/1
1 CAPSULE ORAL DAILY
Status: DISCONTINUED | OUTPATIENT
Start: 2020-01-15 | End: 2020-02-04

## 2020-01-14 RX ORDER — CHLORHEXIDINE GLUCONATE 0.12 MG/ML
15 RINSE ORAL 2 TIMES DAILY
Status: DISCONTINUED | OUTPATIENT
Start: 2020-01-14 | End: 2020-01-14

## 2020-01-14 RX ORDER — PROTAMINE SULFATE 10 MG/ML
INJECTION, SOLUTION INTRAVENOUS AS NEEDED
Status: DISCONTINUED | OUTPATIENT
Start: 2020-01-14 | End: 2020-01-14 | Stop reason: SURG

## 2020-01-14 RX ORDER — SODIUM CHLORIDE, SODIUM LACTATE, POTASSIUM CHLORIDE, CALCIUM CHLORIDE 600; 310; 30; 20 MG/100ML; MG/100ML; MG/100ML; MG/100ML
INJECTION, SOLUTION INTRAVENOUS CONTINUOUS PRN
Status: DISCONTINUED | OUTPATIENT
Start: 2020-01-14 | End: 2020-01-14 | Stop reason: SURG

## 2020-01-14 RX ORDER — CLOPIDOGREL BISULFATE 75 MG/1
75 TABLET ORAL DAILY
Status: DISCONTINUED | OUTPATIENT
Start: 2020-01-15 | End: 2020-01-19

## 2020-01-14 RX ORDER — FAMOTIDINE 10 MG/ML
20 INJECTION, SOLUTION INTRAVENOUS 2 TIMES DAILY
Status: DISCONTINUED | OUTPATIENT
Start: 2020-01-14 | End: 2020-01-17

## 2020-01-14 RX ORDER — SODIUM PHOSPHATE, DIBASIC AND SODIUM PHOSPHATE, MONOBASIC 7; 19 G/133ML; G/133ML
1 ENEMA RECTAL ONCE AS NEEDED
Status: DISCONTINUED | OUTPATIENT
Start: 2020-01-14 | End: 2020-01-17

## 2020-01-14 RX ORDER — BISACODYL 10 MG
10 SUPPOSITORY, RECTAL RECTAL
Status: DISCONTINUED | OUTPATIENT
Start: 2020-01-14 | End: 2020-02-04

## 2020-01-14 RX ORDER — MAGNESIUM SULFATE HEPTAHYDRATE 40 MG/ML
2 INJECTION, SOLUTION INTRAVENOUS AS NEEDED
Status: DISCONTINUED | OUTPATIENT
Start: 2020-01-14 | End: 2020-02-04

## 2020-01-14 RX ORDER — LIDOCAINE HYDROCHLORIDE 20 MG/ML
INJECTION, SOLUTION EPIDURAL; INFILTRATION; INTRACAUDAL; PERINEURAL
Status: COMPLETED
Start: 2020-01-14 | End: 2020-01-14

## 2020-01-14 RX ORDER — MAGNESIUM HYDROXIDE 1200 MG/15ML
LIQUID ORAL CONTINUOUS PRN
Status: DISCONTINUED | OUTPATIENT
Start: 2020-01-14 | End: 2020-01-23

## 2020-01-14 RX ORDER — GLYCOPYRROLATE 0.2 MG/ML
0.6 INJECTION, SOLUTION INTRAMUSCULAR; INTRAVENOUS ONCE
Status: COMPLETED | OUTPATIENT
Start: 2020-01-14 | End: 2020-01-14

## 2020-01-14 RX ORDER — HEPARIN SODIUM 1000 [USP'U]/ML
INJECTION, SOLUTION INTRAVENOUS; SUBCUTANEOUS AS NEEDED
Status: DISCONTINUED | OUTPATIENT
Start: 2020-01-14 | End: 2020-01-14 | Stop reason: HOSPADM

## 2020-01-14 RX ORDER — ACETAMINOPHEN 650 MG/1
650 SUPPOSITORY RECTAL EVERY 6 HOURS PRN
Status: DISCONTINUED | OUTPATIENT
Start: 2020-01-14 | End: 2020-02-04

## 2020-01-14 RX ORDER — ONDANSETRON 2 MG/ML
4 INJECTION INTRAMUSCULAR; INTRAVENOUS EVERY 6 HOURS PRN
Status: DISCONTINUED | OUTPATIENT
Start: 2020-01-14 | End: 2020-02-04

## 2020-01-14 RX ORDER — DOCUSATE SODIUM 100 MG/1
100 CAPSULE, LIQUID FILLED ORAL 2 TIMES DAILY
Status: DISCONTINUED | OUTPATIENT
Start: 2020-01-15 | End: 2020-01-24

## 2020-01-14 RX ORDER — DEXTROSE AND SODIUM CHLORIDE 5; .9 G/100ML; G/100ML
INJECTION, SOLUTION INTRAVENOUS CONTINUOUS
Status: DISCONTINUED | OUTPATIENT
Start: 2020-01-14 | End: 2020-01-15

## 2020-01-14 RX ORDER — SODIUM PHOSPHATE, DIBASIC AND SODIUM PHOSPHATE, MONOBASIC 7; 19 G/133ML; G/133ML
1 ENEMA RECTAL ONCE AS NEEDED
Status: DISCONTINUED | OUTPATIENT
Start: 2020-01-14 | End: 2020-01-14

## 2020-01-14 RX ORDER — ENOXAPARIN SODIUM 100 MG/ML
40 INJECTION SUBCUTANEOUS DAILY
Status: DISCONTINUED | OUTPATIENT
Start: 2020-01-15 | End: 2020-01-16

## 2020-01-14 RX ORDER — POTASSIUM CHLORIDE 14.9 MG/ML
20 INJECTION INTRAVENOUS AS NEEDED
Status: DISCONTINUED | OUTPATIENT
Start: 2020-01-14 | End: 2020-02-04

## 2020-01-14 RX ORDER — POTASSIUM CHLORIDE 29.8 MG/ML
40 INJECTION INTRAVENOUS AS NEEDED
Status: DISCONTINUED | OUTPATIENT
Start: 2020-01-14 | End: 2020-02-04

## 2020-01-14 RX ORDER — ROCURONIUM BROMIDE 10 MG/ML
INJECTION, SOLUTION INTRAVENOUS AS NEEDED
Status: DISCONTINUED | OUTPATIENT
Start: 2020-01-14 | End: 2020-01-14 | Stop reason: SURG

## 2020-01-14 RX ORDER — ASPIRIN 81 MG/1
81 TABLET ORAL DAILY
Status: DISCONTINUED | OUTPATIENT
Start: 2020-01-15 | End: 2020-01-29 | Stop reason: SDUPTHER

## 2020-01-14 RX ORDER — MAGNESIUM SULFATE 1 G/100ML
1 INJECTION INTRAVENOUS AS NEEDED
Status: DISCONTINUED | OUTPATIENT
Start: 2020-01-14 | End: 2020-02-04

## 2020-01-14 RX ORDER — POLYETHYLENE GLYCOL 3350 17 G/17G
17 POWDER, FOR SOLUTION ORAL DAILY PRN
Status: DISCONTINUED | OUTPATIENT
Start: 2020-01-14 | End: 2020-02-04

## 2020-01-14 RX ORDER — NITROGLYCERIN 20 MG/100ML
INJECTION INTRAVENOUS CONTINUOUS PRN
Status: DISCONTINUED | OUTPATIENT
Start: 2020-01-14 | End: 2020-01-30

## 2020-01-14 RX ORDER — ASCORBIC ACID 500 MG
500 TABLET ORAL 3 TIMES DAILY
Status: DISCONTINUED | OUTPATIENT
Start: 2020-01-15 | End: 2020-02-04

## 2020-01-14 RX ORDER — DOBUTAMINE HYDROCHLORIDE 100 MG/100ML
INJECTION INTRAVENOUS CONTINUOUS PRN
Status: DISCONTINUED | OUTPATIENT
Start: 2020-01-14 | End: 2020-01-14 | Stop reason: SURG

## 2020-01-14 RX ORDER — CEFAZOLIN SODIUM/WATER 2 G/20 ML
2 SYRINGE (ML) INTRAVENOUS EVERY 8 HOURS
Status: COMPLETED | OUTPATIENT
Start: 2020-01-14 | End: 2020-01-16

## 2020-01-14 RX ORDER — SENNOSIDES 8.6 MG
8.6 TABLET ORAL 2 TIMES DAILY
Status: DISCONTINUED | OUTPATIENT
Start: 2020-01-15 | End: 2020-01-24

## 2020-01-14 RX ORDER — MAGNESIUM OXIDE 400 MG (241.3 MG MAGNESIUM) TABLET
3 TABLET NIGHTLY PRN
Status: DISCONTINUED | OUTPATIENT
Start: 2020-01-15 | End: 2020-02-04

## 2020-01-14 RX ORDER — ACETAMINOPHEN 500 MG
500 TABLET ORAL EVERY 6 HOURS PRN
Status: DISCONTINUED | OUTPATIENT
Start: 2020-01-14 | End: 2020-02-04

## 2020-01-14 RX ORDER — MAGNESIUM SULFATE HEPTAHYDRATE 500 MG/ML
INJECTION, SOLUTION INTRAMUSCULAR; INTRAVENOUS AS NEEDED
Status: DISCONTINUED | OUTPATIENT
Start: 2020-01-14 | End: 2020-01-14 | Stop reason: SURG

## 2020-01-14 RX ORDER — BISACODYL 10 MG
10 SUPPOSITORY, RECTAL RECTAL
Status: DISCONTINUED | OUTPATIENT
Start: 2020-01-14 | End: 2020-01-14

## 2020-01-14 RX ORDER — AMIODARONE HYDROCHLORIDE 200 MG/1
200 TABLET ORAL
Status: DISCONTINUED | OUTPATIENT
Start: 2020-01-14 | End: 2020-02-01

## 2020-01-14 RX ORDER — POLYETHYLENE GLYCOL 3350 17 G/17G
17 POWDER, FOR SOLUTION ORAL DAILY PRN
Status: DISCONTINUED | OUTPATIENT
Start: 2020-01-14 | End: 2020-01-21

## 2020-01-14 RX ORDER — MILRINONE LACTATE 0.2 MG/ML
0.38 INJECTION, SOLUTION INTRAVENOUS AS NEEDED
Status: DISCONTINUED | OUTPATIENT
Start: 2020-01-14 | End: 2020-01-30

## 2020-01-14 RX ORDER — ALBUMIN, HUMAN INJ 5% 5 %
250 SOLUTION INTRAVENOUS ONCE AS NEEDED
Status: DISCONTINUED | OUTPATIENT
Start: 2020-01-14 | End: 2020-02-04

## 2020-01-14 RX ORDER — HEPARIN SODIUM 1000 [USP'U]/ML
INJECTION, SOLUTION INTRAVENOUS; SUBCUTANEOUS AS NEEDED
Status: DISCONTINUED | OUTPATIENT
Start: 2020-01-14 | End: 2020-01-14 | Stop reason: SURG

## 2020-01-14 RX ORDER — NEOSTIGMINE METHYLSULFATE 0.5 MG/ML
3 INJECTION INTRAVENOUS ONCE
Status: COMPLETED | OUTPATIENT
Start: 2020-01-14 | End: 2020-01-14

## 2020-01-14 RX ADMIN — PHENYLEPHRINE HCL 100 MCG: 10 MG/ML VIAL (ML) INJECTION at 07:14:00

## 2020-01-14 RX ADMIN — CEFAZOLIN SODIUM/WATER 2 G: 2 G/20 ML SYRINGE (ML) INTRAVENOUS at 07:30:00

## 2020-01-14 RX ADMIN — CALCIUM CHLORIDE 1 G: 100 INJECTION INTRAVENOUS; INTRAVENTRICULAR at 10:51:00

## 2020-01-14 RX ADMIN — SODIUM CHLORIDE, SODIUM LACTATE, POTASSIUM CHLORIDE, CALCIUM CHLORIDE: 600; 310; 30; 20 INJECTION, SOLUTION INTRAVENOUS at 12:23:00

## 2020-01-14 RX ADMIN — SODIUM CHLORIDE, SODIUM LACTATE, POTASSIUM CHLORIDE, CALCIUM CHLORIDE: 600; 310; 30; 20 INJECTION, SOLUTION INTRAVENOUS at 06:57:00

## 2020-01-14 RX ADMIN — ROCURONIUM BROMIDE 100 MG: 10 INJECTION, SOLUTION INTRAVENOUS at 07:14:00

## 2020-01-14 RX ADMIN — ROCURONIUM BROMIDE 50 MG: 10 INJECTION, SOLUTION INTRAVENOUS at 08:59:00

## 2020-01-14 RX ADMIN — DOBUTAMINE HYDROCHLORIDE 3 MCG/KG/MIN: 100 INJECTION INTRAVENOUS at 10:11:00

## 2020-01-14 RX ADMIN — HEPARIN SODIUM 36000 UNITS: 1000 INJECTION, SOLUTION INTRAVENOUS; SUBCUTANEOUS at 09:15:00

## 2020-01-14 RX ADMIN — DOBUTAMINE HYDROCHLORIDE 3 MCG/KG/MIN: 100 INJECTION INTRAVENOUS at 07:48:00

## 2020-01-14 RX ADMIN — MAGNESIUM SULFATE HEPTAHYDRATE 2 G: 500 INJECTION, SOLUTION INTRAMUSCULAR; INTRAVENOUS at 10:41:00

## 2020-01-14 RX ADMIN — PROTAMINE SULFATE 400 MG: 10 INJECTION, SOLUTION INTRAVENOUS at 11:09:00

## 2020-01-14 RX ADMIN — DOBUTAMINE HYDROCHLORIDE 5 MCG/KG/MIN: 100 INJECTION INTRAVENOUS at 10:51:00

## 2020-01-14 NOTE — ANESTHESIA PROCEDURE NOTES
Arterial Line  Performed by: Leelee Olivarez MD  Authorized by: Leelee Olivarez MD     General Information and Staff    Procedure Start:   Anesthesiologist: Leelee Olivarez MD  Performed By:  Anesthesiologist  Patient Location:  OR  Indication: continuous b

## 2020-01-14 NOTE — ANESTHESIA PROCEDURE NOTES
Central Line  Performed by: Jeannie Boast, MD  Authorized by: Jeannie Boast, MD     General Information and Staff    Procedure Start:   Anesthesiologist: Jeannie Boast, MD  Performed by:   Anesthesiologist  Patient Location:  OR  Indication: central venous

## 2020-01-14 NOTE — OPERATIVE REPORT
[facility] OPERATING ROOM  Operative Note     Roberto Carlos Fletcher Location: OR   [facility] [id number] MRN [id number]   Admission Date 1/10/2020 Operation Date 1/14/2020   Attending Physician Luis Mosqueda DO Operating Physician Yousif Bills MD 64-year-old gentleman who just undergone hip replacement for infected joint on Friday, January 10. During the procedure he required some epinephrine and postop he developed a MI with troponin elevation and congestive heart failure findings.   A cardiac cat appropriate range. The patient was cannulated started on cardiopulmonary bypass and cooled to 33 degrees. The aorta was crossclamped, antegrade and retrograde cardioplegia were administered.   After the initial dose additional doses were given after each absorbable sutures. Patient was monitored in the operating room to pass bleeding parameters before transferring to the surgical heart unit. All counts were announced as correct.   The family was notified by me at the end of the procedure of the patient's

## 2020-01-14 NOTE — PLAN OF CARE
Problem: Patient/Family Goals  Goal: Patient/Family Long Term Goal  Description  Patient's Long Term Goal: \"To be able to get back home and walking\"    Interventions:  - Patient will need to discharge to rehab when cleared for further strength and mobi comfort level or patient's stated pain goal  Description  INTERVENTIONS:  - Encourage pt to monitor pain and request assistance  - Assess pain using appropriate pain scale  - Administer analgesics based on type and severity of pain and evaluate response  - factors for pressure ulcer development  - Assess and document skin integrity  - Monitor for areas of redness and/or skin breakdown  - Initiate interventions, skin care algorithm/standards of care as needed  Outcome: Progressing     Problem: HEMATOLOGIC - A

## 2020-01-14 NOTE — ANESTHESIA PROCEDURE NOTES
Airway  Urgency: Elective      General Information and Staff    Patient location during procedure: OR  Anesthesiologist: Ebenezer Allen MD  Performed: anesthesiologist     Indications and Patient Condition  Indications for airway management: anesthesia  Se

## 2020-01-14 NOTE — PROGRESS NOTES
[facility]     [facility] Cardiology Progress Note    Roxanne Kinga Patient Status:  Inpatient    1940 MRN [id number]   Location [facility] 2W/SW Attending Avery Obrien Day # 4 PCP No primary care provider on aspirin  81 mg Oral Daily   • ceFAZolin  2 g Intravenous Q8H   • Amiodarone HCl  200 mg Oral TID CC   • [MAR Hold] sodium chloride   Intravenous Once   • vancomycin HCl  125 mg Oral BID   • vancomycin  15 mg/kg Intravenous Q24H   • [MAR Hold] gabapentin  6 105 104 109   CO2 29.0 31.0 31.0 28.0   ALKPHO 71  --   --   --    AST 52*  --   --   --    ALT 8*  --   --   --    BILT 0.5  --   --   --    TP 5.1*  --   --   --         Recent Labs   Lab 01/12/20  1505 01/12/20  1721 01/13/20  0417   TROP 3.880* 3.930* (d/t mechanical fall while anticoagulated), here for L hip revision for infected hip. Postop course c/b NSTEMI. [facility] yesterday with severe multivessel disease with placement of IABP, now POD#0 s/p urgent 3v CABG (LIMA-LAD, SVG-OM, SVG-diag).   - lines, drips

## 2020-01-14 NOTE — ANESTHESIA POSTPROCEDURE EVALUATION
Patient: Eda Castillo    Procedure Summary     Date:  01/14/20 Room / Location:  [address] [place] [address] MAIN OR 18 / [address] [place] [address] MAIN OR    Anesthesia Start:  1070 Anesthesia Stop:  2041    Procedure:  HEART CORONARY ARTERY BYPASS GRAFT (N/A ) Diagnosis:  (coronary artery disea

## 2020-01-14 NOTE — ANESTHESIA PROCEDURE NOTES
Procedure Performed: BERNIE    Start Time:        End Time:      Preanesthesia Checklist:  Patient identified, IV assessed, risks and benefits discussed, monitors and equipment assessed, procedure being performed at surgeon's request and anesthesia consent ob appendage normal.      Septa    Atrial Septum:  Intra-atrial septal morphology normal.      Ventricular Septum:  Intra-ventricular septum morphology normal.              Anesthesia Information  Performed Personally  Anesthesiologist:  Avinash Guzman MD

## 2020-01-14 NOTE — PROGRESS NOTES
[facility]    Progress Note    Deandre Paredes Patient Status:  Inpatient    1940 MRN [id number]   Location [facility] 2W/SW Attending Anamika Mendoza,    Hosp Day # 4 PCP No primary care provider on file.         Subj c-diff : po Vancomycin      8- full code      D/w Dr Herman Stoddard  D/w staff                               Results:     Lab Results   Component Value Date    WBC 14.6 (H) 01/14/2020    HGB 9.4 (L) 01/14/2020    HCT 28.9 (L) 01/14/2020    PLT 98.0 (L) 01/14/2020 of stenosis in the left ICA as discussed. Mild plaque in the left carotid bulb and proximal left ICA.     Dictated by (CST): Kenna Almeida MD on 1/13/2020 at 21:05     Approved by (CST): Kenna Almeida MD on 1/13/2020 at 21:12          Us Venous Doppler L -------------------------- Sinus Rhythm -First degree A-V block BORDERLINE ECG When compared with ECG of 01/12/2020 12:33:20 no significant changes have occurred Electronically signed on 01/12/2020 at 17:09 by Aminah Burrell.  Vince rCuz MD

## 2020-01-14 NOTE — PROGRESS NOTES
Pt prepped for surgery early am, betasept bath, bicarb drip infusing, labs drawn, Dr Junior Been called with hgb 8.4, vss, will transfuse in OR.   Family in at [facility], pt in good spirits now, less anxious, sitter at bedside thru night, pt slept off and on, Ms 2mg

## 2020-01-14 NOTE — PLAN OF CARE
Problem: Patient/Family Goals  Goal: Patient/Family Long Term Goal  Description  Patient's Long Term Goal: \"To be able to get back home and walking\"    Interventions:  - Patient will need to discharge to rehab when cleared for further strength and mobi comfort level or patient's stated pain goal  Description  INTERVENTIONS:  - Encourage pt to monitor pain and request assistance  - Assess pain using appropriate pain scale  - Administer analgesics based on type and severity of pain and evaluate response  - factors for pressure ulcer development  - Assess and document skin integrity  - Monitor for areas of redness and/or skin breakdown  - Initiate interventions, skin care algorithm/standards of care as needed  Outcome: Progressing     Problem: HEMATOLOGIC - A Continuous cardiac monitoring, monitor vital signs, obtain 12 lead EKG if indicated  - Evaluate effectiveness of antiarrhythmic and heart rate control medications as ordered  - Initiate emergency measures for life threatening arrhythmias  - Monitor electro and in slg reverse trendelenburg, has occ prod strong cough clear whitish phlegm, small amt, good pedal pulses, confirmed with doppler, left hip drsg cdi, abductor brace in place, rt groin IABP site cdi, pt takes his pills w/o difficulty, gave norco at hs

## 2020-01-15 ENCOUNTER — APPOINTMENT (OUTPATIENT)
Dept: GENERAL RADIOLOGY | Facility: HOSPITAL | Age: 80
DRG: 003 | End: 2020-01-15
Attending: INTERNAL MEDICINE
Payer: MEDICARE

## 2020-01-15 LAB
ANION GAP SERPL CALC-SCNC: 7 MMOL/L (ref 0–18)
BASOPHILS # BLD AUTO: 0.03 X10(3) UL (ref 0–0.2)
BASOPHILS NFR BLD AUTO: 0.2 %
BLOOD TYPE BARCODE: 5100
BUN BLD-MCNC: 21 MG/DL (ref 7–18)
BUN/CREAT SERPL: 14.3 (ref 10–20)
CALCIUM BLD-MCNC: 7.9 MG/DL (ref 8.5–10.1)
CHLORIDE SERPL-SCNC: 106 MMOL/L (ref 98–112)
CO2 SERPL-SCNC: 28 MMOL/L (ref 21–32)
CREAT BLD-MCNC: 1.47 MG/DL (ref 0.7–1.3)
DEPRECATED RDW RBC AUTO: 56.2 FL (ref 35.1–46.3)
DEPRECATED RDW RBC AUTO: 59.2 FL (ref 35.1–46.3)
EOSINOPHIL # BLD AUTO: 0 X10(3) UL (ref 0–0.7)
EOSINOPHIL NFR BLD AUTO: 0 %
ERYTHROCYTE [DISTWIDTH] IN BLOOD BY AUTOMATED COUNT: 17.4 % (ref 11–15)
ERYTHROCYTE [DISTWIDTH] IN BLOOD BY AUTOMATED COUNT: 17.7 % (ref 11–15)
GLUCOSE BLD-MCNC: 128 MG/DL (ref 70–99)
GLUCOSE BLDC GLUCOMTR-MCNC: 100 MG/DL (ref 70–99)
GLUCOSE BLDC GLUCOMTR-MCNC: 101 MG/DL (ref 70–99)
GLUCOSE BLDC GLUCOMTR-MCNC: 106 MG/DL (ref 70–99)
GLUCOSE BLDC GLUCOMTR-MCNC: 109 MG/DL (ref 70–99)
GLUCOSE BLDC GLUCOMTR-MCNC: 110 MG/DL (ref 70–99)
GLUCOSE BLDC GLUCOMTR-MCNC: 112 MG/DL (ref 70–99)
GLUCOSE BLDC GLUCOMTR-MCNC: 113 MG/DL (ref 70–99)
GLUCOSE BLDC GLUCOMTR-MCNC: 115 MG/DL (ref 70–99)
GLUCOSE BLDC GLUCOMTR-MCNC: 116 MG/DL (ref 70–99)
GLUCOSE BLDC GLUCOMTR-MCNC: 116 MG/DL (ref 70–99)
GLUCOSE BLDC GLUCOMTR-MCNC: 117 MG/DL (ref 70–99)
GLUCOSE BLDC GLUCOMTR-MCNC: 118 MG/DL (ref 70–99)
GLUCOSE BLDC GLUCOMTR-MCNC: 119 MG/DL (ref 70–99)
GLUCOSE BLDC GLUCOMTR-MCNC: 121 MG/DL (ref 70–99)
GLUCOSE BLDC GLUCOMTR-MCNC: 124 MG/DL (ref 70–99)
GLUCOSE BLDC GLUCOMTR-MCNC: 125 MG/DL (ref 70–99)
GLUCOSE BLDC GLUCOMTR-MCNC: 125 MG/DL (ref 70–99)
GLUCOSE BLDC GLUCOMTR-MCNC: 126 MG/DL (ref 70–99)
GLUCOSE BLDC GLUCOMTR-MCNC: 127 MG/DL (ref 70–99)
GLUCOSE BLDC GLUCOMTR-MCNC: 128 MG/DL (ref 70–99)
GLUCOSE BLDC GLUCOMTR-MCNC: 132 MG/DL (ref 70–99)
GLUCOSE BLDC GLUCOMTR-MCNC: 133 MG/DL (ref 70–99)
GLUCOSE BLDC GLUCOMTR-MCNC: 140 MG/DL (ref 70–99)
GLUCOSE BLDC GLUCOMTR-MCNC: 143 MG/DL (ref 70–99)
GLUCOSE BLDC GLUCOMTR-MCNC: 146 MG/DL (ref 70–99)
HAV IGM SER QL: 3 MG/DL (ref 1.6–2.6)
HAV IGM SER QL: 3.1 MG/DL (ref 1.6–2.6)
HCT VFR BLD AUTO: 29.4 % (ref 39–53)
HCT VFR BLD AUTO: 30.3 % (ref 39–53)
HGB BLD-MCNC: 9.5 G/DL (ref 13–17.5)
HGB BLD-MCNC: 9.8 G/DL (ref 13–17.5)
IMM GRANULOCYTES # BLD AUTO: 0.05 X10(3) UL (ref 0–1)
IMM GRANULOCYTES NFR BLD: 0.4 %
LYMPHOCYTES # BLD AUTO: 0.78 X10(3) UL (ref 1–4)
LYMPHOCYTES NFR BLD AUTO: 6.2 %
MCH RBC QN AUTO: 28.3 PG (ref 26–34)
MCH RBC QN AUTO: 29.2 PG (ref 26–34)
MCHC RBC AUTO-ENTMCNC: 32.3 G/DL (ref 31–37)
MCHC RBC AUTO-ENTMCNC: 32.3 G/DL (ref 31–37)
MCV RBC AUTO: 87.6 FL (ref 80–100)
MCV RBC AUTO: 90.5 FL (ref 80–100)
MONOCYTES # BLD AUTO: 1.22 X10(3) UL (ref 0.1–1)
MONOCYTES NFR BLD AUTO: 9.8 %
NEUTROPHILS # BLD AUTO: 10.41 X10 (3) UL (ref 1.5–7.7)
NEUTROPHILS # BLD AUTO: 10.41 X10(3) UL (ref 1.5–7.7)
NEUTROPHILS NFR BLD AUTO: 83.4 %
OSMOLALITY SERPL CALC.SUM OF ELEC: 297 MOSM/KG (ref 275–295)
PLATELET # BLD AUTO: 144 10(3)UL (ref 150–450)
PLATELET # BLD AUTO: 161 10(3)UL (ref 150–450)
POTASSIUM SERPL-SCNC: 4 MMOL/L (ref 3.5–5.1)
POTASSIUM SERPL-SCNC: 4.1 MMOL/L (ref 3.5–5.1)
RBC # BLD AUTO: 3.25 X10(6)UL (ref 3.8–5.8)
RBC # BLD AUTO: 3.46 X10(6)UL (ref 3.8–5.8)
SODIUM SERPL-SCNC: 141 MMOL/L (ref 136–145)
VANCOMYCIN TROUGH SERPL-MCNC: 14.8 UG/ML (ref 10–20)
WBC # BLD AUTO: 12.5 X10(3) UL (ref 4–11)
WBC # BLD AUTO: 14.4 X10(3) UL (ref 4–11)

## 2020-01-15 PROCEDURE — 71045 X-RAY EXAM CHEST 1 VIEW: CPT | Performed by: INTERNAL MEDICINE

## 2020-01-15 PROCEDURE — 99233 SBSQ HOSP IP/OBS HIGH 50: CPT | Performed by: INTERNAL MEDICINE

## 2020-01-15 RX ORDER — MELATONIN
325
Status: DISCONTINUED | OUTPATIENT
Start: 2020-01-15 | End: 2020-01-17

## 2020-01-15 RX ORDER — HYDROMORPHONE HYDROCHLORIDE 1 MG/ML
0.2 INJECTION, SOLUTION INTRAMUSCULAR; INTRAVENOUS; SUBCUTANEOUS EVERY 2 HOUR PRN
Status: DISCONTINUED | OUTPATIENT
Start: 2020-01-15 | End: 2020-01-30

## 2020-01-15 RX ORDER — POTASSIUM CHLORIDE 14.9 MG/ML
20 INJECTION INTRAVENOUS ONCE
Status: COMPLETED | OUTPATIENT
Start: 2020-01-15 | End: 2020-01-15

## 2020-01-15 RX ORDER — HYDROMORPHONE HYDROCHLORIDE 1 MG/ML
0.4 INJECTION, SOLUTION INTRAMUSCULAR; INTRAVENOUS; SUBCUTANEOUS EVERY 2 HOUR PRN
Status: DISCONTINUED | OUTPATIENT
Start: 2020-01-15 | End: 2020-01-30

## 2020-01-15 RX ORDER — FUROSEMIDE 10 MG/ML
20 INJECTION INTRAMUSCULAR; INTRAVENOUS ONCE
Status: COMPLETED | OUTPATIENT
Start: 2020-01-15 | End: 2020-01-15

## 2020-01-15 RX ORDER — MORPHINE SULFATE 2 MG/ML
0.5 INJECTION, SOLUTION INTRAMUSCULAR; INTRAVENOUS EVERY 4 HOURS PRN
Status: DISCONTINUED | OUTPATIENT
Start: 2020-01-15 | End: 2020-01-15

## 2020-01-15 RX ORDER — HYDROCODONE BITARTRATE AND ACETAMINOPHEN 5; 325 MG/1; MG/1
2 TABLET ORAL EVERY 6 HOURS PRN
Status: DISCONTINUED | OUTPATIENT
Start: 2020-01-15 | End: 2020-01-16

## 2020-01-15 RX ORDER — TRAMADOL HYDROCHLORIDE 50 MG/1
50 TABLET ORAL EVERY 6 HOURS PRN
Status: DISCONTINUED | OUTPATIENT
Start: 2020-01-15 | End: 2020-01-15

## 2020-01-15 RX ORDER — MORPHINE SULFATE 2 MG/ML
INJECTION, SOLUTION INTRAMUSCULAR; INTRAVENOUS
Status: DISPENSED
Start: 2020-01-15 | End: 2020-01-15

## 2020-01-15 RX ORDER — HYDROCODONE BITARTRATE AND ACETAMINOPHEN 5; 325 MG/1; MG/1
1 TABLET ORAL EVERY 6 HOURS PRN
Status: DISCONTINUED | OUTPATIENT
Start: 2020-01-15 | End: 2020-01-16

## 2020-01-15 RX ORDER — MORPHINE SULFATE 2 MG/ML
1 INJECTION, SOLUTION INTRAMUSCULAR; INTRAVENOUS EVERY 4 HOURS PRN
Status: DISCONTINUED | OUTPATIENT
Start: 2020-01-15 | End: 2020-01-15

## 2020-01-15 RX ORDER — MORPHINE SULFATE 2 MG/ML
2 INJECTION, SOLUTION INTRAMUSCULAR; INTRAVENOUS EVERY 4 HOURS PRN
Status: DISCONTINUED | OUTPATIENT
Start: 2020-01-15 | End: 2020-01-15

## 2020-01-15 RX ORDER — HYDROMORPHONE HYDROCHLORIDE 1 MG/ML
0.3 INJECTION, SOLUTION INTRAMUSCULAR; INTRAVENOUS; SUBCUTANEOUS EVERY 2 HOUR PRN
Status: DISCONTINUED | OUTPATIENT
Start: 2020-01-15 | End: 2020-01-30

## 2020-01-15 RX ORDER — ALPRAZOLAM 0.25 MG/1
0.25 TABLET ORAL 2 TIMES DAILY PRN
Status: DISCONTINUED | OUTPATIENT
Start: 2020-01-15 | End: 2020-01-16

## 2020-01-15 NOTE — OCCUPATIONAL THERAPY NOTE
OCCUPATIONAL THERAPY EVALUATION - INPATIENT     Room Number: 230/230-A  Evaluation Date: 1/15/2020  Type of Evaluation: Re-evaluation  Presenting Problem: s/p CABG x4 on 1/14, s/p LT THR 1/10    Physician Order: IP Consult to Occupational Therapy  Reason f Problems:    History of revision of total replacement of hip joint    Infection of prosthetic total hip joint [facility])      Past Medical History  Past Medical History:   Diagnosis Date   • Anesthesia complication     post anesthesia dementia   • Brain injury another person does the patient currently need…  -   Putting on and taking off regular lower body clothing?: A Lot  -   Bathing (including washing, rinsing, drying)?: A Lot  -   Toileting, which includes using toilet, bedpan or urinal? : A Lot  -   Putting

## 2020-01-15 NOTE — CM/SW NOTE
1/16 842am: MD order received regarding acute rehab is recommended. PMR consult is pending. SW will follow up with PMR recommendations.   ---------------------  1/15 1000am: MD order received regarding rehab.   Referral has been sent to [facility] in [facility]

## 2020-01-15 NOTE — PLAN OF CARE
Problem: Patient/Family Goals  Goal: Patient/Family Long Term Goal  Description  Patient's Long Term Goal: \"To be able to get back home and walking\"    Interventions:  - Patient will need to discharge to rehab when cleared for further strength and mobi comfort level or patient's stated pain goal  Description  INTERVENTIONS:  - Encourage pt to monitor pain and request assistance  - Assess pain using appropriate pain scale  - Administer analgesics based on type and severity of pain and evaluate response  - medications as ordered and appropriate  - Administer supportive blood products/factors as ordered and appropriate  Outcome: Progressing  Goal: Free from bleeding injury  Description  (Example usage: patient with low platelets)  INTERVENTIONS:  - Avoid intr for changes in mentation and behavior  - Position to facilitate oxygenation and minimize respiratory effort  - Oxygen supplementation based on oxygen saturation or ABGs  - Provide Smoking Cessation handout, if applicable  - Encourage broncho-pulmonary hygi signs and symptoms of hyperglycemia and hypoglycemia  - Administer ordered medications to maintain glucose within target range  - Assess barriers to adequate nutritional intake and initiate nutrition consult as needed  - Instruct patient on self management Musculoskeletal  Goal: Return mobility to safest level of function  Description  INTERVENTIONS:  - Assess patient stability and activity tolerance for standing, transferring, and ambulating with or without assistive devices  - Assist with transfers and amb

## 2020-01-15 NOTE — PROGRESS NOTES
[facility] ID PROGRESS NOTE    Ana Comment Patient Status:  Inpatient    1940 MRN [id number]   Location [facility] 2W/SW Attending Concetta Boatengissa Day # 5 PCP No primary care provider on file.      Subjective:  Awake, s/p 3V C recently. In Jan 2019 had a hip aspiration and this showed staph epi and pt was seen by another ID MD. At that time, no surgery done and pt treated with po bactrim. ON this occasion, admitted by Dr Anahi Miles for 1 stage revision and cultures and iv abx.  ID as

## 2020-01-15 NOTE — PROGRESS NOTES
[facility]    Progress Note    Jorge Sena Patient Status:  Inpatient    1940 MRN [id number]   Location [facility] 2W/SW Attending Luda Enamorado DO   Hosp Day # 5 PCP No primary care provider on file.      Subject IABP site CDI   Abdomen: Soft & obese, NT/ND, BS+x4, +flatus, no BM   Extremities: Warm, dry, no LE edema bilat  Pulses: Right 2+ DP/ left 1+ DP  Skin: sternotomy incision drsg: CDI w/TPW intact/right ACE wrap CDI   Neurological:  AAOx3, moves extremities anticipate rehab as previously planned after recent left hip surgery. Will ask SW to assist with planning. Addendum @ 1130: Wife and daughter arrived at bedside. Updated on plan of care. They stated pt.  Became nauseous after Ultram given and did not

## 2020-01-15 NOTE — PLAN OF CARE
Problem: Diabetes/Glucose Control  Goal: Glucose maintained within prescribed range  Description  INTERVENTIONS:  - Monitor Blood Glucose as ordered  - Assess for signs and symptoms of hyperglycemia and hypoglycemia  - Administer ordered medications to m migrated. Rechecked position per repeat xray and Dr Virgene Hashimoto advanced catheter. Repeat xray to verify placement done. Dr Virgene Hashimoto then placed another set of sutures to maintain placement. Carlos procedure well.    Goal: Absence of cardiac arrhythmias or at baseline  D adequate nutritional intake and initiate nutrition consult as needed  - Instruct patient on self management of diabetes  1/14/2020 1837 by Lupe Adler, RN  Note:   On insulin drip s/p cabg  1/14/2020 1748 by Lupe Adler, RN  Outcome: Progressing  N

## 2020-01-15 NOTE — PROGRESS NOTES
[place] [facility]    Progress Note    Eda Castillo Patient Status:  Inpatient    1940 MRN [id number]   Location [facility] 2W/SW Attending Sary Lo,    Hosp Day # 5 PCP No primary care provider on file.         Subj 1/10/2020     H/o   Infection of prosthetic total hip joint  ID and ortho following   IV Vancomycin         4- ZEYAD   Tolerated empiric Auto CPAP      5- prior h/o traumatic subdural hematoma 2018  stable      6- anemia   Post-op / hip revision  Transfuse t Dictated by (CST): Andrew Ma MD on 1/13/2020 at 21:05     Approved by (CST): Andrew Ma MD on 1/13/2020 at 21:12          Xr Chest Ap Portable  (cpt=71045)    Result Date: 1/15/2020  CONCLUSION:  nterval extubation and removal of enteric tube. --------------------------              Mayco Maldonado.  Evert Donovan MD  1/15/2020

## 2020-01-15 NOTE — SLP NOTE
SLP orders received and acknowledged. Chart reviewed. CIERRA Fuentes @[id number] reports patient not appropriate at this time. SLP to follow up this afternoon and/or as schedule permits or as patient appropriate. Thank you. Angeles Velazquez

## 2020-01-15 NOTE — RESPIRATORY THERAPY NOTE
Pt received on vent support SIMV 12R/520Vt/+5/PS10/40%. 2130:Weaning parameters attempted successfully, and pt placed on CPAP/PS 5/5 40%. ABG to follow 30mins after. Notified CIERRA Lee of ABG results and pt extubated @ 97 70 84 and placed on 4LNC.  Pt neha

## 2020-01-15 NOTE — PHYSICAL THERAPY NOTE
PHYSICAL THERAPY RE-EVALUATION - INPATIENT     Room Number: 230/230-A  Evaluation Date: 1/15/2020  Type of Evaluation: Re-evaluation   Physician Order: PT Eval and Treat    Presenting Problem: Left total hip arthroplasty revision  Reason for Therapy: Estefany complicated medical hx, s/p L DEVIN and CABG this admission. Pt with need intensive inpatient rehab stay to address current deficits and maximize patient independence. PT recommendation updated to ACUTE REHAB at d/c.      Patient will benefit from continued I None    Prior Level of Pe [place]: per chart, pt was MOD IND with ADLs using rolling walker for mobility, drives. SUBJECTIVE  \"Am I ok? \"    PHYSICAL THERAPY EXAMINATION     OBJECTIVE  Precautions: DEVIN - posterior;Sternal;Cardiac;Chest tube  Fall Ri Score (AM-PAC Scale): 28.58   CMS Modifier (G-Code): CM    FUNCTIONAL ABILITY STATUS  Gait Assessment   Gait Assistance: Not tested  Distance (ft): 0  Assistive Device: Other (Comment)(/)  Pattern: Shuffle;L Decreased stance time  Stoop/Curb Assistance: No

## 2020-01-15 NOTE — SLP NOTE
ADULT SWALLOWING EVALUATION    ASSESSMENT    ASSESSMENT/OVERALL IMPRESSION:  SLP BSSE orders received and acknowledged. A swallow evaluation warranted as pt NPO until evaluated by SLP. MD order indicates \"High risk for aspiration, post op. \" Pt with clear Recommendations: One pill at a time  Treatment Plan/Recommendations: Aspiration precautions  Discharge Recommendations/Plan: Undetermined    HISTORY   MEDICAL HISTORY  Reason for Referral: R/O aspiration    Problem List  Active Problems:    History of revi Limits  (Please note: Silent aspiration cannot be evaluated clinically.  Videofluoroscopic Swallow Study is required to rule-out silent aspiration.)    Esophageal Phase of Swallow: No complaints consistent with possible esophageal involvement      GOALS  Go

## 2020-01-16 ENCOUNTER — APPOINTMENT (OUTPATIENT)
Dept: GENERAL RADIOLOGY | Facility: HOSPITAL | Age: 80
DRG: 003 | End: 2020-01-16
Attending: HOSPITALIST
Payer: MEDICARE

## 2020-01-16 ENCOUNTER — APPOINTMENT (OUTPATIENT)
Dept: PICC SERVICES | Facility: HOSPITAL | Age: 80
DRG: 003 | End: 2020-01-16
Attending: CLINICAL NURSE SPECIALIST
Payer: MEDICARE

## 2020-01-16 ENCOUNTER — APPOINTMENT (OUTPATIENT)
Dept: GENERAL RADIOLOGY | Facility: HOSPITAL | Age: 80
DRG: 003 | End: 2020-01-16
Attending: INTERNAL MEDICINE
Payer: MEDICARE

## 2020-01-16 PROBLEM — I25.110 CORONARY ARTERY DISEASE INVOLVING NATIVE CORONARY ARTERY OF NATIVE HEART WITH UNSTABLE ANGINA PECTORIS (HCC): Status: ACTIVE | Noted: 2020-01-16

## 2020-01-16 PROBLEM — E78.00 PURE HYPERCHOLESTEROLEMIA: Status: ACTIVE | Noted: 2020-01-16

## 2020-01-16 LAB
ALBUMIN SERPL-MCNC: 2.1 G/DL (ref 3.4–5)
ALP LIVER SERPL-CCNC: 70 U/L (ref 45–117)
ALT SERPL-CCNC: 7 U/L (ref 16–61)
ANION GAP SERPL CALC-SCNC: 12 MMOL/L (ref 0–18)
ANION GAP SERPL CALC-SCNC: 4 MMOL/L (ref 0–18)
APTT PPP: 37.1 SECONDS (ref 23.2–35.3)
AST SERPL-CCNC: 28 U/L (ref 15–37)
BASE EXCESS BLD CALC-SCNC: -1.1 MMOL/L (ref ?–2)
BASE EXCESS BLD CALC-SCNC: -5.3 MMOL/L (ref ?–2)
BASOPHILS # BLD: 0 X10(3) UL (ref 0–0.2)
BASOPHILS NFR BLD: 0 %
BILIRUB DIRECT SERPL-MCNC: 0.1 MG/DL (ref 0–0.2)
BILIRUB SERPL-MCNC: 0.4 MG/DL (ref 0.1–2)
BUN BLD-MCNC: 26 MG/DL (ref 7–18)
BUN BLD-MCNC: 34 MG/DL (ref 7–18)
BUN/CREAT SERPL: 16.7 (ref 10–20)
BUN/CREAT SERPL: 18.3 (ref 10–20)
CA-I BLD-SCNC: 1.03 MMOL/L (ref 0.95–1.32)
CALCIUM BLD-MCNC: 7.1 MG/DL (ref 8.5–10.1)
CALCIUM BLD-MCNC: 7.9 MG/DL (ref 8.5–10.1)
CHLORIDE SERPL-SCNC: 103 MMOL/L (ref 98–112)
CHLORIDE SERPL-SCNC: 104 MMOL/L (ref 98–112)
CO2 SERPL-SCNC: 23 MMOL/L (ref 21–32)
CO2 SERPL-SCNC: 30 MMOL/L (ref 21–32)
COHGB MFR BLD: 1.6 % (ref 0–1.5)
CREAT BLD-MCNC: 1.56 MG/DL (ref 0.7–1.3)
CREAT BLD-MCNC: 1.86 MG/DL (ref 0.7–1.3)
DEPRECATED RDW RBC AUTO: 58.8 FL (ref 35.1–46.3)
EOSINOPHIL # BLD: 0 X10(3) UL (ref 0–0.7)
EOSINOPHIL NFR BLD: 0 %
ERYTHROCYTE [DISTWIDTH] IN BLOOD BY AUTOMATED COUNT: 17.6 % (ref 11–15)
GLUCOSE BLD-MCNC: 114 MG/DL (ref 70–99)
GLUCOSE BLD-MCNC: 215 MG/DL (ref 70–99)
GLUCOSE BLDC GLUCOMTR-MCNC: 101 MG/DL (ref 70–99)
GLUCOSE BLDC GLUCOMTR-MCNC: 118 MG/DL (ref 70–99)
GLUCOSE BLDC GLUCOMTR-MCNC: 122 MG/DL (ref 70–99)
GLUCOSE BLDC GLUCOMTR-MCNC: 133 MG/DL (ref 70–99)
GLUCOSE BLDC GLUCOMTR-MCNC: 148 MG/DL (ref 70–99)
GLUCOSE BLDC GLUCOMTR-MCNC: 202 MG/DL (ref 70–99)
HAV IGM SER QL: 2.7 MG/DL (ref 1.6–2.6)
HCO3 BLDA-SCNC: 20.7 MEQ/L (ref 21–27)
HCO3 BLDA-SCNC: 24 MEQ/L (ref 21–27)
HCT VFR BLD AUTO: 23 % (ref 39–53)
HGB BLD-MCNC: 7.2 G/DL (ref 13–17.5)
HGB BLD-MCNC: 9.8 G/DL (ref 13.5–17.5)
LACTATE SERPL-SCNC: 6.1 MMOL/L (ref 0.4–2)
LACTIC ACID (BLOOD GAS): 7.4 MMOL/L (ref 0.5–2.2)
LYMPHOCYTES NFR BLD: 1.76 X10(3) UL (ref 1–4)
LYMPHOCYTES NFR BLD: 11 %
M PROTEIN MFR SERPL ELPH: 5.3 G/DL (ref 6.4–8.2)
MCH RBC QN AUTO: 28.9 PG (ref 26–34)
MCHC RBC AUTO-ENTMCNC: 31.3 G/DL (ref 31–37)
MCV RBC AUTO: 92.4 FL (ref 80–100)
METAMYELOCYTES # BLD: 0.32 X10(3) UL
METAMYELOCYTES NFR BLD: 2 %
METHGB MFR BLD: 0.1 % SAT (ref 0.4–1.5)
MONOCYTES # BLD: 1.76 X10(3) UL (ref 0.1–1)
MONOCYTES NFR BLD: 11 %
NEUTROPHILS # BLD AUTO: 11.99 X10 (3) UL (ref 1.5–7.7)
NEUTROPHILS NFR BLD: 63 %
NEUTS BAND NFR BLD: 13 %
NEUTS HYPERSEG # BLD: 12.16 X10(3) UL (ref 1.5–7.7)
NRBC BLD MANUAL-RTO: 2 %
O2 CT BLD-SCNC: 12.4 VOL% (ref 15–23)
O2 CT BLD-SCNC: 21.5 VOL% (ref 15–23)
O2/TOTAL GAS SETTING VFR VENT: 100 %
O2/TOTAL GAS SETTING VFR VENT: 100 %
OSMOLALITY SERPL CALC.SUM OF ELEC: 292 MOSM/KG (ref 275–295)
OSMOLALITY SERPL CALC.SUM OF ELEC: 300 MOSM/KG (ref 275–295)
PCO2 BLDA: 37 MM HG (ref 35–45)
PCO2 BLDA: 42 MM HG (ref 35–45)
PEEP SETTING VENT: 5 CM H2O
PEEP SETTING VENT: 5 CM H2O
PH BLDA: 7.34 [PH] (ref 7.35–7.45)
PH BLDA: 7.37 [PH] (ref 7.35–7.45)
PLATELET # BLD AUTO: 202 10(3)UL (ref 150–450)
PLATELET MORPHOLOGY: NORMAL
PO2 BLDA: 179 MM HG (ref 80–100)
PO2 BLDA: 60 MM HG (ref 80–100)
POTASSIUM (BLOOD GAS): 4.4 MMOL/L (ref 3.3–5.1)
POTASSIUM SERPL-SCNC: 4.1 MMOL/L (ref 3.5–5.1)
POTASSIUM SERPL-SCNC: 4.7 MMOL/L (ref 3.5–5.1)
PUNCTURE CHARGE: NO
PUNCTURE CHARGE: NO
RBC # BLD AUTO: 2.49 X10(6)UL (ref 3.8–5.8)
RESP RATE: 12 BPM
RESP RATE: 12 BPM
SAO2 % BLDA: 91.1 % (ref 94–100)
SAO2 % BLDA: 97.9 % (ref 94–100)
SODIUM (BLOOD GAS): 135 MMOL/L (ref 135–145)
SODIUM SERPL-SCNC: 138 MMOL/L (ref 136–145)
SODIUM SERPL-SCNC: 138 MMOL/L (ref 136–145)
SPECIMEN VOL 24H UR: 500 ML
SPECIMEN VOL 24H UR: 500 ML
TOTAL CELLS COUNTED: 100
TROPONIN I SERPL-MCNC: 5.09 NG/ML (ref ?–0.04)
WBC # BLD AUTO: 16 X10(3) UL (ref 4–11)

## 2020-01-16 PROCEDURE — 5A1955Z RESPIRATORY VENTILATION, GREATER THAN 96 CONSECUTIVE HOURS: ICD-10-PCS | Performed by: ANESTHESIOLOGY

## 2020-01-16 PROCEDURE — 99233 SBSQ HOSP IP/OBS HIGH 50: CPT | Performed by: INTERNAL MEDICINE

## 2020-01-16 PROCEDURE — 71045 X-RAY EXAM CHEST 1 VIEW: CPT | Performed by: INTERNAL MEDICINE

## 2020-01-16 PROCEDURE — 5A12012 PERFORMANCE OF CARDIAC OUTPUT, SINGLE, MANUAL: ICD-10-PCS | Performed by: HOSPITALIST

## 2020-01-16 PROCEDURE — 71045 X-RAY EXAM CHEST 1 VIEW: CPT | Performed by: HOSPITALIST

## 2020-01-16 PROCEDURE — 02HV33Z INSERTION OF INFUSION DEVICE INTO SUPERIOR VENA CAVA, PERCUTANEOUS APPROACH: ICD-10-PCS | Performed by: INTERNAL MEDICINE

## 2020-01-16 PROCEDURE — 0BH17EZ INSERTION OF ENDOTRACHEAL AIRWAY INTO TRACHEA, VIA NATURAL OR ARTIFICIAL OPENING: ICD-10-PCS | Performed by: ANESTHESIOLOGY

## 2020-01-16 RX ORDER — ALBUMIN, HUMAN INJ 5% 5 %
250 SOLUTION INTRAVENOUS ONCE
Status: COMPLETED | OUTPATIENT
Start: 2020-01-16 | End: 2020-01-16

## 2020-01-16 RX ORDER — HYDROCODONE BITARTRATE AND ACETAMINOPHEN 5; 325 MG/1; MG/1
1 TABLET ORAL EVERY 4 HOURS PRN
Status: DISCONTINUED | OUTPATIENT
Start: 2020-01-16 | End: 2020-02-04

## 2020-01-16 RX ORDER — HEPARIN SODIUM 10000 [USP'U]/100ML
500 INJECTION, SOLUTION INTRAVENOUS CONTINUOUS
Status: DISCONTINUED | OUTPATIENT
Start: 2020-01-16 | End: 2020-01-17

## 2020-01-16 RX ORDER — SODIUM CHLORIDE 0.9 % (FLUSH) 0.9 %
10 SYRINGE (ML) INJECTION AS NEEDED
Status: DISCONTINUED | OUTPATIENT
Start: 2020-01-16 | End: 2020-02-04

## 2020-01-16 RX ORDER — HEPARIN SODIUM 5000 [USP'U]/ML
5000 INJECTION, SOLUTION INTRAVENOUS; SUBCUTANEOUS EVERY 12 HOURS SCHEDULED
Status: DISCONTINUED | OUTPATIENT
Start: 2020-01-16 | End: 2020-01-16

## 2020-01-16 RX ORDER — BUMETANIDE 0.25 MG/ML
2 INJECTION, SOLUTION INTRAMUSCULAR; INTRAVENOUS ONCE
Status: COMPLETED | OUTPATIENT
Start: 2020-01-16 | End: 2020-01-16

## 2020-01-16 RX ORDER — HEPARIN SODIUM 10000 [USP'U]/100ML
INJECTION, SOLUTION INTRAVENOUS
Status: DISPENSED
Start: 2020-01-16 | End: 2020-01-17

## 2020-01-16 RX ORDER — SODIUM CHLORIDE 9 MG/ML
INJECTION, SOLUTION INTRAVENOUS CONTINUOUS
Status: DISCONTINUED | OUTPATIENT
Start: 2020-01-16 | End: 2020-02-04

## 2020-01-16 RX ORDER — POTASSIUM CHLORIDE 20 MEQ/1
40 TABLET, EXTENDED RELEASE ORAL ONCE
Status: COMPLETED | OUTPATIENT
Start: 2020-01-16 | End: 2020-01-16

## 2020-01-16 RX ORDER — LIDOCAINE HYDROCHLORIDE 10 MG/ML
0.5 INJECTION, SOLUTION INFILTRATION; PERINEURAL ONCE AS NEEDED
Status: ACTIVE | OUTPATIENT
Start: 2020-01-16 | End: 2020-01-16

## 2020-01-16 RX ORDER — IPRATROPIUM BROMIDE AND ALBUTEROL SULFATE 2.5; .5 MG/3ML; MG/3ML
3 SOLUTION RESPIRATORY (INHALATION) EVERY 6 HOURS PRN
Status: DISCONTINUED | OUTPATIENT
Start: 2020-01-16 | End: 2020-02-04

## 2020-01-16 RX ORDER — ALPRAZOLAM 0.25 MG/1
0.25 TABLET ORAL 3 TIMES DAILY PRN
Status: DISCONTINUED | OUTPATIENT
Start: 2020-01-16 | End: 2020-02-04

## 2020-01-16 NOTE — PROGRESS NOTES
Orthopaedic Note    S:  POD #6 s/p Left hip revision including one stage exchange for infection   Up in chair. Denies any significant pain to left hip. O: Alert and appropriate, NAD. Left hip drsg is clean/dry/intact. LLE NVID.   Palpable DP pulse pre

## 2020-01-16 NOTE — PROGRESS NOTES
Vascular Access Note    Vascular Access Screening:   Allergies to Lidocaine: no  Allergies to Latex: no  Presence of Pacemaker/Defibrillator: No  Mastectomy with Lymph Node Dissection: No  AV Fistula / AV Graft: No  Dialysis Catheter: No  Central Line: Rogelio

## 2020-01-16 NOTE — PLAN OF CARE
POD #1 CABG x3. AOx4, moving all extremities, had SLP eval and cleared for regular consistency food/thin liquids. Poor appetite at this time.    Pt became anxious this afternoon and family escalates anxiety - provided guidance on relaxation techniques and c as we care for you?  \"per daughter, we have a caregiver for him from 9pm to 9am\"  - Provide timely, complete, and accurate information to patient/family  - Incorporate patient and family knowledge, values, beliefs, and cultural backgrounds into the planni pt/family on patient safety including physical limitations  - Instruct pt to call for assistance with activity based on assessment  - Modify environment to reduce risk of injury  - Provide assistive devices as appropriate  - Consider OT/PT consult to coleman Monitor lab trends  - Patient is to report abnormal signs of bleeding to staff  - Avoid use of toothpicks and dental floss  - Use electric shaver for shaving  - Use soft bristle tooth brush  - Limit straining and forceful nose blowing  Outcome: Progressing Manage/alleviate anxiety  - Monitor for signs/symptoms of CO2 retention  Outcome: Progressing     Problem: Cardiovascular  Goal: Maintains optimal cardiac output and hemodynamic stability  Description  INTERVENTIONS:  - Monitor vital signs and trends  - Ad electrolyte replacement as ordered  - Monitor response to electrolyte replacements, including rhythm and repeat lab results as appropriate  - Fluid restriction as ordered  - Instruct patient on fluid and nutrition restrictions as appropriate  Outcome: Prog Progressing  Goal: Maintain proper alignment of affected body part  Description  INTERVENTIONS:  - Support and protect limb and body alignment per provider's orders  - Instruct and reinforce with patient and family use of appropriate assistive device and p

## 2020-01-16 NOTE — SLP NOTE
SLP attempt for f/u on 1/16. Per RN Maryellen Vera, pt is eating and drinking well with no overt s/s of aspiration.  Pt's son asked speech to defer to a later time/date as pt requesting to rest. SLP to f/u when pt able to fully participate and alert/appropriate and

## 2020-01-16 NOTE — PROGRESS NOTES
[facility]    Progress Note    Lamount Rushing Patient Status:  Inpatient    1940 MRN [id number]   Location [facility] 2W/SW Attending Colletta Silos,    Hosp Day # 6 PCP No primary care provider on file.        Subject Nightly   • tamsulosin HCl  0.8 mg Oral Nightly       Continuous Infusions:   • sodium chloride     • DOBUTamine 2 mcg/kg/min (01/16/20 0600)   • Nitroglycerin in D5W     • norepinephrine Stopped (01/15/20 0400)   • nitroprusside (NIPRIDE) 50 mg in D5W inf 2mcgs will discuss with CV surgeon plan for the dobutamine  Will keep ICU status today   Carotid US 1/13/20 PETROS stenosis >70% will discuss plan for intervention at follow up visit with CV surgeons office; family is aware  At risk for post-op afib on amio Maxime Vitale MD on 1/15/2020 at 8:25     Approved by (CST): Caldwell Cushing, MD on 1/15/2020 at 8:28          Xr Chest Ap Portable  (cpt=71045)    Result Date: 1/14/2020  CONCLUSION:  1.  IABP clip is now noted at the level of T5-T6 in the more proximal descen Enrique Francis, NP  1/16/2020

## 2020-01-16 NOTE — PROGRESS NOTES
Pulmonary/Critical Care Follow Up Note    HPI:   Roxanne Donato is a 78year old male with No chief complaint on file. PCP No primary care provider on file.   Admission Attending Kathy Burnett 15 Day #6    Denies pain  Denies sob    So Human (ALBUMINAR) 5 % solution 250 mL, 250 mL, Intravenous, Once PRN  •  DOBUTamine in D5W (DOBUTREX) 500 mg/250 ml infusion, 2.5-10 mcg/kg/min (Dosing Weight), Intravenous, Continuous PRN  •  nitroGLYCERIN infusion 50mg in D5W 250ml, 5-300 mcg/min, [place] Bisulfate (PLAVIX) tab 75 mg, 75 mg, Oral, Daily  •  aspirin EC tab 81 mg, 81 mg, Oral, Daily  •  amiodarone HCl (PACERONE) tab 200 mg, 200 mg, Oral, TID CC  •  [] Amiodarone HCl (CORDARONE) 450 mg in dextrose 5 % 250 mL infusion, 1 mg/min, Intraven pulse 78, temperature 97.1 °F (36.2 °C), temperature source Temporal, resp. rate 20, height 5' 6\" (1.676 m), weight 231 lb (104.8 kg), SpO2 99 %.     Intake/Output Summary (Last 24 hours) at 1/16/2020 1155  Last data filed at 1/16/2020 0800  Gross per 24 h

## 2020-01-16 NOTE — OCCUPATIONAL THERAPY NOTE
OCCUPATIONAL THERAPY TREATMENT NOTE - INPATIENT    Room Number: 375/146-O         Presenting Problem: s/p CABG x4 on 1/14, s/p LT THR 1/10     Problem List  Active Problems:    History of revision of total replacement of hip joint    Infection of prostheti ASSESSMENT  Rating: Unable to rate  Location: (sternum)  Management Techniques: Relaxation     ACTIVITY TOLERANCE  Pulse: 77    ACTIVITIES OF DAILY LIVING ASSESSMENT  AM-PAC ‘6-Clicks’ Inpatient Daily Activity Short Form  How much help from another person

## 2020-01-16 NOTE — PHYSICAL THERAPY NOTE
PHYSICAL THERAPY TREATMENT NOTE - INPATIENT     Room Number: 537/989-F       Presenting Problem: L DEVIN revision 1/10, MI s/p CABG 1/14    Problem List  Active Problems:    History of revision of total replacement of hip joint    Infection of prosthetic tot supports that patients with this level of impairment may benefit from rehab stay. Pt progressing towards PT goals, initiated transfer training today.  Continues to demonstrate generalized weakness, balance deficits, decreased activity tolerance, chest and L from another person does the patient currently need. ..   -   Moving to and from a bed to a chair (including a wheelchair)?: A Lot   -   Need to walk in hospital room?: A Lot   -   Climbing 3-5 steps with a railing?: Total     AM-PAC Score:  Raw Score: 11 proper healing   Goal #5   Current Status In progress- verbal cues and assist to maintain during mobility   Goal #6 Patient to demonstrate independence with home activity/exercise instructions provided to patient in preparation for discharge.    Goal #6  Cu

## 2020-01-16 NOTE — CONSULTS
[address] Patient Status:  Inpatient    1940 MRN [id number]   Location [facility] 2W/SW Attending Mavis Hanks, [address] [place] [address] Day # 6 PCP No primary care provider on file.      Patient Identification  Jennifer Berrios OM, SVG TO THE DIAG, RIGHT LEG ENDOSCOPIC GREATER SAPHENOUS VEIN HARVEST, INTRAOPERATIVE TRANSESOPHAGEAL ECHOCARDIOGRAM, INSERTION OF TEMPORARY ATRIAL AND VENTRICULAR PACING WIRES, STERNAL PLATING by . In the ICU. Has a L chest tube.   On Dobu injection 20 mg, 20 mg, Intravenous, Once  [COMPLETED] potassium chloride IVPB premix 20 mEq, 20 mEq, Intravenous, Once  [] morphINE sulfate (PF) 2 MG/ML injection, , ,   HYDROmorphone HCl (DILAUDID) 1 MG/ML injection 0.2 mg, 0.2 mg, Intravenous, Q2 hydroxide (MILK OF MAGNESIA) 400 MG/5ML suspension 30 mL, 30 mL, Oral, Daily PRN  bisacodyl (DULCOLAX) rectal suppository 10 mg, 10 mg, Rectal, Daily PRN  FLEET ENEMA (FLEET) 7-19 GM/118ML enema 133 mL, 1 enema, Rectal, Once PRN  ondansetron HCl (ZOFRAN) i MG/ML oral solution 125 mg, 125 mg, Oral, BID  [COMPLETED] Midazolam HCl (VERSED) 2 MG/2ML injection, , ,   [COMPLETED] fentaNYL citrate (SUBLIMAZE) 0.05 MG/ML injection, , ,   [COMPLETED] lidocaine (PF) (XYLOCAINE) 2 % injection, , ,   [COMPLETED] Heparin mg, Oral, Once  [] Heparin Sodium (Porcine) 5000 UNIT/ML injection, , ,   [COMPLETED] aspirin chewable tab 243 mg, 243 mg, Oral, Once  [MAR Hold] carvedilol (COREG) tab 25 mg, 25 mg, Oral, BID with meals  [] 0.9% NaCl infusion, , Intravenous, mg, Oral, Nightly  [COMPLETED] sodium chloride 0.9% IV bolus 500 mL, 500 mL, Intravenous, Once        Social History    Tobacco Use      Smoking status: Former Smoker        Packs/day: 0.50        Years: 5.00        Pack years: 2.5        Quit date: 1/6/20 pedal pulses good. No cyanosis in all extremities. Abdomen:   No tenderness guarding or rigidity. Liver and spleen are not enlarged. Bowel sounds present. Musculoskeletal:     Right Upper Extremity:  Strength is 3-4. ROM WNL.    Left U 24 hr rehab nursing also beneficial for medication management, pressure sore prevention, bowel and bladder management, skin management.      Physiatric medical oversight to monitor kidney function, cardiac function, pulmonary status, neurologic sta

## 2020-01-16 NOTE — PROGRESS NOTES
[place] [facility]    Cardiology Progress Note  [facility]    Jayla Levin Patient Status:  Inpatient    1940 MRN [id number]   Location [facility] 2W/SW Attending Jaison Cassidy Day # 6 PCP [address] -- [address] --    I.V.  4369  1820.1  --    I.V. 2369 710 --    Volume (mL) Insulin -- 16.5 --    Volume (mL) Dobutamine -- 101.8 --    Volume (mL) Amiodarone -- 257.8 --    Volume (mL) (dextrose 5 % and 0.9 % NaCl infusion) -- 734 --    Volume (mL) (lactat vancomycin  15 mg/kg Intravenous Q24H   • DULoxetine HCl  30 mg Oral Daily   • OCUVITE-LUTEIN  1 tablet Oral Daily   • [MAR Hold] losartan  100 mg Oral Daily   • [MAR Hold] acetaminophen  650 mg Oral Once   • [MAR Hold] carvedilol  25 mg Oral BID with meal Labs   Lab 01/14/20  0513 01/14/20  0939 01/15/20  0514 01/15/20  1459   RBC 2.99* 3.25* 3.46* 3.25*   HGB 8.3* 9.4* 9.8* 9.5*   HCT 26.4* 28.9* 30.3* 29.4*   MCV 88.3 88.9 87.6 90.5   MCH 27.8 28.9 28.3 29.2   MCHC 31.4 32.5 32.3 32.3   RDW 17.4* 17.2* 17 than left basilar pleural effusions with compressive atelectasis.     Dictated by (CST): Timothy Hess MD on 1/14/2020 at 13:06     Approved by (CST): Timothy Hess MD on 1/14/2020 at 13:11          Ekg 12-lead    Result Date: 1/15/2020  ECG Report  Inter

## 2020-01-16 NOTE — PROGRESS NOTES
[facility] dosing service    Follow-up Pharmacokinetic Consult for Vancomycin Dosing     Cholo Lee is a 78year old male who is being treated for chronic L THR . Patient is on day 5 of Vancomycin and is currently receiving 1.5 gm IV Q 24 hours. to 3rd dose. Goal trough level 15-20 ug/mL. 3.  Pharmacy will need Scr daily while on Vancomycin to assess renal function. 4.  Pharmacy will follow and adjust as necessary. We appreciate the opportunity to assist in his care.     ISHA Abraham

## 2020-01-16 NOTE — CM/SW NOTE
Care Management/Discharge Planning:    PT/OT recommending Acute Rehab. PMR consult is pending. Met with patient, wife and daughter to discuss potential referrals.   Provided list with quality data provided and they are interested in facilities closest to

## 2020-01-17 ENCOUNTER — APPOINTMENT (OUTPATIENT)
Dept: ULTRASOUND IMAGING | Facility: HOSPITAL | Age: 80
DRG: 003 | End: 2020-01-17
Attending: INTERNAL MEDICINE
Payer: MEDICARE

## 2020-01-17 ENCOUNTER — APPOINTMENT (OUTPATIENT)
Dept: ULTRASOUND IMAGING | Facility: HOSPITAL | Age: 80
DRG: 003 | End: 2020-01-17
Attending: CLINICAL NURSE SPECIALIST
Payer: MEDICARE

## 2020-01-17 ENCOUNTER — APPOINTMENT (OUTPATIENT)
Dept: CV DIAGNOSTICS | Facility: HOSPITAL | Age: 80
DRG: 003 | End: 2020-01-17
Attending: PHYSICIAN ASSISTANT
Payer: MEDICARE

## 2020-01-17 ENCOUNTER — APPOINTMENT (OUTPATIENT)
Dept: GENERAL RADIOLOGY | Facility: HOSPITAL | Age: 80
DRG: 003 | End: 2020-01-17
Attending: INTERNAL MEDICINE
Payer: MEDICARE

## 2020-01-17 LAB
ANION GAP SERPL CALC-SCNC: 7 MMOL/L (ref 0–18)
ANTIBODY SCREEN: NEGATIVE
APTT PPP: 46.1 SECONDS (ref 23.2–35.3)
BASE EXCESS BLD CALC-SCNC: 4.2 MMOL/L (ref ?–2)
BASOPHILS # BLD AUTO: 0.01 X10(3) UL (ref 0–0.2)
BASOPHILS NFR BLD AUTO: 0.1 %
BILIRUB UR QL: NEGATIVE
BLOOD TYPE BARCODE: 5100
BUN BLD-MCNC: 37 MG/DL (ref 7–18)
BUN/CREAT SERPL: 19.8 (ref 10–20)
CALCIUM BLD-MCNC: 7.3 MG/DL (ref 8.5–10.1)
CHLORIDE SERPL-SCNC: 104 MMOL/L (ref 98–112)
CO2 SERPL-SCNC: 28 MMOL/L (ref 21–32)
COLOR UR: YELLOW
CREAT BLD-MCNC: 1.87 MG/DL (ref 0.7–1.3)
DEPRECATED RDW RBC AUTO: 56.3 FL (ref 35.1–46.3)
EOSINOPHIL # BLD AUTO: 0.03 X10(3) UL (ref 0–0.7)
EOSINOPHIL NFR BLD AUTO: 0.2 %
ERYTHROCYTE [DISTWIDTH] IN BLOOD BY AUTOMATED COUNT: 17.6 % (ref 11–15)
GLUCOSE BLD-MCNC: 148 MG/DL (ref 70–99)
GLUCOSE BLDC GLUCOMTR-MCNC: 143 MG/DL (ref 70–99)
GLUCOSE BLDC GLUCOMTR-MCNC: 143 MG/DL (ref 70–99)
GLUCOSE BLDC GLUCOMTR-MCNC: 147 MG/DL (ref 70–99)
GLUCOSE UR-MCNC: NEGATIVE MG/DL
HAV IGM SER QL: 2.6 MG/DL (ref 1.6–2.6)
HCO3 BLDA-SCNC: 28.2 MEQ/L (ref 21–27)
HCT VFR BLD AUTO: 21 % (ref 39–53)
HGB BLD-MCNC: 6.6 G/DL (ref 13–17.5)
HGB BLD-MCNC: 7 G/DL (ref 13–17.5)
HYALINE CASTS #/AREA URNS AUTO: 1 /LPF
IMM GRANULOCYTES # BLD AUTO: 0.34 X10(3) UL (ref 0–1)
IMM GRANULOCYTES NFR BLD: 2.6 %
KETONES UR-MCNC: NEGATIVE MG/DL
LACTATE SERPL-SCNC: 1.7 MMOL/L (ref 0.4–2)
LEUKOCYTE ESTERASE UR QL STRIP.AUTO: NEGATIVE
LYMPHOCYTES # BLD AUTO: 1.35 X10(3) UL (ref 1–4)
LYMPHOCYTES NFR BLD AUTO: 10.2 %
MCH RBC QN AUTO: 27.8 PG (ref 26–34)
MCHC RBC AUTO-ENTMCNC: 31.4 G/DL (ref 31–37)
MCV RBC AUTO: 88.6 FL (ref 80–100)
MONOCYTES # BLD AUTO: 1.41 X10(3) UL (ref 0.1–1)
MONOCYTES NFR BLD AUTO: 10.7 %
NEUTROPHILS # BLD AUTO: 10.05 X10 (3) UL (ref 1.5–7.7)
NEUTROPHILS # BLD AUTO: 10.05 X10(3) UL (ref 1.5–7.7)
NEUTROPHILS NFR BLD AUTO: 76.2 %
NITRITE UR QL STRIP.AUTO: NEGATIVE
O2/TOTAL GAS SETTING VFR VENT: 80 %
OSMOLALITY SERPL CALC.SUM OF ELEC: 299 MOSM/KG (ref 275–295)
PCO2 BLDA: 40 MM HG (ref 35–45)
PEEP SETTING VENT: 5 CM H2O
PH BLDA: 7.46 [PH] (ref 7.35–7.45)
PH UR: 5 [PH] (ref 5–8)
PLATELET # BLD AUTO: 196 10(3)UL (ref 150–450)
PO2 BLDA: 117 MM HG (ref 80–100)
POTASSIUM SERPL-SCNC: 4.3 MMOL/L (ref 3.5–5.1)
PROT UR-MCNC: NEGATIVE MG/DL
PUNCTURE CHARGE: NO
RBC # BLD AUTO: 2.37 X10(6)UL (ref 3.8–5.8)
RBC #/AREA URNS AUTO: 24 /HPF
RESP RATE: 12 BPM
RH BLOOD TYPE: POSITIVE
SODIUM SERPL-SCNC: 139 MMOL/L (ref 136–145)
SP GR UR STRIP: 1.01 (ref 1–1.03)
SPECIMEN VOL 24H UR: 500 ML
TROPONIN I SERPL-MCNC: 4.03 NG/ML (ref ?–0.04)
UROBILINOGEN UR STRIP-ACNC: <2
VANCOMYCIN TROUGH SERPL-MCNC: 23.2 UG/ML (ref 10–20)
WBC # BLD AUTO: 13.2 X10(3) UL (ref 4–11)
WBC #/AREA URNS AUTO: 5 /HPF

## 2020-01-17 PROCEDURE — 99291 CRITICAL CARE FIRST HOUR: CPT | Performed by: INTERNAL MEDICINE

## 2020-01-17 PROCEDURE — 76770 US EXAM ABDO BACK WALL COMP: CPT | Performed by: INTERNAL MEDICINE

## 2020-01-17 PROCEDURE — 93306 TTE W/DOPPLER COMPLETE: CPT | Performed by: PHYSICIAN ASSISTANT

## 2020-01-17 PROCEDURE — 71045 X-RAY EXAM CHEST 1 VIEW: CPT | Performed by: INTERNAL MEDICINE

## 2020-01-17 PROCEDURE — 93970 EXTREMITY STUDY: CPT | Performed by: CLINICAL NURSE SPECIALIST

## 2020-01-17 RX ORDER — ALBUMIN, HUMAN INJ 5% 5 %
SOLUTION INTRAVENOUS
Status: COMPLETED
Start: 2020-01-17 | End: 2020-01-17

## 2020-01-17 RX ORDER — FERROUS SULFATE 300 MG/5ML
300 LIQUID (ML) ORAL 3 TIMES DAILY
Status: DISCONTINUED | OUTPATIENT
Start: 2020-01-17 | End: 2020-02-04

## 2020-01-17 RX ORDER — SODIUM CHLORIDE 9 MG/ML
INJECTION, SOLUTION INTRAVENOUS ONCE
Status: DISCONTINUED | OUTPATIENT
Start: 2020-01-17 | End: 2020-01-27

## 2020-01-17 RX ORDER — HEPARIN SODIUM 5000 [USP'U]/ML
5000 INJECTION, SOLUTION INTRAVENOUS; SUBCUTANEOUS EVERY 8 HOURS SCHEDULED
Status: DISCONTINUED | OUTPATIENT
Start: 2020-01-17 | End: 2020-01-17

## 2020-01-17 RX ORDER — BUMETANIDE 0.25 MG/ML
2 INJECTION, SOLUTION INTRAMUSCULAR; INTRAVENOUS ONCE
Status: DISCONTINUED | OUTPATIENT
Start: 2020-01-17 | End: 2020-01-19 | Stop reason: ALTCHOICE

## 2020-01-17 RX ORDER — HEPARIN SODIUM 10000 [USP'U]/100ML
1000 INJECTION, SOLUTION INTRAVENOUS CONTINUOUS
Status: DISCONTINUED | OUTPATIENT
Start: 2020-01-17 | End: 2020-02-01

## 2020-01-17 RX ORDER — HEPARIN SODIUM 5000 [USP'U]/ML
5000 INJECTION, SOLUTION INTRAVENOUS; SUBCUTANEOUS EVERY 12 HOURS
Status: DISCONTINUED | OUTPATIENT
Start: 2020-01-17 | End: 2020-01-17

## 2020-01-17 NOTE — PROGRESS NOTES
Patient awake, sedated but neurologically intact. Still requiring some hemodynamic support for blood pressure. Echo at bedside this morning shows excellent left ventricular function with ejection fraction 70% but some underfilling of the left ventricle.

## 2020-01-17 NOTE — RESPIRATORY THERAPY NOTE
Received pt. on ventilator  settings- V/C A/C 12/500/100%/5+  ETT size- 7.5, 23 at the lip  Suctioned pt. as needed  Abg obtained at 23:40    Changes made- weaned pt's Fi02 from 100% to 80%, RN notified. Pt. Adjusted to changes well.     Current ventilator

## 2020-01-17 NOTE — DIETARY NOTE
ADULT NUTRITION INITIAL ASSESSMENT    Pt is at high nutrition risk. Pt does not meet malnutrition criteria. RECOMMENDATIONS TO MD:  See Nutrition Intervention for EN rx. EN order set in place.       NUTRITION DIAGNOSIS/PROBLEM:  Inadequate protein e ([facility] 75.) 03/2018    subdural hematoma   • Depression    • Hearing impairment    • High blood pressure        ANTHROPOMETRICS:  HT: 167.6 cm (5' 6\")  WT: 105.9 kg (233 lb 7.5 oz) this is above UBW d/t fluid gains.  MD notes plan diuresis before try to wean and piperacillin-tazobactam  4.5 g Intravenous Q8H   • ferrous sulfate  300 mg Oral TID   • Insulin Regular Human  1-5 Units Subcutaneous 4 times per day   • pantoprazole (PROTONIX) IV push  40 mg Intravenous Daily   • docusate sodium  100 mg Oral BID   • Chlo 01/15/20  0514 01/16/20  0504 01/16/20  2222 01/17/20  0438   * 114* 215* 148*   BUN 21* 26* 34* 37*   CREATSERUM 1.47* 1.56* 1.86* 1.87*   CA 7.9* 7.9* 7.1* 7.3*   MG 3.0*  --  2.7* 2.6    138 138 139   K 4.0 4.1 4.7 4.3    104 103 104

## 2020-01-17 NOTE — ANESTHESIA PROCEDURE NOTES
Airway  Date/Time: 1/16/2020 10:00 PM  Urgency: emergent    Airway not difficult    General Information and Staff    Patient location during procedure: ICU  Anesthesiologist: Odin Paul MD  Performed: anesthesiologist     Consent for Airway (if perfo

## 2020-01-17 NOTE — PROGRESS NOTES
[facility] ID PROGRESS NOTE    Tilford Anna Patient Status:  Inpatient    1940 MRN [id number]   Location [facility] 2W/SW Attending Concetta Boateng Day # 7 PCP No primary care provider on file. Subjective:   Intubated last revised due to dislocation in 2016. Per pt, since that time has had pain on and off, but more recently.  In Jan 2019 had a hip aspiration and this showed staph epi and pt was seen by another ID MD. At that time, no surgery done and pt treated with po bactri

## 2020-01-17 NOTE — PROGRESS NOTES
[facility]    Cardiology - [facility]-S  Progress Note    Lamount Rushing Patient Status:  Inpatient    1940 MRN [id number]   Location [facility] 2W/SW Attending Nilda Wade Day # 7 PCP No primary care provider systolic pressure (S, est): 67mm Hg. Consistent     with severe pulmonary hypertension. 3. Right atrium: The atrium was normal in size. 4. Tricuspid valve: Mild regurgitation. 5. Pulmonic valve: Mild regurgitation.       Xr Chest Ap Portable  (cpt=12964) Oral Daily   • OCUVITE-LUTEIN  1 tablet Oral Daily   • [MAR Hold] losartan  100 mg Oral Daily   • [MAR Hold] acetaminophen  650 mg Oral Once   • [MAR Hold] carvedilol  25 mg Oral BID with meals   • Donepezil HCl  10 mg Oral Nightly   • Levothyroxine Sodium

## 2020-01-17 NOTE — PHYSICAL THERAPY NOTE
Chart reviewed, pt with respiratory distress, bradycardia yesterday 1/16 then became unresponsive. S/p CPR and intubation. Still intubated and sedated. Will place pt on HOLD for therapy. Please re-consult with new PT orders when pt appropriate.

## 2020-01-17 NOTE — PLAN OF CARE
Problem: ALTERED NUTRIENT INTAKE - ADULT  Goal: Nutrient intake appropriate for improving, restoring or maintaining nutritional needs  Description  INTERVENTIONS:  - Assess nutritional status and recommend course of action  - Monitor, labs, and treatment

## 2020-01-17 NOTE — OCCUPATIONAL THERAPY NOTE
Patient with respiratory/PEA arrest event overnight requiring resuscitation and intubation. Patient will require new OT orders for re-evaluation once he is medically appropriate. Will place on hold at this time.

## 2020-01-17 NOTE — CONSULTS
[facility]    Report of Consultation    Isidro Lara Patient Status:  Inpatient    1940 MRN [id number]   Location [facility] 2W/SW Attending Jhony Melchor, DO   Hosp Day # 7 PCP No primary care provider on file. Medications:  Norepinephrine Bitartrate (LEVOPHED) 16 mg in dextrose 5 % 250 mL infusion, 0.5-30 mcg/min, Intravenous, Continuous  0.9% NaCl infusion, , Intravenous, Once  Heparin Sodium (Porcine) 5000 UNIT/ML injection 5,000 Units, 5,000 Units, Subcutaneo 50mg in D5W 250ml, 5-300 mcg/min, Intravenous, Continuous PRN  metoprolol Tartrate (LOPRESSOR) tab 25 mg, 25 mg, Oral, 2x Daily(Beta Blocker)  milrinone (PRIMACOR) 20mg in D5W 100 mL premix infusion, 0.375 mcg/kg/min, Intravenous, PRN  docusate sodium (COL VITAMIN) tab 1 tablet, 1 tablet, Oral, Daily  [MAR Hold] losartan (COZAAR) tab 100 mg, 100 mg, Oral, Daily  [MAR Hold] acetaminophen (TYLENOL) tab 650 mg, 650 mg, Oral, Once  [MAR Hold] carvedilol (COREG) tab 25 mg, 25 mg, Oral, BID with meals  nitroGLYCER daily.  Acidophilus/Pectin Oral Cap, Take 1 capsule by mouth daily. OxyCODONE HCl IR 10 MG Oral Tab, Take 10 mg by mouth every 6 (six) hours as needed for Pain.   Ondansetron HCl (ZOFRAN) 8 MG tablet, Take 8 mg by mouth every 8 (eight) hours as needed for 1/17/2020  CONCLUSION:  1. Mild cardiomegaly. CABG  2. Support tubes and catheters satisfactory 3. Bilateral mixed alveolar and interstitial opacification in the perihilar and lower lobe regions left worse than right with interval progression.   Left-sided

## 2020-01-17 NOTE — PROGRESS NOTES
[facility]    Progress Note    Ellie Dsouza Patient Status:  Inpatient    1940 MRN [id number]   Location [facility] 2W/SW Attending Yannick Maddox, DO   Hosp Day # 7 PCP No primary care provider on file.        Subject (H) 01/17/2020     01/17/2020    K 4.3 01/17/2020     01/17/2020    CO2 28.0 01/17/2020     (H) 01/17/2020    CA 7.3 (L) 01/17/2020    ALB 2.1 (L) 01/16/2020    ALKPHO 70 01/16/2020    BILT 0.4 01/16/2020    TP 5.3 (L) 01/16/2020    AST

## 2020-01-17 NOTE — SIGNIFICANT EVENT
ICU nocturnist.    CHAD HALL called to room 230.     On my arrival CPR in progress, patient given 1 dose of epinephrine and atropine prior to my arrival.  As per RN patient became short of breath, bradycardic and unresponsive and pulseless at which time cod

## 2020-01-17 NOTE — PLAN OF CARE
Patient is forgetful and intermittently confused. Discussed with family that decreasing narcotics was advised. Patient is on 3L O2 with shallow breathing. Duonebs ordered and EZ PAP ordered. Dr. Tiana Armenta notified. Sinus rhythm and hemodynamically stable.  Do

## 2020-01-17 NOTE — ANESTHESIA PROCEDURE NOTES
Arterial Line  Performed by: Odin Paul MD  Authorized by: Vega Karimi MD     General Information and Staff    Procedure Start:  1/16/2020 9:30 AM  Procedure End:  1/16/2020 9:35 AM  Anesthesiologist:  Odin Paul MD  Performed By:  Umberto Oconnor

## 2020-01-17 NOTE — PLAN OF CARE
Intubated/sedated, pulling at lines/tubes requiring restraints. Tolerating. Family at bedside, educated on need for restraints. Continue to monitor.      Problem: Safety Risk - Non-Violent Restraints  Goal: Patient will remain free from self-harm  [address] [facility]

## 2020-01-17 NOTE — PROGRESS NOTES
[place] [facility] - [facility]    Progress Note    Auburn Adriana Patient Status:  Inpatient    1940 MRN [id number]   Location [facility] 2W/SW Attending Marquis Sinclair DO   Hosp Day # 7 PCP No primary care provider on file.         Subj inspection bronch     LE doppler + DVT   Unable to get ct b/o high Cr     IV heparin   Supportive care   IV fluid and antibiotics   Vasopressors   A-line and PICC line in place   Vent support     3- acute respiratory failure   Now on vent with 60 % Fio2 wi No evidence of left lower extremity DVT. 3. A 4.6 x 1.1 x 3 cm complex region in the right popliteal fossa likely represents a complex Baker's cyst with intrabursal bodies or possibly organizing hematoma.     Dictated by (CST): Agustina Cisneros MD on 1/17/202 01/15/2020 05:51:09 No significant changes have occurred Electronically signed on 01/17/2020 at 17:08 by ROGER Orta.  Shubham Alegre MD  1/17/2020

## 2020-01-17 NOTE — PROGRESS NOTES
[facility] Pharmacy Note: Antimicrobial Weight Based Dose Adjustment for: piperacillin/tazobactam ([place])    Roberto Carlos Fletcher is a 78year old male who has been prescribed piperacillin/tazobactam (ZOSYN) 3.375 g every 8 hours.     Estimated Creatinine Clearance:

## 2020-01-17 NOTE — RESPIRATORY THERAPY NOTE
Patient placed on the ventilator post resuscitation, placed on   V/C A/C 12/500/100%/5+  ETT- size 7.5, 23 at the lip  Suctioned pt. as needed    Current ventilator readings:     01/16/20 2100   Readings   Total RR 27   Minute Ventilation (L/min) 12.6 L/

## 2020-01-17 NOTE — SLP NOTE
SLP attempt for ongoing dysphagia therapy. Pt currently intubated and not appropriate for tx at this time. Pt will require new SLP orders for re-evaluation as pt safe/appropriate to participate. Thank you.     Rock Thompson M.S. [address] [address]

## 2020-01-17 NOTE — PLAN OF CARE
Patient at start of shift was anxious, yet cooperative. Did endorsed SOB/dyspnea, which has presented throughout the course of the past 24 hours. Patient was order duonebps, ezpap, and instructed on importance of frequent IS use for pulmonary toileting.  Wa family  - Bed Alarm/Chair Alarm, Frequent Rounding  - Reposition as needed  - See additional Care Plan goals for specific interventions   Outcome: Not Progressing  Note:   \"get better\"     Problem: Patient Centered Care  Goal: Patient preferences are [place] assistance with activity based on assessment  - Modify environment to reduce risk of injury  - Provide assistive devices as appropriate  - Consider OT/PT consult to assist with strengthening/mobility  - Encourage toileting schedule  Outcome: Progressing Avoid use of toothpicks and dental floss  - Use electric shaver for shaving  - Use soft bristle tooth brush  - Limit straining and forceful nose blowing  Outcome: Progressing     Problem: HEMATOLOGIC - ADULT  Goal: Free from bleeding injury  Description  ( oxygenation  Description  INTERVENTIONS:  - Assess for changes in respiratory status  - Assess for changes in mentation and behavior  - Position to facilitate oxygenation and minimize respiratory effort  - Oxygen supplementation based on oxygen saturation Electrolytes maintained within normal limits  Description  INTERVENTIONS:  - Monitor Blood Glucose as ordered  - Assess for signs and symptoms of hyperglycemia and hypoglycemia  - Administer ordered medications to maintain glucose within target range  - As protection for wounds/incisions during mobilization  - Obtain PT/OT consults as needed  - Instruct patient/family in ordered activity level  Outcome: Not Progressing  Goal: Maintain proper alignment of affected body part  Description  INTERVENTIONS:  - Sup

## 2020-01-17 NOTE — DIETARY NOTE
Nutrition Education Defer Note    Consult received for diet education s/p PCI. Pt intubated and sedated 1/16 d/t respiratory failure. Education not appropriate at this time.  Education deferred until patient transferred out of ICU or until s/p intervention

## 2020-01-17 NOTE — ADDENDUM NOTE
Addendum  created 01/16/20 2226 by Virginia Paul MD    Child order released for a procedure order, Clinical Note Signed, Intraprocedure Blocks edited, LDA created via procedure documentation

## 2020-01-17 NOTE — PROGRESS NOTES
Patient developed respiratory distress became bradycardic and coded this evening. I was called at home. I saw the patient in the mid afternoon and noted that he could only get up to 500 on incentive spirometry.   I encouraged him to keep working on it [employer] to do a CT angiogram for fear of making his kidneys worse. Patient's family was at the bedside including wife son and daughter and have thoroughly discussed all the above with them as well as the plan going forward.

## 2020-01-17 NOTE — PROGRESS NOTES
responded to code blue. Family members at patient's bedside. Patient is of the [address] [place] [address].  provided empathic listening and spiritual support. Follow up with on going spiritual support.        01/16/20 6127   [facility] Encounte

## 2020-01-17 NOTE — PROGRESS NOTES
[facility]    Progress Note    Brenda Ray Patient Status:  Inpatient    1940 MRN [id number]   Location [facility] 2W/SW Attending Kelsie Mcbride DO   Hosp Day # 7 PCP No primary care provider on file.      Subject sedated    Assessment/Plan:  S/P CABG x 3 POD # 3  Resp. Failure requiring full vent support. Currently on 60% Fio2. Mgt per Intensivist   Resp arrest/cardiac arrest (bradycardia-PEA- CPR) on 1/16 requiring intubation.  Echo this AM.   Pain meds as needed; on same dose Levo and Dobuta. Updated plan of care to patients daughter, patient's sister and her son who are in waiting room. All questions answered. They verbalized understanding of plan.        Clarissa Allen  1/17/2020  7:55 AM

## 2020-01-18 ENCOUNTER — APPOINTMENT (OUTPATIENT)
Dept: GENERAL RADIOLOGY | Facility: HOSPITAL | Age: 80
DRG: 003 | End: 2020-01-18
Attending: CLINICAL NURSE SPECIALIST
Payer: MEDICARE

## 2020-01-18 LAB
ALBUMIN SERPL-MCNC: 1.8 G/DL (ref 3.4–5)
ALBUMIN/GLOB SERPL: 0.5 {RATIO} (ref 1–2)
ALP LIVER SERPL-CCNC: 115 U/L (ref 45–117)
ALT SERPL-CCNC: 21 U/L (ref 16–61)
ANION GAP SERPL CALC-SCNC: 6 MMOL/L (ref 0–18)
APTT PPP: 48.7 SECONDS (ref 23.2–35.3)
AST SERPL-CCNC: 41 U/L (ref 15–37)
BASOPHILS # BLD AUTO: 0.01 X10(3) UL (ref 0–0.2)
BASOPHILS NFR BLD AUTO: 0.1 %
BILIRUB SERPL-MCNC: 0.5 MG/DL (ref 0.1–2)
BUN BLD-MCNC: 44 MG/DL (ref 7–18)
BUN/CREAT SERPL: 19.6 (ref 10–20)
CALCIUM BLD-MCNC: 7.3 MG/DL (ref 8.5–10.1)
CHLORIDE SERPL-SCNC: 105 MMOL/L (ref 98–112)
CO2 SERPL-SCNC: 26 MMOL/L (ref 21–32)
CREAT BLD-MCNC: 2.24 MG/DL (ref 0.7–1.3)
DEPRECATED RDW RBC AUTO: 52.5 FL (ref 35.1–46.3)
DEPRECATED RDW RBC AUTO: 54.2 FL (ref 35.1–46.3)
EOSINOPHIL # BLD AUTO: 0.03 X10(3) UL (ref 0–0.7)
EOSINOPHIL NFR BLD AUTO: 0.3 %
ERYTHROCYTE [DISTWIDTH] IN BLOOD BY AUTOMATED COUNT: 16.5 % (ref 11–15)
ERYTHROCYTE [DISTWIDTH] IN BLOOD BY AUTOMATED COUNT: 16.9 % (ref 11–15)
GLOBULIN PLAS-MCNC: 3.3 G/DL (ref 2.8–4.4)
GLUCOSE BLD-MCNC: 144 MG/DL (ref 70–99)
GLUCOSE BLDC GLUCOMTR-MCNC: 138 MG/DL (ref 70–99)
GLUCOSE BLDC GLUCOMTR-MCNC: 161 MG/DL (ref 70–99)
GLUCOSE BLDC GLUCOMTR-MCNC: 171 MG/DL (ref 70–99)
GLUCOSE BLDC GLUCOMTR-MCNC: 175 MG/DL (ref 70–99)
HAV IGM SER QL: 2.6 MG/DL (ref 1.6–2.6)
HCT VFR BLD AUTO: 23.1 % (ref 39–53)
HCT VFR BLD AUTO: 24.3 % (ref 39–53)
HGB BLD-MCNC: 7 G/DL (ref 13–17.5)
HGB BLD-MCNC: 7.6 G/DL (ref 13–17.5)
HGB BLD-MCNC: 8.1 G/DL (ref 13–17.5)
IMM GRANULOCYTES # BLD AUTO: 0.14 X10(3) UL (ref 0–1)
IMM GRANULOCYTES NFR BLD: 1.4 %
INR BLD: 1.29 (ref 0.9–1.2)
LYMPHOCYTES # BLD AUTO: 1.24 X10(3) UL (ref 1–4)
LYMPHOCYTES NFR BLD AUTO: 12.2 %
M PROTEIN MFR SERPL ELPH: 5.1 G/DL (ref 6.4–8.2)
MCH RBC QN AUTO: 29.1 PG (ref 26–34)
MCH RBC QN AUTO: 29.5 PG (ref 26–34)
MCHC RBC AUTO-ENTMCNC: 32.9 G/DL (ref 31–37)
MCHC RBC AUTO-ENTMCNC: 33.3 G/DL (ref 31–37)
MCV RBC AUTO: 88.4 FL (ref 80–100)
MCV RBC AUTO: 88.5 FL (ref 80–100)
MONOCYTES # BLD AUTO: 1.42 X10(3) UL (ref 0.1–1)
MONOCYTES NFR BLD AUTO: 14 %
NEUTROPHILS # BLD AUTO: 7.3 X10 (3) UL (ref 1.5–7.7)
NEUTROPHILS # BLD AUTO: 7.3 X10(3) UL (ref 1.5–7.7)
NEUTROPHILS NFR BLD AUTO: 72 %
OSMOLALITY SERPL CALC.SUM OF ELEC: 298 MOSM/KG (ref 275–295)
PHOSPHATE SERPL-MCNC: 3.4 MG/DL (ref 2.5–4.9)
PLATELET # BLD AUTO: 173 10(3)UL (ref 150–450)
PLATELET # BLD AUTO: 187 10(3)UL (ref 150–450)
POTASSIUM SERPL-SCNC: 3.8 MMOL/L (ref 3.5–5.1)
PROTHROMBIN TIME: 16 SECONDS (ref 11.8–14.5)
RBC # BLD AUTO: 2.61 X10(6)UL (ref 3.8–5.8)
RBC # BLD AUTO: 2.75 X10(6)UL (ref 3.8–5.8)
SODIUM SERPL-SCNC: 137 MMOL/L (ref 136–145)
WBC # BLD AUTO: 10.1 X10(3) UL (ref 4–11)
WBC # BLD AUTO: 10.6 X10(3) UL (ref 4–11)

## 2020-01-18 PROCEDURE — 71045 X-RAY EXAM CHEST 1 VIEW: CPT | Performed by: CLINICAL NURSE SPECIALIST

## 2020-01-18 PROCEDURE — 99291 CRITICAL CARE FIRST HOUR: CPT | Performed by: INTERNAL MEDICINE

## 2020-01-18 RX ORDER — METRONIDAZOLE 500 MG/100ML
500 INJECTION, SOLUTION INTRAVENOUS EVERY 8 HOURS
Status: DISCONTINUED | OUTPATIENT
Start: 2020-01-18 | End: 2020-01-23

## 2020-01-18 RX ORDER — SODIUM CHLORIDE 9 MG/ML
INJECTION, SOLUTION INTRAVENOUS ONCE
Status: COMPLETED | OUTPATIENT
Start: 2020-01-18 | End: 2020-01-18

## 2020-01-18 RX ORDER — METOCLOPRAMIDE HYDROCHLORIDE 5 MG/ML
5 INJECTION INTRAMUSCULAR; INTRAVENOUS EVERY 6 HOURS
Status: DISCONTINUED | OUTPATIENT
Start: 2020-01-18 | End: 2020-01-18

## 2020-01-18 RX ORDER — BUMETANIDE 0.25 MG/ML
2 INJECTION, SOLUTION INTRAMUSCULAR; INTRAVENOUS ONCE
Status: COMPLETED | OUTPATIENT
Start: 2020-01-18 | End: 2020-01-18

## 2020-01-18 NOTE — PROGRESS NOTES
[facility] ID PROGRESS NOTE    Addis Nicholas Patient Status:  Inpatient    1940 MRN [id number]   Location [facility] 2W/SW Attending Concetta Boateng Angella Day # 8 PCP No primary care provider on file. Subjective:  No fever.  BP/HR originally in 2015 and then had to be revised due to dislocation in 2016. Per pt, since that time has had pain on and off, but more recently.  In Jan 2019 had a hip aspiration and this showed staph epi and pt was seen by another ID MD. At that time, no surg

## 2020-01-18 NOTE — PLAN OF CARE
POD #4 s/p CABG x3, intubated & sedated. Levophed off overnight & weaning low-dose dobutamine; transitioned from precedex to propofol as pt was uncomfortable on precedex. -130s. NSR - pacer turned off but still connected.    Hemoglobin this AM 8.1 Incorporate patient and family knowledge, values, beliefs, and cultural backgrounds into the planning and delivery of care  - Encourage patient/family to participate in care and decision-making at the level they choose  - Honor patient and family perspecti reduce risk of injury  - Provide assistive devices as appropriate  - Consider OT/PT consult to assist with strengthening/mobility  - Encourage toileting schedule  Outcome: Progressing     Problem: GASTROINTESTINAL - ADULT  Goal: Minimal or absence of nause shaving  - Use soft bristle tooth brush  - Limit straining and forceful nose blowing  Outcome: Progressing     Problem: CARDIOVASCULAR - ADULT  Goal: Maintains optimal cardiac output and hemodynamic stability  Description  INTERVENTIONS:  - Monitor vital s output and hemodynamic stability  Description  INTERVENTIONS:  - Monitor vital signs and trends  - Administer ordered vasoactive medications  - Assess skin color and temperature  Outcome: Progressing  Goal: Absence of cardiac arrhythmias or at baseline  Emiliana Delarosa restriction as ordered  - Instruct patient on fluid and nutrition restrictions as appropriate  Outcome: Progressing     Problem: Skin/tissue integrity  Goal: Incisions, wounds, or drain sites healing without s/s of infection  Description  INTERVENTIONS:  - provider's orders  - Instruct and reinforce with patient and family use of appropriate assistive device and precautions  Outcome: Progressing     Problem: Safety Risk - Non-Violent Restraints  Goal: Patient will remain free from self-harm  Description  INT

## 2020-01-18 NOTE — PROGRESS NOTES
Pulmonary/Critical Care Follow Up Note    HPI:   Barak Mello is a 78year old male with No chief complaint on file. PCP No primary care provider on file.   Admission Attending Kathy Peng 15 Day #8    Awake and follows commands  C ipratropium-albuterol (DUONEB) nebulizer solution 3 mL, 3 mL, Nebulization, Q6H PRN  •  HYDROcodone-acetaminophen (NORCO) 5-325 MG per tab 1 tablet, 1 tablet, Oral, Q4H PRN  •  ALPRAZolam (XANAX) tab 0.25 mg, 0.25 mg, Oral, TID PRN  •  Normal Saline Flush PRN  •  ondansetron HCl (ZOFRAN) injection 4 mg, 4 mg, Intravenous, Q6H PRN  •  potassium chloride IVPB premix 20 mEq, 20 mEq, Intravenous, PRN **OR** potassium chloride IVPB premix 40 mEq, 40 mEq, Intravenous, PRN  •  Magnesium Sulfate in D5W IVPB premix mL, Intravenous, PRN  •  [MAR Hold] Glucose-Vitamin C (DEX-4) chewable tab 4 tablet, 4 tablet, Oral, Q15 Min PRN  •  [MAR Hold] glucose (DEX4) oral liquid 15 g, 15 g, Oral, Q15 Min PRN  •  mirtazapine (REMERON) tab 15 mg, 15 mg, Oral, Nightly PRN  •  tamsu following   + 4000 cc  SCR inc'ed today  Plan   Bumex prn   Follow SCR   Renal follwing     s/p revision of total replacement of left hip joint on 1/10/2020   H/o   Infection of prosthetic total hip joint  ID and ortho following   Plan cont iv abx        O

## 2020-01-18 NOTE — PROGRESS NOTES
[facility]    Progress Note    Susannah Bhakta Patient Status:  Inpatient    1940 MRN [id number]   Location [facility] 2W/SW Attending Cristy Cooper,    Hosp Day # 8 PCP No primary care provider on file.        Subject --  173.0         Recent Labs   Lab 01/16/20  2222 01/17/20  0438 01/18/20  0502   * 148* 144*   BUN 34* 37* 44*   CREATSERUM 1.86* 1.87* 2.24*   GFRAA 38* 38* 31*   GFRNAA 33* 33* 27*   CA 7.1* 7.3* 7.3*    139 137   K 4.7 4.3 3.8    consolidation/atelectasis re-identified left greater than right similar to January 15, 2020. Dictated by (CST): Yoly Cancino MD on 1/16/2020 at 10:44     Approved by (CST):  Yoly Cancino MD on 1/16/2020 at 10:46                Chester Chinchilla MD

## 2020-01-18 NOTE — PROGRESS NOTES
[facility] Pharmacy Note:  Renal Adjustment for vancomycin (VANCOCIN)    Franky Villagran is a 78year old male who has been prescribed vancomycin (VANCOCIN) 1250 mg every 24 hrs.   CrCl is estimated creatinine clearance is 23.3 mL/min (A) (based on SCr of 2.24 m

## 2020-01-18 NOTE — PROGRESS NOTES
[facility]    Cardiology - [facility]  Progress Note    Ethyl Frame Patient Status:  Inpatient    1940 MRN [id number]   [facility] 2W/SW Attending Allie Ryan Day # 8 PCP No primary care provider Date: 1/17/2020  CONCLUSION:  1. Mild cardiomegaly. CABG  2. Support tubes and catheters satisfactory 3. Bilateral mixed alveolar and interstitial opacification in the perihilar and lower lobe regions left worse than right with interval progression.   Left Oral Before breakfast   • atorvastatin  10 mg Oral Nightly   • tamsulosin HCl  0.8 mg Oral Nightly     Continuous Infusions:   • norepinephrine 8 mcg/min (01/17/20 0829)   • dexmedetomidine 1.2 mcg/kg/hr (01/17/20 0800)   • sodium chloride 100 mL/hr at 01/

## 2020-01-18 NOTE — PLAN OF CARE
Problem: Patient/Family Goals  Goal: Patient/Family Short Term Goal  Description  Patient's Short Term Goal: \"per Daughter - we have a caregiver at night for him, last time he had surgery he was disoriented\"    Interventions:   - Caregiver at night pro influences on pain and pain management  - Manage/alleviate anxiety  - Utilize distraction and/or relaxation techniques  - Monitor for opioid side effects  - Notify MD/LIP if interventions unsuccessful or patient reports new pain  - Anticipate increased george STARTED AS ORDERED. MEDS GIVEN AS ORDERED.       Problem: SKIN/TISSUE INTEGRITY - ADULT  Goal: Skin integrity remains intact  Description  INTERVENTIONS  - Assess and document risk factors for pressure ulcer development  - Assess and document skin integrity trends  - Monitor for bleeding, hypotension and signs of decreased cardiac output  - Evaluate effectiveness of vasoactive medications to optimize hemodynamic stability  - Monitor arterial and/or venous puncture sites for bleeding and/or hematoma  - Assess range  Description  INTERVENTIONS:  - Monitor Blood Glucose as ordered  - Assess for signs and symptoms of hyperglycemia and hypoglycemia  - Administer ordered medications to maintain glucose within target range  - Assess barriers to adequate nutritional i

## 2020-01-18 NOTE — RESPIRATORY THERAPY NOTE
Pt received on full vent support: AC 12/500/+5/50%. Suction provided as indicated. No changes were made overnight. RT to continue to monitor.

## 2020-01-18 NOTE — PLAN OF CARE
Pt with periods of agitation & anxiety. Will reach for any lines he can get his fingers on to pull at. Nods in understanding that he needs the ETT in place, but will also try to pull lines out. Restraints remain in place.     Problem: Safety Risk - Non-Viol

## 2020-01-18 NOTE — PROGRESS NOTES
[facility]    Progress Note    Judithmikael Sosa Patient Status:  Inpatient    1940 MRN [id number]   Location [facility] 2W/SW Attending Minna Horner,    Hosp Day # 8 PCP No primary care provider on file.      Subject BS+x4, +BM   Extremities: Mildly tepid, dry, 1+ LE edema bilat  Pulses: 1+ bilat DP (confirmed by doppler)  Skin: sternotomy incision RAVEN=CDI w/AV pacing wires intact/Right leg incision drsg: NIGEL   Neurological:  Awake, calm and cooperative/able to follow and is stable. Amio protocol for AF prevention as pt. Is considered high risk for post op AF. Not planning to continue at discharge if no AF noted post op. Discharge planning: rehab. SW involved with planning.      Addendum @ [phone]:   Rounded with CV Virgen

## 2020-01-18 NOTE — PROGRESS NOTES
[facility] Pharmacy Note:  Renal Dose Adjustment for Metoclopramide (REGLAN)    Jorge Sena has been prescribed Metoclopramide (REGLAN) 10 mg every 6 hours. Estimated Creatinine Clearance: 23.3 mL/min (A) (based on SCr of 2.24 mg/dL (H)).     His calculat

## 2020-01-18 NOTE — PROGRESS NOTES
[facility] Pharmacy Note:  Renal Adjustment for cefepime (MAXIPIME)    Kimi Ryan is a 78year old male who has been prescribed cefepime (MAXIPIME) 1000 mg every 8 hrs.   CrCl is estimated creatinine clearance is 23.3 mL/min (A) (based on SCr of 2.24 mg/dL

## 2020-01-18 NOTE — PROGRESS NOTES
[facility] dosing service    Follow-up Pharmacokinetic Consult for Vancomycin Dosing     Roxanne Donato is a 78year old male who is being treated for infected L THR .    Patient is on day 8 of Vancomycin and is currently receiving 1.5 gm IV Q 24 hours 105.9 kg and renal function)    2. Will re-check Vancomycin trough levels in 5-7 days. Goal trough level 15-20 ug/mL. 3.  Pharmacy will need Scr daily while on Vancomycin to assess renal function. 4.  Pharmacy will follow and adjust as necessary.

## 2020-01-19 ENCOUNTER — APPOINTMENT (OUTPATIENT)
Dept: GENERAL RADIOLOGY | Facility: HOSPITAL | Age: 80
DRG: 003 | End: 2020-01-19
Attending: CLINICAL NURSE SPECIALIST
Payer: MEDICARE

## 2020-01-19 LAB
ALBUMIN SERPL-MCNC: 1.7 G/DL (ref 3.4–5)
ANION GAP SERPL CALC-SCNC: 7 MMOL/L (ref 0–18)
APTT PPP: 49.7 SECONDS (ref 23.2–35.3)
BASOPHILS # BLD AUTO: 0.01 X10(3) UL (ref 0–0.2)
BASOPHILS NFR BLD AUTO: 0.1 %
BLOOD TYPE BARCODE: 5100
BLOOD TYPE BARCODE: 5100
BUN BLD-MCNC: 47 MG/DL (ref 7–18)
BUN/CREAT SERPL: 21.5 (ref 10–20)
CALCIUM BLD-MCNC: 7.3 MG/DL (ref 8.5–10.1)
CHLORIDE SERPL-SCNC: 104 MMOL/L (ref 98–112)
CO2 SERPL-SCNC: 27 MMOL/L (ref 21–32)
CREAT BLD-MCNC: 2.19 MG/DL (ref 0.7–1.3)
DEPRECATED RDW RBC AUTO: 54.2 FL (ref 35.1–46.3)
EOSINOPHIL # BLD AUTO: 0.21 X10(3) UL (ref 0–0.7)
EOSINOPHIL NFR BLD AUTO: 2.1 %
ERYTHROCYTE [DISTWIDTH] IN BLOOD BY AUTOMATED COUNT: 16.8 % (ref 11–15)
GLUCOSE BLD-MCNC: 137 MG/DL (ref 70–99)
GLUCOSE BLDC GLUCOMTR-MCNC: 139 MG/DL (ref 70–99)
GLUCOSE BLDC GLUCOMTR-MCNC: 148 MG/DL (ref 70–99)
GLUCOSE BLDC GLUCOMTR-MCNC: 154 MG/DL (ref 70–99)
GLUCOSE BLDC GLUCOMTR-MCNC: 175 MG/DL (ref 70–99)
HAPTOGLOB SERPL-MCNC: 349 MG/DL (ref 30–200)
HCT VFR BLD AUTO: 25 % (ref 39–53)
HGB BLD-MCNC: 8.1 G/DL (ref 13–17.5)
IMM GRANULOCYTES # BLD AUTO: 0.27 X10(3) UL (ref 0–1)
IMM GRANULOCYTES NFR BLD: 2.7 %
LYMPHOCYTES # BLD AUTO: 1.18 X10(3) UL (ref 1–4)
LYMPHOCYTES NFR BLD AUTO: 11.8 %
MCH RBC QN AUTO: 28.8 PG (ref 26–34)
MCHC RBC AUTO-ENTMCNC: 32.4 G/DL (ref 31–37)
MCV RBC AUTO: 89 FL (ref 80–100)
MONOCYTES # BLD AUTO: 1.45 X10(3) UL (ref 0.1–1)
MONOCYTES NFR BLD AUTO: 14.6 %
NEUTROPHILS # BLD AUTO: 6.84 X10 (3) UL (ref 1.5–7.7)
NEUTROPHILS # BLD AUTO: 6.84 X10(3) UL (ref 1.5–7.7)
NEUTROPHILS NFR BLD AUTO: 68.7 %
OSMOLALITY SERPL CALC.SUM OF ELEC: 300 MOSM/KG (ref 275–295)
PHOSPHATE SERPL-MCNC: 3 MG/DL (ref 2.5–4.9)
PLATELET # BLD AUTO: 182 10(3)UL (ref 150–450)
POTASSIUM SERPL-SCNC: 3.4 MMOL/L (ref 3.5–5.1)
RBC # BLD AUTO: 2.81 X10(6)UL (ref 3.8–5.8)
SODIUM SERPL-SCNC: 138 MMOL/L (ref 136–145)
VANCOMYCIN SERPL-MCNC: 18.8 UG/ML
WBC # BLD AUTO: 10 X10(3) UL (ref 4–11)

## 2020-01-19 PROCEDURE — 71045 X-RAY EXAM CHEST 1 VIEW: CPT | Performed by: CLINICAL NURSE SPECIALIST

## 2020-01-19 PROCEDURE — 99291 CRITICAL CARE FIRST HOUR: CPT | Performed by: INTERNAL MEDICINE

## 2020-01-19 RX ORDER — MINERAL OIL AND PETROLATUM 150; 830 MG/G; MG/G
OINTMENT OPHTHALMIC 3 TIMES DAILY
Status: DISCONTINUED | OUTPATIENT
Start: 2020-01-19 | End: 2020-01-24

## 2020-01-19 RX ORDER — VANCOMYCIN HYDROCHLORIDE
15
Status: DISCONTINUED | OUTPATIENT
Start: 2020-01-19 | End: 2020-01-21

## 2020-01-19 NOTE — PROGRESS NOTES
[facility]    Progress Note    Heather Weems Patient Status:  Inpatient    1940 MRN [id number]   Location [facility] 2W/SW Attending Anthony White,    Hosp Day # 9 PCP No primary care provider on file.      Subject Neurological:  sedated    Assessment/Plan:  S/P CABG x 3 POD # 5  Resp. Failure requiring full vent support. Currently on 40% Fio2. Mgt per Intensivist. CXR pending.    Resp arrest/cardiac arrest (bradycardia-PEA- CPR) on 1/16 requiring intubation; suspic CXR reviewed. Cardiology also at bedside. Both recommending Bumex drip; 0.5mg/hr to start. Increasing Heparin IV. Decrease IVF to 20cc/hr. Stop Plavix.     Luis Eduardo Caldwell  1/19/2020  8:18 AM

## 2020-01-19 NOTE — PLAN OF CARE
Remains intubated, sedated, more comfortable today. No SBT today. SB on monitor, BP stable. Weaning dobutamine. Started bumex gtt. Resting in bed comfortably. Bed in low locked position, side rails up x3, call light within reach.  Frequent nursing rou Monitor Blood Glucose as ordered  - Assess for signs and symptoms of hyperglycemia and hypoglycemia  - Administer ordered medications to maintain glucose within target range  - Assess barriers to adequate nutritional intake and initiate nutrition consult a suction as ordered  - Evaluate effectiveness of ordered antiemetic medications  - Provide nonpharmacologic comfort measures as appropriate  - Advance diet as tolerated, if ordered  - Obtain nutritional consult as needed  - Evaluate fluid balance  Outcome: optimize hemodynamic stability  - Monitor arterial and/or venous puncture sites for bleeding and/or hematoma  - Assess quality of pulses, skin color and temperature  - Assess for signs of decreased coronary artery perfusion - ex.  Angina  - Evaluate fluid b ordered  - Initiate emergency measures for life threatening arrhythmias  - Monitor electrolytes and administer replacement therapy as ordered  Outcome: Progressing     Problem: Respiratory  Goal: Achieves optimal ventilation and oxygenation  Description  I document dressing/incision, wound bed, drain sites and surrounding tissue  - Implement wound care per orders  - Initiate self-injury protocol  - Implement isolation precautions as appropriate  - Initiate Pressure Ulcer prevention bundle as indicated  Outco factors to confusion (electrolyte disturbances, delirium, medications)  - Discontinue any unnecessary medical devices as soon as possible  - Assess the patient's physical comfort, circulation, skin condition, hydration, nutrition and elimination needs   -

## 2020-01-19 NOTE — PROGRESS NOTES
[facility]    Cardiology - [facility]  Progress Note    Ailin Cervantes Patient Status:  Inpatient    1940 MRN [id number]   Location [facility] 2W/SW Attending Cara Jimenez Day # 9 PCP No primary care provider (cpt=[id number]), Result Date: 1/17/2020  CONCLUSION:  1. Mild cardiomegaly. CABG  2. Support tubes and catheters satisfactory 3.  Bilateral mixed alveolar and interstitial opacification in the perihilar and lower lobe regions left worse than right with interva Levothyroxine Sodium  50 mcg Oral Before breakfast   • atorvastatin  10 mg Oral Nightly   • tamsulosin HCl  0.8 mg Oral Nightly     Continuous Infusions:   • norepinephrine 8 mcg/min (01/17/20 0829)   • dexmedetomidine 1.2 mcg/kg/hr (01/17/20 0800)   • sod

## 2020-01-19 NOTE — PROGRESS NOTES
[facility] Pharmacy Note: Antimicrobial Weight Based Dose Adjustment for: cefepime (MAXIPIME)    Ailin Cervantes is a 78year old male who has been prescribed cefepime (MAXIPIME) 1000 mg every 12 hours.     Estimated Creatinine Clearance: 23.9 mL/min (A) (based Location Indication Override (Is Already Calculated Based On Selected Body Location): Area M

## 2020-01-19 NOTE — PROGRESS NOTES
Pulmonary/Critical Care Follow Up Note    HPI:   Ellie Dsouza is a 78year old male with No chief complaint on file. PCP No primary care provider on file.   Admission Attending Kathy Florez 15 Day #9    Awake and follows commands  F 0.9% NaCl infusion, , Intravenous, Once  •  ipratropium-albuterol (DUONEB) nebulizer solution 3 mL, 3 mL, Nebulization, Q6H PRN  •  HYDROcodone-acetaminophen (NORCO) 5-325 MG per tab 1 tablet, 1 tablet, Oral, Q4H PRN  •  ALPRAZolam (XANAX) tab 0.25 mg, 0.2 suppository 10 mg, 10 mg, Rectal, Daily PRN  •  ondansetron HCl (ZOFRAN) injection 4 mg, 4 mg, Intravenous, Q6H PRN  •  potassium chloride IVPB premix 20 mEq, 20 mEq, Intravenous, PRN **OR** potassium chloride IVPB premix 40 mEq, 40 mEq, Intravenous, PRN [MAR Hold] glucose (DEX4) oral liquid 15 g, 15 g, Oral, Q15 Min PRN  •  mirtazapine (REMERON) tab 15 mg, 15 mg, Oral, Nightly PRN  •  tamsulosin HCl (FLOMAX) 0.4 MG cap 0.8 mg, 0.8 mg, Oral, Nightly      Allergies:  No Known Allergies        PHYSICAL EXAM suzanna    S/p revision of total replacement of left hip joint on 1/10/2020  H/o   Infection of prosthetic total hip joint  ID and ortho following   Plan cont iv abx        ZEYAD   Plan CPAP or NIV after extubation     Prior h/o traumatic subdural hematoma

## 2020-01-19 NOTE — RESPIRATORY THERAPY NOTE
Received patient on full vent support AC12/500/50%/+5. No acute resp events overnight. Suction provided as needed. RT will continue to monitor.

## 2020-01-19 NOTE — PROGRESS NOTES
[facility]    Progress Note    Stanley Sumner Patient Status:  Inpatient    1940 MRN [id number]   Location [facility] 2W/SW Attending Judah Peres,    Hosp Day # 9 PCP No primary care provider on file.        Subject 196.0  --  173.0 187.0 182.0    < > = values in this interval not displayed.          Recent Labs   Lab 01/17/20  0438 01/18/20  0502 01/19/20  0509   * 144* 137*   BUN 37* 44* 47*   CREATSERUM 1.87* 2.24* 2.19*   GFRAA 38* 31* 31*   GFRNAA 33* 27* 2

## 2020-01-20 ENCOUNTER — APPOINTMENT (OUTPATIENT)
Dept: GENERAL RADIOLOGY | Facility: HOSPITAL | Age: 80
DRG: 003 | End: 2020-01-20
Attending: CLINICAL NURSE SPECIALIST
Payer: MEDICARE

## 2020-01-20 LAB
ANION GAP SERPL CALC-SCNC: 7 MMOL/L (ref 0–18)
APTT PPP: 52.5 SECONDS (ref 23.2–35.3)
BASE EXCESS BLD CALC-SCNC: 0.7 MMOL/L (ref ?–2)
BASOPHILS # BLD AUTO: 0.01 X10(3) UL (ref 0–0.2)
BASOPHILS NFR BLD AUTO: 0.1 %
BLOOD TYPE BARCODE: 5100
BUN BLD-MCNC: 51 MG/DL (ref 7–18)
BUN/CREAT SERPL: 21.7 (ref 10–20)
CALCIUM BLD-MCNC: 7.4 MG/DL (ref 8.5–10.1)
CHLORIDE SERPL-SCNC: 105 MMOL/L (ref 98–112)
CO2 SERPL-SCNC: 28 MMOL/L (ref 21–32)
CREAT BLD-MCNC: 2.35 MG/DL (ref 0.7–1.3)
DEPRECATED RDW RBC AUTO: 56.7 FL (ref 35.1–46.3)
EOSINOPHIL # BLD AUTO: 0.29 X10(3) UL (ref 0–0.7)
EOSINOPHIL NFR BLD AUTO: 3 %
ERYTHROCYTE [DISTWIDTH] IN BLOOD BY AUTOMATED COUNT: 17.3 % (ref 11–15)
GLUCOSE BLD-MCNC: 140 MG/DL (ref 70–99)
GLUCOSE BLDC GLUCOMTR-MCNC: 137 MG/DL (ref 70–99)
GLUCOSE BLDC GLUCOMTR-MCNC: 141 MG/DL (ref 70–99)
GLUCOSE BLDC GLUCOMTR-MCNC: 145 MG/DL (ref 70–99)
GLUCOSE BLDC GLUCOMTR-MCNC: 147 MG/DL (ref 70–99)
GLUCOSE BLDC GLUCOMTR-MCNC: 169 MG/DL (ref 70–99)
HAV IGM SER QL: 2.3 MG/DL (ref 1.6–2.6)
HCO3 BLDA-SCNC: 25.5 MEQ/L (ref 21–27)
HCT VFR BLD AUTO: 25 % (ref 39–53)
HEMOCCULT STL QL: NEGATIVE
HGB BLD-MCNC: 8.1 G/DL (ref 13–17.5)
IMM GRANULOCYTES # BLD AUTO: 0.43 X10(3) UL (ref 0–1)
IMM GRANULOCYTES NFR BLD: 4.5 %
LDH SERPL L TO P-CCNC: 264 U/L (ref 87–241)
LYMPHOCYTES # BLD AUTO: 0.79 X10(3) UL (ref 1–4)
LYMPHOCYTES NFR BLD AUTO: 8.3 %
MCH RBC QN AUTO: 29.2 PG (ref 26–34)
MCHC RBC AUTO-ENTMCNC: 32.4 G/DL (ref 31–37)
MCV RBC AUTO: 90.3 FL (ref 80–100)
MONOCYTES # BLD AUTO: 1.21 X10(3) UL (ref 0.1–1)
MONOCYTES NFR BLD AUTO: 12.7 %
NEUTROPHILS # BLD AUTO: 6.83 X10 (3) UL (ref 1.5–7.7)
NEUTROPHILS # BLD AUTO: 6.83 X10(3) UL (ref 1.5–7.7)
NEUTROPHILS NFR BLD AUTO: 71.4 %
O2/TOTAL GAS SETTING VFR VENT: 100 %
OSMOLALITY SERPL CALC.SUM OF ELEC: 306 MOSM/KG (ref 275–295)
PCO2 BLDA: 40 MM HG (ref 35–45)
PEEP SETTING VENT: 5 CM H2O
PH BLDA: 7.41 [PH] (ref 7.35–7.45)
PLATELET # BLD AUTO: 226 10(3)UL (ref 150–450)
PO2 BLDA: 156 MM HG (ref 80–100)
POTASSIUM SERPL-SCNC: 3.3 MMOL/L (ref 3.5–5.1)
POTASSIUM SERPL-SCNC: 3.6 MMOL/L (ref 3.5–5.1)
POTASSIUM SERPL-SCNC: 3.7 MMOL/L (ref 3.5–5.1)
PUNCTURE CHARGE: NO
RBC # BLD AUTO: 2.77 X10(6)UL (ref 3.8–5.8)
RESP RATE: 12 BPM
SODIUM SERPL-SCNC: 140 MMOL/L (ref 136–145)
SPECIMEN VOL 24H UR: 500 ML
WBC # BLD AUTO: 9.6 X10(3) UL (ref 4–11)

## 2020-01-20 PROCEDURE — 99291 CRITICAL CARE FIRST HOUR: CPT | Performed by: INTERNAL MEDICINE

## 2020-01-20 PROCEDURE — 71045 X-RAY EXAM CHEST 1 VIEW: CPT | Performed by: CLINICAL NURSE SPECIALIST

## 2020-01-20 RX ORDER — POTASSIUM CHLORIDE 1.5 G/1.77G
40 POWDER, FOR SOLUTION ORAL ONCE
Status: COMPLETED | OUTPATIENT
Start: 2020-01-20 | End: 2020-01-20

## 2020-01-20 RX ORDER — POTASSIUM CHLORIDE 1.5 G/1.77G
20 POWDER, FOR SOLUTION ORAL ONCE
Status: COMPLETED | OUTPATIENT
Start: 2020-01-20 | End: 2020-01-20

## 2020-01-20 NOTE — PROGRESS NOTES
[facility]    Cardiology - [facility]  Progress Note    Roxannedaniella Hauserlander Patient Status:  Inpatient    1940 MRN [id number]   Location [facility] 2W/SW Attending Avery Obrien Day # 10 PCP No primary care provider

## 2020-01-20 NOTE — RESPIRATORY THERAPY NOTE
Pt received on full vent support: AC 12/500/+5/40%. BS auscultated bilaterally. Suction provided as indicated. No acute respiratory events overnight. RT to continue to monitor.

## 2020-01-20 NOTE — PROGRESS NOTES
[facility]    Progress Note    Antonio Martins Patient Status:  Inpatient    1940 MRN [id number]   Location [facility] 2W/SW Attending Ramandeep Patel DO   Hosp Day # 10 PCP No primary care provider on file.      Subjec simple commands. Assessment/Plan:  S/P CABG x 3 POD # 6  Resp. Failure requiring full vent support. Currently on 40% Fio2.  Mgt per Intensivist. CXR reviewed by Pulm  Resp arrest/cardiac arrest (bradycardia-PEA- CPR) on 1/16 requiring intubation; suspici

## 2020-01-20 NOTE — PROGRESS NOTES
[facility] ID PROGRESS NOTE    Judith Sosa Patient Status:  Inpatient    1940 MRN [id number]   Location [facility] 2W/SW Attending Concetta Boateng Day # 10 PCP No primary care provider on file. Subjective:  Tmax 99.3.  Rem and then had to be revised due to dislocation in 2016. Per pt, since that time has had pain on and off, but more recently.  In Jan 2019 had a hip aspiration and this showed staph epi and pt was seen by another ID MD. At that time, no surgery done and pt neisha

## 2020-01-20 NOTE — PROGRESS NOTES
Patient overall has been improving since cardiopulmonary arrest last Thursday. He is off all pressors and oxygenating well at 40% FiO2. With sedation on hold he is awake alert neurologically intact.   He is beginning to diurese well with Bumex drip antonio

## 2020-01-20 NOTE — PROGRESS NOTES
[facility]    Progress Note    Che Lilian Patient Status:  Inpatient    1940 MRN [id number]   Location [facility] 2W/SW Attending Graeme Boxer, DO   Hosp Day # 10 PCP No primary care provider on file.        Subjec .0 01/20/2020    CREATSERUM 2.35 (H) 01/20/2020    BUN 51 (H) 01/20/2020     01/20/2020    K 3.3 (L) 01/20/2020     01/20/2020    CO2 28.0 01/20/2020     (H) 01/20/2020    CA 7.4 (L) 01/20/2020    ALB 1.7 (L) 01/19/2020    ALKPHO

## 2020-01-20 NOTE — PROGRESS NOTES
[facility] dosing service    Follow-up Pharmacokinetic Consult for Vancomycin Dosing     Che Quintana is a 78year old male who is currently receiving Vancomycin dosed by pharmacy based on random levels. He has No Known Allergies.     Current Anti PharmD  1/19/2020  8:37 PM  [id number]: [phone]

## 2020-01-20 NOTE — PLAN OF CARE
Mack soft wrist restraints remain on to keep pt from pulling out ett.      Problem: Safety Risk - Non-Violent Restraints  Goal: Patient will remain free from self-harm  Description  INTERVENTIONS:  - Apply the least restrictive restraint to prevent harm  - N intubated. Required less suctioning overnight. In no apparent distress. Poss breathing trial today.      Problem: RESPIRATORY - ADULT  Goal: Achieves optimal ventilation and oxygenation  Description  INTERVENTIONS:  - Assess for changes in respiratory statu tooth brush  - Limit straining and forceful nose blowing  Outcome: Progressing    See skin assessment. Betadine painted CABG incision sites. Midsternal & R leg incisions remain well approximated.       Problem: SKIN/TISSUE INTEGRITY - ADULT  Goal: Skin inte activity and pre-medicate as appropriate  Outcome: Progressing    Accuchecks remain <150. No sliding scale insulin coverage given overnight.      Problem: Diabetes/Glucose Control  Goal: Glucose maintained within prescribed range  Description  INTERVENTIONS

## 2020-01-20 NOTE — PROGRESS NOTES
[facility]    Progress Note    Brenda Ray Patient Status:  Inpatient    1940 MRN [id number]   Location [facility] 2W/SW Attending Kelsie Mcbride DO   Hosp Day # 10 PCP No primary care provider on file.         Sub small left effusion   Vent management / full support - still not stable to extubate      4- severe pulmonary HTN   PAP 67 / no RV strain on echo      5- acute Kidney injury   I/O ++ 15 L with good UO   Renal following         6- anemia / hgb now stable   S since the prior exams. Moderate interstitial edema, unchanged. Dictated by (CST): Jose Carrero MD on 1/19/2020 at 9:07     Approved by (CST): Jose Carrero MD on 1/19/2020 at 9:13                       Maria E Hayes.  Prince Lafleur MD  1/20/2020

## 2020-01-21 ENCOUNTER — APPOINTMENT (OUTPATIENT)
Dept: GENERAL RADIOLOGY | Facility: HOSPITAL | Age: 80
DRG: 003 | End: 2020-01-21
Attending: INTERNAL MEDICINE
Payer: MEDICARE

## 2020-01-21 ENCOUNTER — APPOINTMENT (OUTPATIENT)
Dept: CT IMAGING | Facility: HOSPITAL | Age: 80
DRG: 003 | End: 2020-01-21
Attending: CLINICAL NURSE SPECIALIST
Payer: MEDICARE

## 2020-01-21 ENCOUNTER — APPOINTMENT (OUTPATIENT)
Dept: INTERVENTIONAL RADIOLOGY/VASCULAR | Facility: HOSPITAL | Age: 80
DRG: 003 | End: 2020-01-21
Attending: CLINICAL NURSE SPECIALIST
Payer: MEDICARE

## 2020-01-21 LAB
ALBUMIN SERPL-MCNC: 1.6 G/DL (ref 3.4–5)
ANION GAP SERPL CALC-SCNC: 6 MMOL/L (ref 0–18)
APTT PPP: 52.1 SECONDS (ref 23.2–35.3)
BASOPHILS # BLD: 0 X10(3) UL (ref 0–0.2)
BASOPHILS NFR BLD: 0 %
BUN BLD-MCNC: 60 MG/DL (ref 7–18)
BUN/CREAT SERPL: 22.7 (ref 10–20)
CALCIUM BLD-MCNC: 7.5 MG/DL (ref 8.5–10.1)
CHLORIDE SERPL-SCNC: 105 MMOL/L (ref 98–112)
CK SERPL-CCNC: 101 U/L (ref 39–308)
CO2 SERPL-SCNC: 30 MMOL/L (ref 21–32)
CREAT BLD-MCNC: 2.64 MG/DL (ref 0.7–1.3)
DEPRECATED RDW RBC AUTO: 57.9 FL (ref 35.1–46.3)
DEPRECATED RDW RBC AUTO: 58.8 FL (ref 35.1–46.3)
EOSINOPHIL # BLD: 0.68 X10(3) UL (ref 0–0.7)
EOSINOPHIL NFR BLD: 5 %
ERYTHROCYTE [DISTWIDTH] IN BLOOD BY AUTOMATED COUNT: 17.6 % (ref 11–15)
ERYTHROCYTE [DISTWIDTH] IN BLOOD BY AUTOMATED COUNT: 17.7 % (ref 11–15)
GLUCOSE BLD-MCNC: 159 MG/DL (ref 70–99)
GLUCOSE BLDC GLUCOMTR-MCNC: 122 MG/DL (ref 70–99)
GLUCOSE BLDC GLUCOMTR-MCNC: 124 MG/DL (ref 70–99)
GLUCOSE BLDC GLUCOMTR-MCNC: 157 MG/DL (ref 70–99)
HCT VFR BLD AUTO: 25.9 % (ref 39–53)
HCT VFR BLD AUTO: 27.9 % (ref 39–53)
HGB BLD-MCNC: 8.1 G/DL (ref 13–17.5)
HGB BLD-MCNC: 8.9 G/DL (ref 13–17.5)
LYMPHOCYTES NFR BLD: 1.08 X10(3) UL (ref 1–4)
LYMPHOCYTES NFR BLD: 8 %
MCH RBC QN AUTO: 28.4 PG (ref 26–34)
MCH RBC QN AUTO: 29.1 PG (ref 26–34)
MCHC RBC AUTO-ENTMCNC: 31.3 G/DL (ref 31–37)
MCHC RBC AUTO-ENTMCNC: 31.9 G/DL (ref 31–37)
MCV RBC AUTO: 90.9 FL (ref 80–100)
MCV RBC AUTO: 91.2 FL (ref 80–100)
METAMYELOCYTES # BLD: 0.27 X10(3) UL
METAMYELOCYTES NFR BLD: 2 %
MONOCYTES # BLD: 0.95 X10(3) UL (ref 0.1–1)
MONOCYTES NFR BLD: 7 %
NEUTROPHILS # BLD AUTO: 9.56 X10 (3) UL (ref 1.5–7.7)
NEUTROPHILS NFR BLD: 69 %
NEUTS BAND NFR BLD: 9 %
NEUTS HYPERSEG # BLD: 10.53 X10(3) UL (ref 1.5–7.7)
NRBC BLD MANUAL-RTO: 1 %
OSMOLALITY SERPL CALC.SUM OF ELEC: 312 MOSM/KG (ref 275–295)
PHOSPHATE SERPL-MCNC: 3.3 MG/DL (ref 2.5–4.9)
PLATELET # BLD AUTO: 268 10(3)UL (ref 150–450)
PLATELET # BLD AUTO: 325 10(3)UL (ref 150–450)
PLATELET MORPHOLOGY: NORMAL
POTASSIUM SERPL-SCNC: 3.6 MMOL/L (ref 3.5–5.1)
RBC # BLD AUTO: 2.85 X10(6)UL (ref 3.8–5.8)
RBC # BLD AUTO: 3.06 X10(6)UL (ref 3.8–5.8)
SODIUM SERPL-SCNC: 141 MMOL/L (ref 136–145)
TOTAL CELLS COUNTED: 100
WBC # BLD AUTO: 13.5 X10(3) UL (ref 4–11)
WBC # BLD AUTO: 16.1 X10(3) UL (ref 4–11)

## 2020-01-21 PROCEDURE — 71250 CT THORAX DX C-: CPT | Performed by: CLINICAL NURSE SPECIALIST

## 2020-01-21 PROCEDURE — 0W9B30Z DRAINAGE OF LEFT PLEURAL CAVITY WITH DRAINAGE DEVICE, PERCUTANEOUS APPROACH: ICD-10-PCS | Performed by: RADIOLOGY

## 2020-01-21 PROCEDURE — 71045 X-RAY EXAM CHEST 1 VIEW: CPT | Performed by: INTERNAL MEDICINE

## 2020-01-21 PROCEDURE — 99291 CRITICAL CARE FIRST HOUR: CPT | Performed by: INTERNAL MEDICINE

## 2020-01-21 RX ORDER — POTASSIUM CHLORIDE 1.5 G/1.77G
40 POWDER, FOR SOLUTION ORAL ONCE
Status: COMPLETED | OUTPATIENT
Start: 2020-01-21 | End: 2020-01-21

## 2020-01-21 RX ORDER — LIDOCAINE HYDROCHLORIDE 20 MG/ML
INJECTION, SOLUTION EPIDURAL; INFILTRATION; INTRACAUDAL; PERINEURAL
Status: COMPLETED
Start: 2020-01-21 | End: 2020-01-21

## 2020-01-21 NOTE — PROGRESS NOTES
[facility]    Progress Note    Franky Villagran Patient Status:  Inpatient    1940 MRN [id number]   Location [facility] 2W/SW Attending Yolanda Green,    Hosp Day # 11 PCP No primary care provider on file.         Sub respiratory failure   Now on vent with 40 % Fio2 with 99 % O2 sat   CXR with worse left sided affusion   Chest ct reviewed : large left pleural effusion and small to moderate right effusion     Plan :   IR to drain left effusion and placement of small left There are presumed epicardial electrodes overlying the right atrium and right ventricle. There is associated moderate volume hemopericardium along the right heart margin with foci of intrinsic gas.   Hyperdense pericardial collection measures up to 1.6 cm interval change. Stable cardiomegaly with persistent vascular congestion and small left pleural effusion.      Dictated by (CST): Greg Whelan MD on 1/20/2020 at 7:10     Approved by (CST): Greg Whelan MD on 1/20/2020 at 7:12

## 2020-01-21 NOTE — PROGRESS NOTES
[facility] ID PROGRESS NOTE    Melly Vizcaino Patient Status:  Inpatient    1940 MRN [id number]   Location [facility] 2W/SW Attending Concetta Boateng Day # 11 PCP No primary care provider on file. Subjective:  Afebrile.  40% and then had to be revised due to dislocation in 2016. Per pt, since that time has had pain on and off, but more recently.  In Jan 2019 had a hip aspiration and this showed staph epi and pt was seen by another ID MD. At that time, no surgery done and pt neisha

## 2020-01-21 NOTE — PROGRESS NOTES
[facility]    Progress Note    Shemar Atkins Patient Status:  Inpatient    1940 MRN [id number]   Location [facility] 2W/SW Attending Nuvia Gordon DO   Hosp Day # 11 PCP No primary care provider on file.        Subjec Lab 01/19/20  0509 01/20/20  0434 01/20/20  0944 01/20/20  1848 01/21/20  0455   * 140*  --   --  159*   BUN 47* 51*  --   --  60*   CREATSERUM 2.19* 2.35*  --   --  2.64*   GFRAA 31* 29*  --   --  25*   GFRNAA 27* 25*  --   --  22*   CA 7.3* 7.4*

## 2020-01-21 NOTE — PROGRESS NOTES
[facility] Pharmacy Note:  Renal Adjustment for cefepime (MAXIPIME)    Jorge Sena is a 78year old male who has been prescribed cefepime (MAXIPIME) 2000 mg every 12 hrs.   CrCl is estimated creatinine clearance is 19.8 mL/min (A) (based on SCr of 2.64 mg/dL

## 2020-01-21 NOTE — PROGRESS NOTES
[facility]    Progress Note    Cynthia Wade Patient Status:  Inpatient    1940 MRN [id number]   Location [facility] 2W/SW Attending Sandie Matt,    Hosp Day # 11 PCP No primary care provider on file.      Subjec arousable    Assessment/Plan:  S/P CABG x 3 POD # 7  Resp. Failure requiring full vent support. Currently on 40% Fio2. Mgt per Intensivist. CXR reviewed w/Surgeon. May benefit from thoracentesis vs IR catheter placement for effusion.  Pulmonary notified of planning: rehab. SW involved with planning.     Addendum @ 0702:   Discussed CXR w/Pulm. Plan for CT chest (non-contrast) now and IR pigtail catheter placement if necessary. Informed IR APN of plan. Will need to stop IV Heparin 6 hours prior to placement.

## 2020-01-21 NOTE — WOUND PROGRESS NOTE
Specialty Bed  Nursing consult for specialty bed, the pt. is s/p CABG, intubated, the nurse is at the bedside, the pt. will have a CT scan today. Rainer Score is 14 he is at risk for skin breakdown.  Spoke with the pt's nurse and ordered the pt. a Stephanie a

## 2020-01-21 NOTE — PLAN OF CARE
Maintained for safety.      Problem: Safety Risk - Non-Violent Restraints  Goal: Patient will remain free from self-harm  Description  INTERVENTIONS:  - Apply the least restrictive restraint to prevent harm  - Notify patient and family of reasons restraints

## 2020-01-21 NOTE — DIETARY NOTE
ADULT NUTRITION REASSESSMENT     Pt is at high nutrition risk. Pt does not meet malnutrition criteria. RECOMMENDATIONS TO MD:  See Nutrition Intervention for EN rx. EN order set in place.       NUTRITION DIAGNOSIS/PROBLEM:  Inadequate protein energy provider: monitor plans        PERTINENT PAST MEDICAL HISTORY:   Past Medical History:   Diagnosis Date   • Anesthesia complication     post anesthesia dementia   • Brain injury ([facility] [address] 03/2018    subdural hematoma   • Depression    • Hearing impairment    • 1/22/2020] cefepime  2 g Intravenous Q24H   • DAPTOmycin  6 mg/kg (Adjusted) Intravenous Q48H   • artificial tears   Both Eyes TID   • metRONIDAZOLE  500 mg Intravenous Q8H   • sodium chloride   Intravenous Once   • ferrous sulfate  300 mg Oral TID   • Ins 01/20/20  1848 01/21/20  0455   * 137* 140*  --   --  159*   BUN 44* 47* 51*  --   --  60*   CREATSERUM 2.24* 2.19* 2.35*  --   --  2.64*   CA 7.3* 7.3* 7.4*  --   --  7.5*   MG 2.6  --  2.3  --   --   --     138 140  --   --  141   K 3.8 3.4*

## 2020-01-21 NOTE — PROGRESS NOTES
[facility]    Cardiology - [facility]  Progress Note    Ethyl Frame Patient Status:  Inpatient    1940 MRN [id number]   Location [facility] 2W/SW Attending Allie Ryan Day # 11 PCP No primary care provider

## 2020-01-21 NOTE — BRIEF PROCEDURE NOTE
[facility]  Procedure Note    Eda Castillo Patient Status:  Inpatient    1940 MRN [id number]   Location [facility] Attending Moira Walsh, [facility] [address] Day # 11 PCP No primary care provider on

## 2020-01-21 NOTE — PLAN OF CARE
Problem: Diabetes/Glucose Control  Goal: Glucose maintained within prescribed range  Description  INTERVENTIONS:  - Monitor Blood Glucose as ordered  - Assess for signs and symptoms of hyperglycemia and hypoglycemia  - Administer ordered medications to m Administer supportive blood products/factors, fluids and medications as ordered and appropriate  - Administer supportive blood products/factors as ordered and appropriate  Outcome: Progressing  Note:   No apparent bleeding.  Hgb stable this am.  area of s and initiate nutrition consult as needed  - Instruct patient on self management of diabetes  Outcome: Progressing     Problem: METABOLIC  Goal: Electrolytes maintained within normal limits  Description  INTERVENTIONS:  - Monitor labs and rhythm and assess

## 2020-01-22 ENCOUNTER — APPOINTMENT (OUTPATIENT)
Dept: GENERAL RADIOLOGY | Facility: HOSPITAL | Age: 80
DRG: 003 | End: 2020-01-22
Attending: INTERNAL MEDICINE
Payer: MEDICARE

## 2020-01-22 LAB
ALBUMIN SERPL-MCNC: 1.7 G/DL (ref 3.4–5)
ANION GAP SERPL CALC-SCNC: 5 MMOL/L (ref 0–18)
APTT PPP: 41.1 SECONDS (ref 23.2–35.3)
APTT PPP: 43.9 SECONDS (ref 23.2–35.3)
BASE EXCESS BLD CALC-SCNC: 8.6 MMOL/L (ref ?–2)
BASOPHILS # BLD: 0 X10(3) UL (ref 0–0.2)
BASOPHILS NFR BLD: 0 %
BUN BLD-MCNC: 67 MG/DL (ref 7–18)
BUN/CREAT SERPL: 23.6 (ref 10–20)
CA-I BLD-SCNC: 1.07 MMOL/L (ref 0.95–1.32)
CALCIUM BLD-MCNC: 7.9 MG/DL (ref 8.5–10.1)
CHLORIDE SERPL-SCNC: 102 MMOL/L (ref 98–112)
CO2 SERPL-SCNC: 33 MMOL/L (ref 21–32)
COHGB MFR BLD: 2.3 % (ref 0–1.5)
CREAT BLD-MCNC: 2.84 MG/DL (ref 0.7–1.3)
DEPRECATED RDW RBC AUTO: 58.6 FL (ref 35.1–46.3)
EOSINOPHIL # BLD: 0.9 X10(3) UL (ref 0–0.7)
EOSINOPHIL NFR BLD: 6 %
ERYTHROCYTE [DISTWIDTH] IN BLOOD BY AUTOMATED COUNT: 17.7 % (ref 11–15)
GLUCOSE BLD-MCNC: 152 MG/DL (ref 70–99)
GLUCOSE BLDC GLUCOMTR-MCNC: 132 MG/DL (ref 70–99)
GLUCOSE BLDC GLUCOMTR-MCNC: 152 MG/DL (ref 70–99)
GLUCOSE BLDC GLUCOMTR-MCNC: 159 MG/DL (ref 70–99)
GLUCOSE BLDC GLUCOMTR-MCNC: 169 MG/DL (ref 70–99)
GLUCOSE BLDC GLUCOMTR-MCNC: 191 MG/DL (ref 70–99)
HAV IGM SER QL: 2.5 MG/DL (ref 1.6–2.6)
HCO3 BLDA-SCNC: 31.6 MEQ/L (ref 21–27)
HCT VFR BLD AUTO: 26.9 % (ref 39–53)
HGB BLD-MCNC: 8.6 G/DL (ref 13–17.5)
HGB BLD-MCNC: 9.3 G/DL (ref 13.5–17.5)
LACTIC ACID (BLOOD GAS): 1.1 MMOL/L (ref 0.5–2.2)
LYMPHOCYTES NFR BLD: 1.95 X10(3) UL (ref 1–4)
LYMPHOCYTES NFR BLD: 13 %
MCH RBC QN AUTO: 29.3 PG (ref 26–34)
MCHC RBC AUTO-ENTMCNC: 32 G/DL (ref 31–37)
MCV RBC AUTO: 91.5 FL (ref 80–100)
METHGB MFR BLD: 0.4 % SAT (ref 0.4–1.5)
MONOCYTES # BLD: 0.9 X10(3) UL (ref 0.1–1)
MONOCYTES NFR BLD: 6 %
NEUTROPHILS # BLD AUTO: 10.59 X10 (3) UL (ref 1.5–7.7)
NEUTROPHILS NFR BLD: 73 %
NEUTS BAND NFR BLD: 2 %
NEUTS HYPERSEG # BLD: 11.25 X10(3) UL (ref 1.5–7.7)
O2 CT BLD-SCNC: 12.7 VOL% (ref 15–23)
O2/TOTAL GAS SETTING VFR VENT: 40 %
OSMOLALITY SERPL CALC.SUM OF ELEC: 312 MOSM/KG (ref 275–295)
PCO2 BLDA: 43 MM HG (ref 35–45)
PEEP SETTING VENT: 5 CM H2O
PH BLDA: 7.49 [PH] (ref 7.35–7.45)
PHOSPHATE SERPL-MCNC: 3.6 MG/DL (ref 2.5–4.9)
PLATELET # BLD AUTO: 349 10(3)UL (ref 150–450)
PLATELET MORPHOLOGY: NORMAL
PO2 BLDA: 97 MM HG (ref 80–100)
POTASSIUM (BLOOD GAS): 3.3 MMOL/L (ref 3.3–5.1)
POTASSIUM SERPL-SCNC: 3.6 MMOL/L (ref 3.5–5.1)
PUNCTURE CHARGE: NO
RBC # BLD AUTO: 2.94 X10(6)UL (ref 3.8–5.8)
RESP RATE: 12 BPM
SAO2 % BLDA: 98.8 % (ref 94–100)
SODIUM (BLOOD GAS): 137 MMOL/L (ref 135–145)
SODIUM SERPL-SCNC: 140 MMOL/L (ref 136–145)
SPECIMEN VOL 24H UR: 500 ML
TOTAL CELLS COUNTED: 100
WBC # BLD AUTO: 15 X10(3) UL (ref 4–11)

## 2020-01-22 PROCEDURE — 71045 X-RAY EXAM CHEST 1 VIEW: CPT | Performed by: INTERNAL MEDICINE

## 2020-01-22 PROCEDURE — 99291 CRITICAL CARE FIRST HOUR: CPT | Performed by: INTERNAL MEDICINE

## 2020-01-22 RX ORDER — SODIUM BICARBONATE 650 MG/1
325 TABLET ORAL AS NEEDED
Status: DISCONTINUED | OUTPATIENT
Start: 2020-01-22 | End: 2020-02-04

## 2020-01-22 NOTE — PROGRESS NOTES
[facility] ID PROGRESS NOTE    Helen Adriana Patient Status:  Inpatient    1940 MRN [id number]   Location [facility] 2W/SW Attending Concetta Boateng Day # 12 PCP No primary care provider on file.      Subjective:  Remains intuba B shouilders and knees. Had L THR originally in 2015 and then had to be revised due to dislocation in 2016. Per pt, since that time has had pain on and off, but more recently.  In Jan 2019 had a hip aspiration and this showed staph epi and pt was seen by an

## 2020-01-22 NOTE — PROGRESS NOTES
[facility]    Progress Note    Ethyl Frame Patient Status:  Inpatient    1940 MRN [id number]   Location [facility] 2W/SW Attending Mart Leonard,    Hosp Day # 12 PCP No primary care provider on file.         Sub to extubate in 1-2 days if tolerate     4- left pleural effusion   Post CABG bloody fluid / s/p small left sided pigtail ct placement 1/21/2020   Drained 1200 cc / less drainage now   CXR much better      5- severe pulmonary HTN   PAP 67 / no RV strain on be postprocedural in nature. 2. Moderate to large left and moderate right pleural effusions. Apparent dependent hyperdensity within the left pleural effusion is favored to be related to adjacent streak artifacts rather than intrinsic hemorrhagic products. 1/21/2020  CONCLUSION:  1. Ultrasound and fluoroscopic guided percutaneous placement of small bore chest tube for drainage of hemothorax.     Dictated by (CST): Thierry Matias MD on 1/21/2020 at 18:07     Approved by (CST): Thierry Matias MD on 1/21/2020

## 2020-01-22 NOTE — PLAN OF CARE
Problem: Patient/Family Goals  Goal: Patient/Family Long Term Goal  Description  Patient's Long Term Goal: \"To be able to get back home and walking\"    Interventions:  - Patient will need to discharge to rehab when cleared for further strength and mobi comfort level or patient's stated pain goal  Description  INTERVENTIONS:  - Encourage pt to monitor pain and request assistance  - Assess pain using appropriate pain scale  - Administer analgesics based on type and severity of pain and evaluate response  - factors for pressure ulcer development  - Assess and document skin integrity  - Monitor for areas of redness and/or skin breakdown  - Initiate interventions, skin care algorithm/standards of care as needed  Outcome: Progressing     Problem: HEMATOLOGIC - A Continuous cardiac monitoring, monitor vital signs, obtain 12 lead EKG if indicated  - Evaluate effectiveness of antiarrhythmic and heart rate control medications as ordered  - Initiate emergency measures for life threatening arrhythmias  - Monitor electro appropriate  Outcome: Progressing     Problem: Skin/tissue integrity  Goal: Incisions, wounds, or drain sites healing without s/s of infection  Description  INTERVENTIONS:  - Assess and document skin integrity  - Administer medications as ordered  - Assess of reasons restraints applied  - Assess for any contributing factors to confusion (electrolyte disturbances, delirium, medications)  - Discontinue any unnecessary medical devices as soon as possible  - Assess the patient's physical comfort, circulation, sk

## 2020-01-22 NOTE — PROGRESS NOTES
[facility]    Cardiology - [facility]  Progress Note    Reshma Fontanez Patient Status:  Inpatient    1940 MRN [id number]   Location [facility] 2W/SW Attending Pipe Downing Day # 12 PCP No primary care provider

## 2020-01-22 NOTE — PROGRESS NOTES
[place] [facility] - [facility]    Brief Note    Helen Adriana Patient Status:  Inpatient    1940 MRN [id number]   Location [facility] 2W/SW Attending Marquis Sinclair DO   Hosp Day # 12 PCP No primary care provider on file.      Date of Not

## 2020-01-22 NOTE — PLAN OF CARE
Restraints maintained.      Problem: Safety Risk - Non-Violent Restraints  Goal: Patient will remain free from self-harm  Description  INTERVENTIONS:  - Apply the least restrictive restraint to prevent harm  - Notify patient and family of reasons restraints

## 2020-01-22 NOTE — PROGRESS NOTES
[facility]    Progress Note    Ethyl Frame Patient Status:  Inpatient    1940 MRN [id number]   Location [facility] 2W/SW Attending Mart Leonard,    Hosp Day # 12 PCP No primary care provider on file.        Subjec 8.6 mg Oral BID   • aspirin  81 mg Oral Daily   • Amiodarone HCl  200 mg Oral TID CC   • vancomycin HCl  125 mg Oral BID   • DULoxetine HCl  30 mg Oral Daily   • OCUVITE-LUTEIN  1 tablet Oral Daily   • [MAR Hold] acetaminophen  650 mg Oral Once   • [facility] Ho bypass grafting. There are presumed epicardial electrodes overlying the right atrium and right ventricle. There is associated moderate volume hemopericardium along the right heart margin with foci of intrinsic gas.   Hyperdense pericardial collection sarah significant interval change. Stable cardiomegaly with persistent vascular congestion and small left pleural effusion.      Dictated by (CST): Sheba Parks MD on 1/20/2020 at 7:10     Approved by (CST): Sheba Parks MD on 1/20/2020 at 7:12 overload.   Continues hemodynamically stable off vasoactive medications.  Continue ICU status today.   Carotid stenosis noted on pre op U/S= >70% on right. Pt. Aware & asymptomatic. Plan for further evaluation during post op follow up appt.   Pt.  Currently

## 2020-01-22 NOTE — RESPIRATORY THERAPY NOTE
Pt started SBT trial settings PS 10  PEEP 5  FIO2 40%  Pt tolerating well, RT will continue to monitor. 10:10 Pt back on Full Support due to increased work of breathing.

## 2020-01-22 NOTE — PROGRESS NOTES
[facility]    Progress Note    Marvene Lilian Patient Status:  Inpatient    1940 MRN [id number]   Location [facility] 2W/SW Attending Graeme Boxer, DO   Hosp Day # 12 PCP No primary care provider on file.        Subjec 01/22/2020    CO2 33.0 (H) 01/22/2020     (H) 01/22/2020    CA 7.9 (L) 01/22/2020    ALB 1.7 (L) 01/22/2020    ALKPHO 115 01/18/2020    BILT 0.5 01/18/2020    TP 5.1 (L) 01/18/2020    AST 41 (H) 01/18/2020    ALT 21 01/18/2020    PTT 43.9 (H) 01/22/ Ap Portable  (cpt=71045)    Result Date: 1/22/2020  CONCLUSION:  1. Significant improvement in the left lung aeration related to interval placement of chest tube and removal of pleural fluid.   Small residual left basilar pleural effusion with compressive a

## 2020-01-23 ENCOUNTER — APPOINTMENT (OUTPATIENT)
Dept: GENERAL RADIOLOGY | Facility: HOSPITAL | Age: 80
DRG: 003 | End: 2020-01-23
Attending: INTERNAL MEDICINE
Payer: MEDICARE

## 2020-01-23 ENCOUNTER — APPOINTMENT (OUTPATIENT)
Dept: GENERAL RADIOLOGY | Facility: HOSPITAL | Age: 80
DRG: 003 | End: 2020-01-23
Attending: CLINICAL NURSE SPECIALIST
Payer: MEDICARE

## 2020-01-23 LAB
ALBUMIN SERPL-MCNC: 1.6 G/DL (ref 3.4–5)
ANION GAP SERPL CALC-SCNC: 7 MMOL/L (ref 0–18)
ANTIBODY SCREEN: NEGATIVE
APTT PPP: 49.8 SECONDS (ref 23.2–35.3)
BASOPHILS # BLD: 0 X10(3) UL (ref 0–0.2)
BASOPHILS NFR BLD: 0 %
BUN BLD-MCNC: 79 MG/DL (ref 7–18)
BUN/CREAT SERPL: 24.8 (ref 10–20)
CALCIUM BLD-MCNC: 8.1 MG/DL (ref 8.5–10.1)
CHLORIDE SERPL-SCNC: 100 MMOL/L (ref 98–112)
CO2 SERPL-SCNC: 35 MMOL/L (ref 21–32)
CREAT BLD-MCNC: 3.19 MG/DL (ref 0.7–1.3)
DEPRECATED RDW RBC AUTO: 57.8 FL (ref 35.1–46.3)
EOSINOPHIL # BLD: 0.71 X10(3) UL (ref 0–0.7)
EOSINOPHIL NFR BLD: 5 %
ERYTHROCYTE [DISTWIDTH] IN BLOOD BY AUTOMATED COUNT: 17.7 % (ref 11–15)
GLUCOSE BLD-MCNC: 149 MG/DL (ref 70–99)
GLUCOSE BLDC GLUCOMTR-MCNC: 141 MG/DL (ref 70–99)
GLUCOSE BLDC GLUCOMTR-MCNC: 151 MG/DL (ref 70–99)
GLUCOSE BLDC GLUCOMTR-MCNC: 166 MG/DL (ref 70–99)
GLUCOSE BLDC GLUCOMTR-MCNC: 172 MG/DL (ref 70–99)
HAV IGM SER QL: 2.6 MG/DL (ref 1.6–2.6)
HCT VFR BLD AUTO: 24.9 % (ref 39–53)
HGB BLD-MCNC: 7.9 G/DL (ref 13–17.5)
LYMPHOCYTES NFR BLD: 1.85 X10(3) UL (ref 1–4)
LYMPHOCYTES NFR BLD: 13 %
MCH RBC QN AUTO: 28.9 PG (ref 26–34)
MCHC RBC AUTO-ENTMCNC: 31.7 G/DL (ref 31–37)
MCV RBC AUTO: 91.2 FL (ref 80–100)
MONOCYTES # BLD: 1.28 X10(3) UL (ref 0.1–1)
MONOCYTES NFR BLD: 9 %
NEUTROPHILS # BLD AUTO: 10.01 X10 (3) UL (ref 1.5–7.7)
NEUTROPHILS NFR BLD: 70 %
NEUTS BAND NFR BLD: 3 %
NEUTS HYPERSEG # BLD: 10.37 X10(3) UL (ref 1.5–7.7)
OSMOLALITY SERPL CALC.SUM OF ELEC: 320 MOSM/KG (ref 275–295)
PHOSPHATE SERPL-MCNC: 3.8 MG/DL (ref 2.5–4.9)
PLATELET # BLD AUTO: 332 10(3)UL (ref 150–450)
PLATELET MORPHOLOGY: NORMAL
POTASSIUM SERPL-SCNC: 3.2 MMOL/L (ref 3.5–5.1)
RBC # BLD AUTO: 2.73 X10(6)UL (ref 3.8–5.8)
RH BLOOD TYPE: POSITIVE
SODIUM SERPL-SCNC: 142 MMOL/L (ref 136–145)
TOTAL CELLS COUNTED: 100
WBC # BLD AUTO: 14.2 X10(3) UL (ref 4–11)

## 2020-01-23 PROCEDURE — 71045 X-RAY EXAM CHEST 1 VIEW: CPT | Performed by: INTERNAL MEDICINE

## 2020-01-23 PROCEDURE — 71045 X-RAY EXAM CHEST 1 VIEW: CPT | Performed by: CLINICAL NURSE SPECIALIST

## 2020-01-23 PROCEDURE — 99291 CRITICAL CARE FIRST HOUR: CPT | Performed by: INTERNAL MEDICINE

## 2020-01-23 RX ORDER — METRONIDAZOLE 500 MG/100ML
500 INJECTION, SOLUTION INTRAVENOUS EVERY 8 HOURS
Status: COMPLETED | OUTPATIENT
Start: 2020-01-23 | End: 2020-01-23

## 2020-01-23 RX ORDER — POTASSIUM CHLORIDE 20 MEQ/1
20 TABLET, EXTENDED RELEASE ORAL ONCE
Status: COMPLETED | OUTPATIENT
Start: 2020-01-23 | End: 2020-01-23

## 2020-01-23 RX ORDER — SODIUM CHLORIDE 9 MG/ML
INJECTION, SOLUTION INTRAVENOUS ONCE
Status: COMPLETED | OUTPATIENT
Start: 2020-01-23 | End: 2020-01-23

## 2020-01-23 NOTE — PROGRESS NOTES
Pulmonary/Critical Care Follow Up Note    HPI:   Demetrio Choudhary is a 78year old male with No chief complaint on file. PCP No primary care provider on file.   Admission Attending Kathy Drake 15 Day #13    Awake and follows commands Continuous  •  dextrose 10 % infusion, , Intravenous, Continuous PRN  •  0.9% NaCl infusion, , Intravenous, Once  •  ipratropium-albuterol (DUONEB) nebulizer solution 3 mL, 3 mL, Nebulization, Q6H PRN  •  HYDROcodone-acetaminophen (NORCO) 5-325 MG per tab injection 4 mg, 4 mg, Intravenous, Q6H PRN  •  potassium chloride IVPB premix 20 mEq, 20 mEq, Intravenous, PRN **OR** potassium chloride IVPB premix 40 mEq, 40 mEq, Intravenous, PRN  •  Magnesium Sulfate in D5W IVPB premix 1 g, 1 g, Intravenous, PRN  •  Ma m), weight 225 lb (102.1 kg), SpO2 98 %.     Intake/Output Summary (Last 24 hours) at 1/23/2020 1013  Last data filed at 1/23/2020 0800  Gross per 24 hour   Intake 2530 ml   Output 3791 ml   Net -1261 ml     Intubated  Awake and follows commands  Lung dec b variation  CXR stable and overall improved  Plan SBT today   May need trach        ZEYAD   Plan CPAP or NIV after extubation      Prior h/o traumatic subdural hematoma 2018  Stable      Anemia   Post-op / hip revision  No clinical bleeding  Hb down again  Pl

## 2020-01-23 NOTE — PROGRESS NOTES
[facility]    Cardiology - [facility]  Progress Note    Jairo Blend Patient Status:  Inpatient    1940 MRN [id number]   Location [facility] 2W/SW Attending Vitaliy Togolese Day # 15 PCP No primary care provider Lab Results   Component Value Date    WBC 14.2 01/23/2020    HGB 7.9 01/23/2020    HCT 24.9 01/23/2020    .0 01/23/2020    CREATSERUM 3.19 01/23/2020    BUN 79 01/23/2020     01/23/2020    K 3.2 01/23/2020     01/23/2020    CO2 35.0

## 2020-01-23 NOTE — PROGRESS NOTES
[facility]    Progress Note    Ellie Dsouza Patient Status:  Inpatient    1940 MRN [id number]   Location [facility] 2W/SW Attending Yannick Mdadox,    Hosp Day # 15 PCP No primary care provider on file.        Subjec 325.0 349.0 332.0         Recent Labs   Lab 01/21/20  0455 01/22/20  0551 01/23/20  0432   * 152* 149*   BUN 60* 67* 79*   CREATSERUM 2.64* 2.84* 3.19*   GFRAA 25* 23* 20*   GFRNAA 22* 20* 17*   CA 7.5* 7.9* 8.1*    140 142   K 3.6 3.6 3.2* lower lung related to decrease in pleural effusion and atelectasis. Left-sided chest tube remains in place.  2. Slight improvement in aeration in the right lung base with small amount of residual atelectasis probably reflecting a small sub pulmonic effusio Portable  (cpt=71045)    Result Date: 1/22/2020  CONCLUSION:  1. Slightly increased right perihilar opacification as compared to prior study.  2. No significant change in the appearance of small left basilar pleural effusion and mild left lower lung opacifi

## 2020-01-23 NOTE — RESPIRATORY THERAPY NOTE
Pt received on full vent support: AC 12/500/+5/40%. BS bilaterally auscultated and suction provided as indicated. No changes made overnight. RT to continue to monitor.

## 2020-01-23 NOTE — PROGRESS NOTES
[facility] ID PROGRESS NOTE    Melly Vizcaino Patient Status:  Inpatient    1940 MRN [id number]   Location [facility] 2W/SW Attending Concetta Boatengissa Day # 15 PCP No primary care provider on file.      Subjective:  Remains intuba gavin and knees. Had L THR originally in 2015 and then had to be revised due to dislocation in 2016. Per pt, since that time has had pain on and off, but more recently.  In Jan 2019 had a hip aspiration and this showed staph epi and pt was seen by king

## 2020-01-23 NOTE — PROGRESS NOTES
[facility]  Progress Note    Franky Villagran Patient Status:  Inpatient    1940 MRN [id number]   Location [facility] 2W/SW Attending Yolanda Green,    Hosp Day # 15 PCP No primary care provider on file.        Subjecti Jennifer Reyes MD on 1/22/2020 at 14:15     Approved by (CST): Ramila Clayton MD on 1/22/2020 at 14:20          Xr Chest Ap Portable  (cpt=71045)    Result Date: 1/22/2020  CONCLUSION:  1.  Feeding tube traverses right mainstem bronchus and lower lobe bronchus and atelectasis-unchanged. 3. Support devices remain in place.     Dictated by (CST): Nancy Sandhu MD on 1/22/2020 at 7:43     Approved by (CST): Nancy Sandhu MD on 1/22/2020 at 7:51              Assessment and Plan:   Chest tube:  No further output thus af

## 2020-01-23 NOTE — PLAN OF CARE
Problem: Diabetes/Glucose Control  Goal: Glucose maintained within prescribed range  Description  INTERVENTIONS:  - Monitor Blood Glucose as ordered  - Assess for signs and symptoms of hyperglycemia and hypoglycemia  - Administer ordered medications to m assist with strengthening/mobility  - Encourage toileting schedule  Outcome: Progressing     Problem: GASTROINTESTINAL - ADULT  Goal: Minimal or absence of nausea and vomiting  Description  INTERVENTIONS:  - Maintain adequate hydration with IV or PO as ord Limit straining and forceful nose blowing  Outcome: Not Progressing    Monitor shows SB 1st Avblock. Remains on Precedex gtt @ 0.4mcg/kg/min.      Problem: CARDIOVASCULAR - ADULT  Goal: Maintains optimal cardiac output and hemodynamic stability  Description Manage/alleviate anxiety  - Monitor for signs/symptoms of CO2 retention  Outcome: Not Progressing     Problem: METABOLIC  Goal: Glucose maintained within prescribed range  Description  INTERVENTIONS:  - Monitor Blood Glucose as ordered  - Assess for signs needed  Outcome: Progressing     Problem: Musculoskeletal  Goal: Return mobility to safest level of function  Description  INTERVENTIONS:  - Assess patient stability and activity tolerance for standing, transferring, and ambulating with or without assistiv

## 2020-01-23 NOTE — PLAN OF CARE
Very calm and cooperative off Precedex since early this morning. Restraints off intermittently. Has communicated  he does not feel confident not to have restraints off when he sleeps. Actively moving feet and hands.  Communicating with nods and writing few while in hospital  - See additional Care Plan goals for specific interventions   Outcome: Progressing  Goal: Patient/Family Short Term Goal  Description  Patient's Short Term Goal: \"per Daughter - we have a caregiver at night for him, last time he had olga appropriate and evaluate response  - Consider cultural and social influences on pain and pain management  - Manage/alleviate anxiety  - Utilize distraction and/or relaxation techniques  - Monitor for opioid side effects  - Notify MD/LIP if interventions un medication administration  - Ensure safe mobilization of patient  - Hold pressure on venipuncture sites to achieve adequate hemostasis  - Assess for signs and symptoms of internal bleeding  - Monitor lab trends  - Patient is to report abnormal signs of ble Incentive Spirometry  - Assess the need for suctioning and perform as needed  - Assess and instruct to report SOB or any respiratory difficulty  - Respiratory Therapy support as indicated  - Manage/alleviate anxiety  - Monitor for signs/symptoms of CO2 ret regimen  - Administer medications as ordered  - Implement isolation precautions as needed  - Implement decolonization protocol as needed  Outcome: Progressing     Problem: Musculoskeletal  Goal: Return mobility to safest level of function  Description  INT

## 2020-01-23 NOTE — PROGRESS NOTES
[facility]    Progress Note    Roxannedaniella Hauserlander Patient Status:  Inpatient    1940 MRN [id number]   Location [facility] 2W/SW Attending Silvia Joseph,    Hosp Day # 15 PCP No primary care provider on file.        Subjec Sodium  50 mcg Oral Before breakfast   • atorvastatin  10 mg Oral Nightly   • tamsulosin HCl  0.8 mg Oral Nightly       Continuous Infusions:   • dexmedetomidine Stopped (01/23/20 0853)   • bumetanide (BUMEX) 0.125 mg/ml infusion Stopped (01/23/20 0840) status   Carotid stenosis noted on pre op U/S= >70% on right. Pt. Aware & asymptomatic. Plan for further evaluation during post op follow up appt.   Repeat Echo 1/17 no pericardial effusion  Continue to betadine swab  CV surgical incision twice daily  Disch basilar pleural effusion with atelectasis. Dictated by (CST): Trish Fonseca MD on 1/22/2020 at 14:15     Approved by (CST): Trish Fonseca MD on 1/22/2020 at 14:20          Xr Chest Ap Portable  (cpt=71045)    Result Date: 1/22/2020  CONCLUSION:  1.  Fe upper lung. 2. Small right basilar pleural effusion with atelectasis-unchanged. 3. Support devices remain in place.     Dictated by (CST): Danuta Humphries MD on 1/22/2020 at 7:43     Approved by (CST): Danuta Humphries MD on 1/22/2020 at 7:51          Ir Chest

## 2020-01-23 NOTE — DIETARY NOTE
ADULT NUTRITION UPDATE NOTE    Pt is at high nutrition risk. Pt does not meet malnutrition criteria. RECOMMENDATIONS TO MD:  See Nutrition Intervention for EN rx. EN order set in place. EN formula change to fiber-enriched per RD.       NUTRITION SANDEEP FWF: 30 ml q 4 hrs to start (180 ml). Total h20: 1060 ml/24 hrs. FWF may be increased as appropriate.     1/22 last propofol- (had been providing additional 200 kcals via lipids)   - Medical Food Supplements-NPO.   - Vitamin and mineral supplements: multivi 1/17.     GASTROINTESTINAL: Diarrhea- new. Trial utilize fiber-enriched formula. OG tube in place. FOOD/NUTRITION RELATED HISTORY:  Appetite: Good PTA. Intake: fair here when able to eat.    Intake Meeting Needs: TF meeting needs  (% of needs met) Nightly   PRN meds: sodium bicarbonate **AND** pancrelipase (Lip-Prot-Amyl), dextrose, ipratropium-albuterol, HYDROcodone-acetaminophen, ALPRAZolam, Normal Saline Flush, HYDROmorphone HCl, HYDROmorphone HCl, HYDROmorphone HCl, acetaminophen **OR** acetamin Administration:      Monitor: tolerance to enteral nutrition, adequacy of enteral nutrition and for enteral nutrition adjustment.    - Anthropometric Measurement:      Monitor: wt and wt change  - Nutrition Goals:      allow wt loss due to fluid losses, tub

## 2020-01-24 ENCOUNTER — APPOINTMENT (OUTPATIENT)
Dept: GENERAL RADIOLOGY | Facility: HOSPITAL | Age: 80
DRG: 003 | End: 2020-01-24
Attending: INTERNAL MEDICINE
Payer: MEDICARE

## 2020-01-24 ENCOUNTER — APPOINTMENT (OUTPATIENT)
Dept: GENERAL RADIOLOGY | Facility: HOSPITAL | Age: 80
DRG: 003 | End: 2020-01-24
Attending: ANESTHESIOLOGY
Payer: MEDICARE

## 2020-01-24 LAB
ALBUMIN SERPL-MCNC: 1.8 G/DL (ref 3.4–5)
ANION GAP SERPL CALC-SCNC: 6 MMOL/L (ref 0–18)
APTT PPP: 53.5 SECONDS (ref 23.2–35.3)
BASOPHILS # BLD AUTO: 0.02 X10(3) UL (ref 0–0.2)
BASOPHILS NFR BLD AUTO: 0.2 %
BLOOD TYPE BARCODE: 5100
BUN BLD-MCNC: 85 MG/DL (ref 7–18)
BUN/CREAT SERPL: 26.5 (ref 10–20)
CALCIUM BLD-MCNC: 8.4 MG/DL (ref 8.5–10.1)
CHLORIDE SERPL-SCNC: 100 MMOL/L (ref 98–112)
CO2 SERPL-SCNC: 36 MMOL/L (ref 21–32)
CREAT BLD-MCNC: 3.21 MG/DL (ref 0.7–1.3)
DEPRECATED RDW RBC AUTO: 57.5 FL (ref 35.1–46.3)
EOSINOPHIL # BLD AUTO: 0.73 X10(3) UL (ref 0–0.7)
EOSINOPHIL NFR BLD AUTO: 5.5 %
ERYTHROCYTE [DISTWIDTH] IN BLOOD BY AUTOMATED COUNT: 17 % (ref 11–15)
GLUCOSE BLD-MCNC: 166 MG/DL (ref 70–99)
GLUCOSE BLDC GLUCOMTR-MCNC: 137 MG/DL (ref 70–99)
GLUCOSE BLDC GLUCOMTR-MCNC: 158 MG/DL (ref 70–99)
GLUCOSE BLDC GLUCOMTR-MCNC: 170 MG/DL (ref 70–99)
GLUCOSE BLDC GLUCOMTR-MCNC: 173 MG/DL (ref 70–99)
HAV IGM SER QL: 2.9 MG/DL (ref 1.6–2.6)
HCT VFR BLD AUTO: 29.7 % (ref 39–53)
HGB BLD-MCNC: 9.5 G/DL (ref 13–17.5)
IMM GRANULOCYTES # BLD AUTO: 0.5 X10(3) UL (ref 0–1)
IMM GRANULOCYTES NFR BLD: 3.8 %
LYMPHOCYTES # BLD AUTO: 1.3 X10(3) UL (ref 1–4)
LYMPHOCYTES NFR BLD AUTO: 9.8 %
MCH RBC QN AUTO: 29.7 PG (ref 26–34)
MCHC RBC AUTO-ENTMCNC: 32 G/DL (ref 31–37)
MCV RBC AUTO: 92.8 FL (ref 80–100)
MONOCYTES # BLD AUTO: 1.09 X10(3) UL (ref 0.1–1)
MONOCYTES NFR BLD AUTO: 8.2 %
NEUTROPHILS # BLD AUTO: 9.63 X10 (3) UL (ref 1.5–7.7)
NEUTROPHILS # BLD AUTO: 9.63 X10(3) UL (ref 1.5–7.7)
NEUTROPHILS NFR BLD AUTO: 72.5 %
OSMOLALITY SERPL CALC.SUM OF ELEC: 324 MOSM/KG (ref 275–295)
PHOSPHATE SERPL-MCNC: 4.1 MG/DL (ref 2.5–4.9)
PLATELET # BLD AUTO: 368 10(3)UL (ref 150–450)
POTASSIUM SERPL-SCNC: 3.2 MMOL/L (ref 3.5–5.1)
POTASSIUM SERPL-SCNC: 3.3 MMOL/L (ref 3.5–5.1)
RBC # BLD AUTO: 3.2 X10(6)UL (ref 3.8–5.8)
SODIUM SERPL-SCNC: 142 MMOL/L (ref 136–145)
WBC # BLD AUTO: 13.3 X10(3) UL (ref 4–11)

## 2020-01-24 PROCEDURE — 99291 CRITICAL CARE FIRST HOUR: CPT | Performed by: INTERNAL MEDICINE

## 2020-01-24 PROCEDURE — 71045 X-RAY EXAM CHEST 1 VIEW: CPT | Performed by: ANESTHESIOLOGY

## 2020-01-24 PROCEDURE — 71045 X-RAY EXAM CHEST 1 VIEW: CPT | Performed by: INTERNAL MEDICINE

## 2020-01-24 RX ORDER — POTASSIUM CHLORIDE 29.8 MG/ML
40 INJECTION INTRAVENOUS ONCE
Status: COMPLETED | OUTPATIENT
Start: 2020-01-24 | End: 2020-01-24

## 2020-01-24 RX ORDER — IPRATROPIUM BROMIDE AND ALBUTEROL SULFATE 2.5; .5 MG/3ML; MG/3ML
3 SOLUTION RESPIRATORY (INHALATION)
Status: DISCONTINUED | OUTPATIENT
Start: 2020-01-24 | End: 2020-02-04

## 2020-01-24 RX ORDER — MINERAL OIL AND PETROLATUM 150; 830 MG/G; MG/G
OINTMENT OPHTHALMIC AS NEEDED
Status: DISCONTINUED | OUTPATIENT
Start: 2020-01-24 | End: 2020-02-04

## 2020-01-24 RX ORDER — ACETYLCYSTEINE 200 MG/ML
2 SOLUTION ORAL; RESPIRATORY (INHALATION) 2 TIMES DAILY
Status: DISCONTINUED | OUTPATIENT
Start: 2020-01-24 | End: 2020-02-04

## 2020-01-24 RX ORDER — NYSTATIN 100000 U/G
CREAM TOPICAL 2 TIMES DAILY
Status: DISCONTINUED | OUTPATIENT
Start: 2020-01-24 | End: 2020-02-04

## 2020-01-24 RX ORDER — POTASSIUM CHLORIDE 1.5 G/1.77G
20 POWDER, FOR SOLUTION ORAL ONCE
Status: COMPLETED | OUTPATIENT
Start: 2020-01-24 | End: 2020-01-24

## 2020-01-24 RX ORDER — ALBUTEROL SULFATE 2.5 MG/3ML
2.5 SOLUTION RESPIRATORY (INHALATION) 2 TIMES DAILY
Status: DISCONTINUED | OUTPATIENT
Start: 2020-01-24 | End: 2020-01-24

## 2020-01-24 NOTE — PROGRESS NOTES
[facility]    Progress Note    Franky Villagran Patient Status:  Inpatient    1940 MRN [id number]   Location [facility] 2W/SW Attending Yolanda Green,    Hosp Day # 14 PCP No primary care provider on file.        Subjec infusion Stopped (01/23/20 0840)   • Heparin Sod (Porcine) in D5W 1,000 Units/hr (01/24/20 7833)   • dextrose     • sodium chloride 5 mL/hr at 01/24/20 0826   • DOBUTamine Stopped (01/20/20 0000)   • Nitroglycerin in D5W         General appearance:   In bed ortho  Diarrhea; stopped colace and senna  Discharge Planning; rehab at discharge will have PT/OT re evaluate now that he has been extubated    Addendum: rounded with CV surgeon. When the heparin needs to be turned off will remove pacer wires.  heparin shou Ap Portable  (cpt=71045)    Result Date: 1/23/2020  CONCLUSION:  1. Slight improvement in aeration in the left lower lung related to decrease in pleural effusion and atelectasis. Left-sided chest tube remains in place.  2. Slight improvement in aeration in

## 2020-01-24 NOTE — SLP NOTE
New orders received and acknowledged. SLP attempt at 2:25pm, however, RN Tylor Lei expressed pt is not appropriate for swallow evaluation today. SLP to assess pt as pt is appropriate/alert and able to fully participate. Thank you. Angeles Fontanez

## 2020-01-24 NOTE — PLAN OF CARE
Vitals remain stable up to this point. Precedex restarted approx 2000 as pt was requesting something to assist with sleep.  This has worked well for him as he is easily arousable, follows commands, uses the call light, but is able to be calm and sleep in-be relaxation techniques  - Monitor for opioid side effects  - Notify MD/LIP if interventions unsuccessful or patient reports new pain  - Anticipate increased pain with activity and pre-medicate as appropriate  Outcome: Progressing     Problem: SAFETY ADULT - internal bleeding  - Monitor lab trends  - Patient is to report abnormal signs of bleeding to staff  - Avoid use of toothpicks and dental floss  - Use electric shaver for shaving  - Use soft bristle tooth brush  - Limit straining and forceful nose blowing as indicated  - Manage/alleviate anxiety  - Monitor for signs/symptoms of CO2 retention  Outcome: Progressing     Problem: METABOLIC  Goal: Glucose maintained within prescribed range  Description  INTERVENTIONS:  - Monitor Blood Glucose as ordered  - Asses prevent harm  - Notify patient and family of reasons restraints applied  - Assess for any contributing factors to confusion (electrolyte disturbances, delirium, medications)  - Discontinue any unnecessary medical devices as soon as possible  - Assess the p

## 2020-01-24 NOTE — PROGRESS NOTES
[facility] ID PROGRESS NOTE    Cynthia Wade Patient Status:  Inpatient    1940 MRN [id number]   Location [facility] 2W/SW Attending Concetta Boateng [place] Day # 15 PCP No primary care provider on file.      Subjective:  Remains intuba including B shouilders and knees. Had L THR originally in 2015 and then had to be revised due to dislocation in 2016. Per pt, since that time has had pain on and off, but more recently.  In Jan 2019 had a hip aspiration and this showed staph epi and pt was

## 2020-01-24 NOTE — PROGRESS NOTES
[facility]    Cardiology - [facility]  Progress Note    Cholo Lee Patient Status:  Inpatient    1940 MRN [id number]   Location [facility] 2W/SW Attending Priscilla Bertrand # 14 PCP No primary care provider 01/24/2020    HCT 29.7 01/24/2020    .0 01/24/2020    CREATSERUM 3.21 01/24/2020    BUN 85 01/24/2020     01/24/2020    K 3.2 01/24/2020     01/24/2020    CO2 36.0 01/24/2020     01/24/2020    CA 8.4 01/24/2020    ALB 1.8 01/24/20

## 2020-01-24 NOTE — PROGRESS NOTES
[place] [facility] - [facility]    Progress Note    Kimi Ryan Patient Status:  Inpatient    1940 MRN [id number]   Location [facility] 2W/SW Attending Mely Hinojosa DO   Hosp Day # 14 PCP No primary care provider on file.        Subjec CO2 36.0 (H) 01/24/2020     (H) 01/24/2020    CA 8.4 (L) 01/24/2020    ALB 1.8 (L) 01/24/2020    ALKPHO 115 01/18/2020    BILT 0.5 01/18/2020    TP 5.1 (L) 01/18/2020    AST 41 (H) 01/18/2020    ALT 21 01/18/2020    PTT 53.5 (H) 01/24/2020    INR 1. 8:11          Xr Chest Ap Portable  (cpt=71045)    Result Date: 1/22/2020  CONCLUSION:  1. Feeding tube tip in gastric antrum. 2. Mild CHF . 3. Left-sided chest tube in place. Small left basilar pleural effusion with perihilar and basilar atelectasis.  4.

## 2020-01-24 NOTE — PLAN OF CARE
Calm and cooperative in morning. Following commands. No need for restraints. Blood pressure elevated. HR mostly 80's- 90's noted PVC's when stressed.  Potassium replaced in am Potassium level pending Continue to hold Metoprolol and amiodarone until direct care  Description  Interventions:  - What would you like us to know as we care for you?  \"per daughter, we have a caregiver for him from 9pm to 9am\"  - Provide timely, complete, and accurate information to patient/family  - Incorporate patient and family falls.  - [place] fall precautions as indicated by assessment.  - Educate pt/family on patient safety including physical limitations  - Instruct pt to call for assistance with activity based on assessment  - Modify environment to reduce risk of injury  - and trends  - Monitor for bleeding, hypotension and signs of decreased cardiac output  - Evaluate effectiveness of vasoactive medications to optimize hemodynamic stability  - Monitor arterial and/or venous puncture sites for bleeding and/or hematoma  - Ass Administer medications as ordered  - Assess and document risk factors for pressure ulcer development  - Assess and document dressing/incision, wound bed, drain sites and surrounding tissue  - Implement wound care per orders  - Initiate self-injury protocol

## 2020-01-24 NOTE — PROGRESS NOTES
[facility]    Progress Note    Terrance Lilly Patient Status:  Inpatient    1940 MRN [id number]   Location [facility] 2W/SW Attending Julia Benitez,    Hosp Day # 14 PCP No primary care provider on file.         Sub better today after placement of left sided pigtail ct   Did well on SBT with cpap /ps 5/5 x 3 hrs   Very good parameter with SBRI of 50   seens several times today   Will extubate and will follow   D/w Dr Lauryn Polanco      4- left pleural effusion   Post CABG b Approved by (CST): Paul Dash MD on 1/24/2020 at 8:11          Xr Chest Ap Portable  (cpt=71045)    Result Date: 1/23/2020  CONCLUSION:  1.  No pneumothorax status post left pleural drainage catheter removal.  A trace residual left pleural effu the right lower lobe adjacent to the diaphragm. Nurse was notified. Other findings are unchanged.      Dictated by (CST): Benedicto Ag MD on 1/22/2020 at 13:30     Approved by (CST): Benedicto Ag MD on 1/22/2020 at 13:33          Xr Chest Ap Portable

## 2020-01-25 ENCOUNTER — APPOINTMENT (OUTPATIENT)
Dept: GENERAL RADIOLOGY | Facility: HOSPITAL | Age: 80
DRG: 003 | End: 2020-01-25
Attending: INTERNAL MEDICINE
Payer: MEDICARE

## 2020-01-25 LAB
ALBUMIN SERPL-MCNC: 1.8 G/DL (ref 3.4–5)
ANION GAP SERPL CALC-SCNC: 6 MMOL/L (ref 0–18)
APTT PPP: 58.7 SECONDS (ref 23.2–35.3)
BASOPHILS # BLD AUTO: 0.02 X10(3) UL (ref 0–0.2)
BASOPHILS NFR BLD AUTO: 0.2 %
BUN BLD-MCNC: 93 MG/DL (ref 7–18)
BUN/CREAT SERPL: 29.8 (ref 10–20)
CALCIUM BLD-MCNC: 7.9 MG/DL (ref 8.5–10.1)
CHLORIDE SERPL-SCNC: 105 MMOL/L (ref 98–112)
CO2 SERPL-SCNC: 34 MMOL/L (ref 21–32)
CREAT BLD-MCNC: 3.12 MG/DL (ref 0.7–1.3)
DEPRECATED RDW RBC AUTO: 58.7 FL (ref 35.1–46.3)
EOSINOPHIL # BLD AUTO: 0.66 X10(3) UL (ref 0–0.7)
EOSINOPHIL NFR BLD AUTO: 5.8 %
ERYTHROCYTE [DISTWIDTH] IN BLOOD BY AUTOMATED COUNT: 17.3 % (ref 11–15)
GLUCOSE BLD-MCNC: 144 MG/DL (ref 70–99)
GLUCOSE BLDC GLUCOMTR-MCNC: 138 MG/DL (ref 70–99)
GLUCOSE BLDC GLUCOMTR-MCNC: 143 MG/DL (ref 70–99)
GLUCOSE BLDC GLUCOMTR-MCNC: 158 MG/DL (ref 70–99)
HAV IGM SER QL: 2.9 MG/DL (ref 1.6–2.6)
HCT VFR BLD AUTO: 28.8 % (ref 39–53)
HGB BLD-MCNC: 9.2 G/DL (ref 13–17.5)
IMM GRANULOCYTES # BLD AUTO: 0.33 X10(3) UL (ref 0–1)
IMM GRANULOCYTES NFR BLD: 2.9 %
LYMPHOCYTES # BLD AUTO: 1.12 X10(3) UL (ref 1–4)
LYMPHOCYTES NFR BLD AUTO: 9.9 %
MCH RBC QN AUTO: 30.2 PG (ref 26–34)
MCHC RBC AUTO-ENTMCNC: 31.9 G/DL (ref 31–37)
MCV RBC AUTO: 94.4 FL (ref 80–100)
MONOCYTES # BLD AUTO: 1.1 X10(3) UL (ref 0.1–1)
MONOCYTES NFR BLD AUTO: 9.7 %
NEUTROPHILS # BLD AUTO: 8.09 X10 (3) UL (ref 1.5–7.7)
NEUTROPHILS # BLD AUTO: 8.09 X10(3) UL (ref 1.5–7.7)
NEUTROPHILS NFR BLD AUTO: 71.5 %
OSMOLALITY SERPL CALC.SUM OF ELEC: 331 MOSM/KG (ref 275–295)
PHOSPHATE SERPL-MCNC: 4 MG/DL (ref 2.5–4.9)
PLATELET # BLD AUTO: 385 10(3)UL (ref 150–450)
POTASSIUM SERPL-SCNC: 3.7 MMOL/L (ref 3.5–5.1)
RBC # BLD AUTO: 3.05 X10(6)UL (ref 3.8–5.8)
SODIUM SERPL-SCNC: 145 MMOL/L (ref 136–145)
WBC # BLD AUTO: 11.3 X10(3) UL (ref 4–11)

## 2020-01-25 PROCEDURE — 99291 CRITICAL CARE FIRST HOUR: CPT | Performed by: INTERNAL MEDICINE

## 2020-01-25 PROCEDURE — 71045 X-RAY EXAM CHEST 1 VIEW: CPT | Performed by: INTERNAL MEDICINE

## 2020-01-25 NOTE — PROGRESS NOTES
[facility]    Cardiology - [facility]  Progress Note    Melly Vizcaino Patient Status:  Inpatient    1940 MRN [id number]   Location [facility] 2W/SW Attending Natty Bertrand # 15 PCP No primary care provider 01/25/2020    ALB 1.8 01/25/2020    PTT 58.7 01/25/2020    MG 2.9 01/25/2020    PHOS 4.0 01/25/2020

## 2020-01-25 NOTE — PROGRESS NOTES
[facility]    Progress Note    Jorge Sena Patient Status:  Inpatient    1940 MRN [id number]   Location [facility] 2W/SW Attending Luda Enamorado DO   Hosp Day # 15 PCP No primary care provider on file.        Subjec 01/14/2020    MG 2.9 (H) 01/25/2020    PHOS 4.0 01/25/2020    TROP 4.030 (HH) 01/17/2020     01/21/2020       Xr Chest Ap Portable  (cpt=71045)    Result Date: 1/25/2020  CONCLUSION:  1. No significant change since previous study.  2. Left lower lung

## 2020-01-25 NOTE — PHYSICAL THERAPY NOTE
PHYSICAL THERAPY RE-EVALUATION - INPATIENT     Room Number: 230/230-A  Evaluation Date: 1/15/2020  Type of Evaluation: Re-evaluation   Physician Order: PT Eval and Treat    Presenting Problem: (Lt THR rrevision on 1/10, CABG on 1/14, Intubated now)  Shirleyo Approx Degree of Impairment: 81.38% has been calculated based on documentation in the [facility] '6 clicks' Inpatient Basic Mobility Short Form. Research supports that patients with this level of impairment may benefit from LTAC.  Pt was MOD IND with ADLs  prio Yogi Bernstein MD at [address] [place] [address] MAIN OR   • HIP TOTAL ARTHROPLASTY REVISION Left 1/10/2020    Performed by Janet Price MD at [address] [place] [address] MAIN OR   • SPINE SURGERY PROCEDURE UNLISTED     • TOTAL HIP REPLACEMENT Left    • TOTAL KNEE REPLACEMENT Bilateral      RYAN wheelchair, bedside commode, etc.): A Lot   -   Moving from lying on back to sitting on the side of the bed?: A Lot   How much help from another person does the patient currently need. ..   -   Moving to and from a bed to a chair (including a wheelchair)?: to ensure safety and proper healing   Goal #5   Current Status    Goal #6 Patient to demonstrate independence with home activity/exercise instructions provided to patient in preparation for discharge.    Goal #6  Current Status

## 2020-01-25 NOTE — PLAN OF CARE
Restraints remain in place for line safety, but pt is more alert and following commands as the night is progressing.  Will reassess in the AM.    Problem: Safety Risk - Non-Violent Restraints  Goal: Patient will remain free from self-harm  Description  INTE

## 2020-01-25 NOTE — OCCUPATIONAL THERAPY NOTE
OCCUPATIONAL THERAPY EVALUATION - INPATIENT     Room Number: 230/230-A  Evaluation Date: 1/25/2020  Type of Evaluation:Re-eval  Presenting Problem: s/p CABG x4 on 1/14, s/p LT THR 1/10    Physician Order: IP Consult to Occupational Therapy  Reason for Ther Recommendations: TBD    PLAN  OT Treatment Plan: Balance activities; Energy conservation/work simplification techniques;ADL training;Functional transfer training;UE strengthening/ROM; Endurance training;Patient/Family education; Fine motor coordination Antonio Demarco THERAPY EXAMINATION      OBJECTIVE  Precautions: DEVIN - anterior  Fall Risk: High fall risk    PAIN ASSESSMENT  Rating: Unable to rate  Location: (sternum)  Management Techniques: Relaxation    ACTIVITY TOLERANCE  Pulse: 70  Heart Rate Source: Monitor

## 2020-01-25 NOTE — PLAN OF CARE
On BIPAP since about 1400. Unable  to expectorate secretions. Cough weak. Briefly taken off BiPAP Stating he could not breath. SpO2 dropping into 80's Anxious Hr 90's . PVC's noted. Suctioned. Bipap replaced and consulted  [employer] by phone.  Decision to r range  Description  INTERVENTIONS:  - Monitor Blood Glucose as ordered  - Assess for signs and symptoms of hyperglycemia and hypoglycemia  - Administer ordered medications to maintain glucose within target range  - Assess barriers to adequate nutritional i document skin integrity  - Monitor for areas of redness and/or skin breakdown  - Initiate interventions, skin care algorithm/standards of care as needed  Outcome: Progressing     Problem: HEMATOLOGIC - ADULT  Goal: Maintains hematologic stability  Descript 12 lead EKG if indicated  - Evaluate effectiveness of antiarrhythmic and heart rate control medications as ordered  - Initiate emergency measures for life threatening arrhythmias  - Monitor electrolytes and administer replacement therapy as ordered  Outcom medications as ordered  - Implement isolation precautions as needed  - Implement decolonization protocol as needed  Outcome: Progressing     Problem: Musculoskeletal  Goal: Return mobility to safest level of function  Description  INTERVENTIONS:  - Assess

## 2020-01-25 NOTE — RESPIRATORY THERAPY NOTE
Received patient intubated/mechanically ventilated on full support. Was able to wean FIO2 from 100% to 40%. Vent settings AC16/500/40%/+5. ETT patent and secured 25 @ lips. Suction provided when indicated. RT will continue to monitor.

## 2020-01-25 NOTE — PROGRESS NOTES
General appearance:  laying in bed, awake, intubated  Neurologic: awake and alert following commands; moving his extremities  Psychiatric: calm  Sternum Incision RAVEN; C/D/I; healing; right leg incision RAVEN; C/D/I healing  Pulmonary: faint exp wheezes anter before and after pacer wire removal  Vitals and labs reviewed    Layla Nurse CIERRA

## 2020-01-25 NOTE — SLP NOTE
SLP contacted RN to follow-up per 1/24 SLP dept recommendations as patient was extubated with new MD orders for BSSE at that time. However, pt now re-intubated and not appropriate for BSSE and/or SLP services at this time of attempt.  Will follow upon extub

## 2020-01-26 LAB
ALBUMIN SERPL-MCNC: 2 G/DL (ref 3.4–5)
ANION GAP SERPL CALC-SCNC: 6 MMOL/L (ref 0–18)
BASOPHILS # BLD AUTO: 0.03 X10(3) UL (ref 0–0.2)
BASOPHILS NFR BLD AUTO: 0.3 %
BUN BLD-MCNC: 89 MG/DL (ref 7–18)
BUN/CREAT SERPL: 30.2 (ref 10–20)
CALCIUM BLD-MCNC: 8.4 MG/DL (ref 8.5–10.1)
CHLORIDE SERPL-SCNC: 104 MMOL/L (ref 98–112)
CO2 SERPL-SCNC: 35 MMOL/L (ref 21–32)
CREAT BLD-MCNC: 2.95 MG/DL (ref 0.7–1.3)
DEPRECATED RDW RBC AUTO: 58.3 FL (ref 35.1–46.3)
EOSINOPHIL # BLD AUTO: 0.78 X10(3) UL (ref 0–0.7)
EOSINOPHIL NFR BLD AUTO: 7.3 %
ERYTHROCYTE [DISTWIDTH] IN BLOOD BY AUTOMATED COUNT: 17.2 % (ref 11–15)
GLUCOSE BLD-MCNC: 120 MG/DL (ref 70–99)
GLUCOSE BLDC GLUCOMTR-MCNC: 126 MG/DL (ref 70–99)
GLUCOSE BLDC GLUCOMTR-MCNC: 138 MG/DL (ref 70–99)
GLUCOSE BLDC GLUCOMTR-MCNC: 149 MG/DL (ref 70–99)
GLUCOSE BLDC GLUCOMTR-MCNC: 151 MG/DL (ref 70–99)
HAV IGM SER QL: 3 MG/DL (ref 1.6–2.6)
HCT VFR BLD AUTO: 30 % (ref 39–53)
HGB BLD-MCNC: 9.2 G/DL (ref 13–17.5)
IMM GRANULOCYTES # BLD AUTO: 0.16 X10(3) UL (ref 0–1)
IMM GRANULOCYTES NFR BLD: 1.5 %
LYMPHOCYTES # BLD AUTO: 1.29 X10(3) UL (ref 1–4)
LYMPHOCYTES NFR BLD AUTO: 12.1 %
MCH RBC QN AUTO: 28.7 PG (ref 26–34)
MCHC RBC AUTO-ENTMCNC: 30.7 G/DL (ref 31–37)
MCV RBC AUTO: 93.5 FL (ref 80–100)
MONOCYTES # BLD AUTO: 1.04 X10(3) UL (ref 0.1–1)
MONOCYTES NFR BLD AUTO: 9.7 %
NEUTROPHILS # BLD AUTO: 7.4 X10 (3) UL (ref 1.5–7.7)
NEUTROPHILS # BLD AUTO: 7.4 X10(3) UL (ref 1.5–7.7)
NEUTROPHILS NFR BLD AUTO: 69.1 %
OSMOLALITY SERPL CALC.SUM OF ELEC: 328 MOSM/KG (ref 275–295)
PHOSPHATE SERPL-MCNC: 4.1 MG/DL (ref 2.5–4.9)
PLATELET # BLD AUTO: 416 10(3)UL (ref 150–450)
POTASSIUM SERPL-SCNC: 3.5 MMOL/L (ref 3.5–5.1)
RBC # BLD AUTO: 3.21 X10(6)UL (ref 3.8–5.8)
SODIUM SERPL-SCNC: 145 MMOL/L (ref 136–145)
WBC # BLD AUTO: 10.7 X10(3) UL (ref 4–11)

## 2020-01-26 PROCEDURE — 99291 CRITICAL CARE FIRST HOUR: CPT | Performed by: INTERNAL MEDICINE

## 2020-01-26 NOTE — PROGRESS NOTES
[facility]    Progress Note    Cholo Lee Patient Status:  Inpatient    1940 MRN [id number]   Location [facility] 2W/SW Attending Lidia Chamorro DO   Hosp Day # 15 PCP No primary care provider on file.         Sub RV strain on echo      7- acute Kidney injury / ATN    good UO   Renal following      8-s/p revision of total replacement of left hip joint on 1/10/2020  Prior multiple hip surgery and back surgery recently     H/o   Infection of prosthetic total hip joint worsening pulmonary edema and a stable small left pleural effusion. 3. Unchanged passive atelectasis at the left lung base.      Dictated by (CST): Carlos A Solano MD on 1/24/2020 at 19:56     Approved by (CST): Carlos A Solano MD on 1/24/2020 at 20:0

## 2020-01-26 NOTE — PLAN OF CARE
Problem: Patient/Family Goals  Goal: Patient/Family Long Term Goal  Description  Patient's Long Term Goal: \"To be able to get back home and walking\"    Interventions:  - Patient will need to discharge to rehab when cleared for further strength and mobi comfort level or patient's stated pain goal  Description  INTERVENTIONS:  - Encourage pt to monitor pain and request assistance  - Assess pain using appropriate pain scale  - Administer analgesics based on type and severity of pain and evaluate response  - medications as ordered and appropriate  - Administer supportive blood products/factors as ordered and appropriate  Outcome: Progressing  Goal: Free from bleeding injury  Description  (Example usage: patient with low platelets)  INTERVENTIONS:  - Avoid intr for changes in mentation and behavior  - Position to facilitate oxygenation and minimize respiratory effort  - Oxygen supplementation based on oxygen saturation or ABGs  - Provide Smoking Cessation handout, if applicable  - Encourage broncho-pulmonary hygi appropriate  - Initiate Pressure Ulcer prevention bundle as indicated  Outcome: Progressing  Goal: Oral and nasal mucous membranes remain intact and without s/s of infection  Description  INTERVENTIONS:  - Assess oral mucosa and hygiene practices  - Bhumi

## 2020-01-26 NOTE — PHYSICAL THERAPY NOTE
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

## 2020-01-26 NOTE — PROGRESS NOTES
General appearance:  laying in bed, awake, intubated  Neurologic: awake and alert following commands; moving his extremities  Psychiatric: calm  Sternum Incision RAVEN; C/D/I; healing; right leg incision RAVEN; C/D/I healing  Pulmonary: faint exp wheezes anter before and after pacer wire removal  Vitals and labs reviewed     Jamal Rene RN

## 2020-01-26 NOTE — PROGRESS NOTES
[facility]    Progress Note    Eda Castillo Patient Status:  Inpatient    1940 MRN [id number]   Location [facility] 2W/SW Attending Sary Lo,    Hosp Day # 16 PCP No primary care provider on file.        Subjec 01/25/2020    INR 1.29 (H) 01/18/2020    TSH 3.250 01/14/2020    MG 3.0 (H) 01/26/2020    PHOS 4.1 01/26/2020    TROP 4.030 (HH) 01/17/2020     01/21/2020       Xr Chest Ap Portable  (cpt=71045)    Result Date: 1/25/2020  CONCLUSION:  1.  No significa

## 2020-01-26 NOTE — PLAN OF CARE
Problem: PAIN - ADULT  Goal: Verbalizes/displays adequate comfort level or patient's stated pain goal  Description  INTERVENTIONS:  - Encourage pt to monitor pain and request assistance  - Assess pain using appropriate pain scale  - Administer analgesics enemas and rectal medication administration  - Ensure safe mobilization of patient  - Hold pressure on venipuncture sites to achieve adequate hemostasis  - Assess for signs and symptoms of internal bleeding  - Monitor lab trends  - Patient is to report abn infection  Description  INTERVENTIONS:  - Assess and document skin integrity  - Administer medications as ordered  - Assess and document risk factors for pressure ulcer development  - Assess and document dressing/incision, wound bed, drain sites and surrou

## 2020-01-26 NOTE — PLAN OF CARE
Problem: HEMATOLOGIC - ADULT  Goal: Maintains hematologic stability  Description  INTERVENTIONS  - Assess for signs and symptoms of bleeding or hemorrhage  - Monitor labs and vital signs for trends  - Administer supportive blood products/factors, fluids broncho-pulmonary hygiene including cough, deep breathe, Incentive Spirometry  - Assess the need for suctioning and perform as needed  - Assess and instruct to report SOB or any respiratory difficulty  - Respiratory Therapy support as indicated  - Manage/a

## 2020-01-26 NOTE — PROGRESS NOTES
[facility]    Progress Note    Ellie Dsouza Patient Status:  Inpatient    1940 MRN [id number]   Location [facility] 2W/SW Attending Yannick Maddox, DO   Hosp Day # 16 PCP No primary care provider on file.         Sub well for 4-5 hr then start to get sob and high BP and placed back on full support .     Recommend trach / I believe it is safer and better to proceed with agressive rehab        4- VTE   + DVT   We will to do chest CT with renal failure  Continue with IV he 01/18/2020    TSH 3.250 01/14/2020    MG 3.0 (H) 01/26/2020    PHOS 4.1 01/26/2020    TROP 4.030 (HH) 01/17/2020     01/21/2020       Xr Chest Ap Portable  (cpt=71045)    Result Date: 1/25/2020  CONCLUSION:  1.  No significant change since previous st

## 2020-01-26 NOTE — PROGRESS NOTES
[facility]    Cardiology - [facility]-S  Progress Note    Jayla Levin Patient Status:  Inpatient    1940 MRN [id number]   Location [facility] 2W/SW Attending Jaison Cassidy Day # 16 PCP No primary care provider CO2 35.0 01/26/2020     01/26/2020    CA 8.4 01/26/2020    ALB 2.0 01/26/2020    MG 3.0 01/26/2020    PHOS 4.1 01/26/2020

## 2020-01-27 ENCOUNTER — APPOINTMENT (OUTPATIENT)
Dept: GENERAL RADIOLOGY | Facility: HOSPITAL | Age: 80
DRG: 003 | End: 2020-01-27
Attending: INTERNAL MEDICINE
Payer: MEDICARE

## 2020-01-27 LAB
ANION GAP SERPL CALC-SCNC: 4 MMOL/L (ref 0–18)
ANTIBODY SCREEN: NEGATIVE
APTT PPP: 64.7 SECONDS (ref 23.2–35.3)
BASOPHILS # BLD AUTO: 0.03 X10(3) UL (ref 0–0.2)
BASOPHILS NFR BLD AUTO: 0.3 %
BUN BLD-MCNC: 83 MG/DL (ref 7–18)
BUN/CREAT SERPL: 29 (ref 10–20)
CALCIUM BLD-MCNC: 7.9 MG/DL (ref 8.5–10.1)
CHLORIDE SERPL-SCNC: 108 MMOL/L (ref 98–112)
CK SERPL-CCNC: 143 U/L (ref 39–308)
CO2 SERPL-SCNC: 36 MMOL/L (ref 21–32)
CREAT BLD-MCNC: 2.86 MG/DL (ref 0.7–1.3)
DEPRECATED RDW RBC AUTO: 59.8 FL (ref 35.1–46.3)
EOSINOPHIL # BLD AUTO: 0.55 X10(3) UL (ref 0–0.7)
EOSINOPHIL NFR BLD AUTO: 6.2 %
ERYTHROCYTE [DISTWIDTH] IN BLOOD BY AUTOMATED COUNT: 17.2 % (ref 11–15)
GLUCOSE BLD-MCNC: 143 MG/DL (ref 70–99)
GLUCOSE BLDC GLUCOMTR-MCNC: 137 MG/DL (ref 70–99)
GLUCOSE BLDC GLUCOMTR-MCNC: 138 MG/DL (ref 70–99)
GLUCOSE BLDC GLUCOMTR-MCNC: 146 MG/DL (ref 70–99)
GLUCOSE BLDC GLUCOMTR-MCNC: 147 MG/DL (ref 70–99)
GLUCOSE BLDC GLUCOMTR-MCNC: 169 MG/DL (ref 70–99)
HCT VFR BLD AUTO: 26.5 % (ref 39–53)
HGB BLD-MCNC: 7.9 G/DL (ref 13–17.5)
HGB BLD-MCNC: 9.9 G/DL (ref 13–17.5)
IMM GRANULOCYTES # BLD AUTO: 0.12 X10(3) UL (ref 0–1)
IMM GRANULOCYTES NFR BLD: 1.3 %
LYMPHOCYTES # BLD AUTO: 0.98 X10(3) UL (ref 1–4)
LYMPHOCYTES NFR BLD AUTO: 11 %
MCH RBC QN AUTO: 28.6 PG (ref 26–34)
MCHC RBC AUTO-ENTMCNC: 29.8 G/DL (ref 31–37)
MCV RBC AUTO: 96 FL (ref 80–100)
MONOCYTES # BLD AUTO: 0.91 X10(3) UL (ref 0.1–1)
MONOCYTES NFR BLD AUTO: 10.2 %
NEUTROPHILS # BLD AUTO: 6.35 X10 (3) UL (ref 1.5–7.7)
NEUTROPHILS # BLD AUTO: 6.35 X10(3) UL (ref 1.5–7.7)
NEUTROPHILS NFR BLD AUTO: 71 %
OSMOLALITY SERPL CALC.SUM OF ELEC: 334 MOSM/KG (ref 275–295)
PLATELET # BLD AUTO: 360 10(3)UL (ref 150–450)
POTASSIUM SERPL-SCNC: 3.7 MMOL/L (ref 3.5–5.1)
RBC # BLD AUTO: 2.76 X10(6)UL (ref 3.8–5.8)
RH BLOOD TYPE: POSITIVE
SODIUM SERPL-SCNC: 148 MMOL/L (ref 136–145)
WBC # BLD AUTO: 8.9 X10(3) UL (ref 4–11)

## 2020-01-27 PROCEDURE — 71045 X-RAY EXAM CHEST 1 VIEW: CPT | Performed by: INTERNAL MEDICINE

## 2020-01-27 PROCEDURE — 99233 SBSQ HOSP IP/OBS HIGH 50: CPT | Performed by: INTERNAL MEDICINE

## 2020-01-27 PROCEDURE — 99222 1ST HOSP IP/OBS MODERATE 55: CPT | Performed by: INTERNAL MEDICINE

## 2020-01-27 PROCEDURE — 99221 1ST HOSP IP/OBS SF/LOW 40: CPT | Performed by: OTOLARYNGOLOGY

## 2020-01-27 RX ORDER — HYDRALAZINE HYDROCHLORIDE 10 MG/1
10 TABLET, FILM COATED ORAL EVERY 8 HOURS SCHEDULED
Status: DISCONTINUED | OUTPATIENT
Start: 2020-01-27 | End: 2020-01-28

## 2020-01-27 RX ORDER — CEFAZOLIN SODIUM/WATER 2 G/20 ML
2 SYRINGE (ML) INTRAVENOUS
Status: COMPLETED | OUTPATIENT
Start: 2020-01-28 | End: 2020-01-28

## 2020-01-27 RX ORDER — SODIUM CHLORIDE 9 MG/ML
INJECTION, SOLUTION INTRAVENOUS ONCE
Status: COMPLETED | OUTPATIENT
Start: 2020-01-27 | End: 2020-01-27

## 2020-01-27 RX ORDER — CEFAZOLIN SODIUM/WATER 2 G/20 ML
2 SYRINGE (ML) INTRAVENOUS
Status: DISCONTINUED | OUTPATIENT
Start: 2020-01-27 | End: 2020-01-27

## 2020-01-27 NOTE — OCCUPATIONAL THERAPY NOTE
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

## 2020-01-27 NOTE — DIETARY NOTE
ADULT NUTRITION REASSESSMENT     Pt is at high nutrition risk. Pt does not meet malnutrition criteria. RECOMMENDATIONS TO MD:  See Nutrition Intervention for EN rx. EN order set in place.  CPM.      NUTRITION DIAGNOSIS/PROBLEM:1)   Inadequate protei provide pt ~ 1690 kcals, 95% raul goal, 80 gm protein, 880 ml h20, >100% RDI's. FWF: 30 ml q 4 hrs to start (180 ml). Total h20: 1060 ml/24 hrs. FWF may be increased as appropriate.     1/22 last propofol- (had been providing additional 200 kcals via lipids signs)    GASTROINTESTINAL: NG rt nostril  tube in place. Medium loose BM today X 1 thus far. 1/26 X2 soft small /medium BM. Improved from last visit with change to fiber formula. FOOD/NUTRITION RELATED HISTORY:  Appetite: Good PTA.    Intake: fair here w Nitroglycerin in D5W, milrinone lactate in Dextrose, PEG 3350, magnesium hydroxide, bisacodyl, ondansetron HCl, potassium chloride **OR** potassium chloride, Magnesium Sulfate in D5W, Magnesium Sulfate, acetaminophen, [MAR Hold] Normal Saline Flush, [MAR H maintain lean body mass, tube feed meets greater than 80% of needs, labs acceptable limits, euglycemia,  support wound healing.        DIETITIAN FOLLOW UP: RD to follow  Antoine Campuzano RD, LDN, [address]  ([id number])

## 2020-01-27 NOTE — PROGRESS NOTES
[place] [facility]    Progress Note    Lamount Rushing Patient Status:  Inpatient    1940 MRN [id number]   Location [facility] 2W/SW Attending Colletta Silos, DO   Hosp Day # 16 PCP No primary care provider on file.        Subjec • Nitroglycerin in D5W         General appearance:   In bed watching TV on mechanical vent; awake and alert  Neurologic: awake and alert following commands; moving his extremities  Psychiatric: calm  Sternum Incision RAVEN; C/D/I; healing; right leg incisio Component Value Date    WBC 8.9 01/27/2020    HGB 7.9 (L) 01/27/2020    HCT 26.5 (L) 01/27/2020    .0 01/27/2020    CREATSERUM 2.86 (H) 01/27/2020    BUN 83 (H) 01/27/2020     (H) 01/27/2020    K 3.7 01/27/2020     01/27/2020    CO2 36

## 2020-01-27 NOTE — CONSULTS
[facility]    PATIENT'S NAME: Arabella Ortega   ATTENDING PHYSICIAN: Stephanie Prieto DO   CONSULTING PHYSICIAN: Vinay Roper MD   PATIENT ACCOUNT#:   [id number]    LOCATION:  2W [address] #:   [id number]       DATE OF BIR nurses, but will need to discuss further with the physicians with the cardiac team to make sure that the timing of surgery is reasonable and he is stable from a cardiac standpoint.     A total of 35 minutes was spent with this patient with greater than 50%

## 2020-01-27 NOTE — PROGRESS NOTES
[place] [facility]    Progress Note    Judithmikael Sosa Patient Status:  Inpatient    1940 MRN [id number]   Location [facility] 2W/SW Attending Minna Horner,    Hosp Day # 16 PCP No primary care provider on file.         Sub activity      H/o   Infection of prosthetic total hip joint  ID and ortho following / cefepime and daptomycin      9-anemia / hgb now stable   S/p blood transfusion   dtsble  F/u and keep hgb > 8      10- ZEYAD and obesity   Now intubated      11–severe deco

## 2020-01-27 NOTE — PHYSICAL THERAPY NOTE
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 He is now s/p L DEVIN revision (posterior precautions, WBAT), s/p urgent CABG, cardiac arrest, and prolonged intubation. He is motivated to work with therapy.       DISCHARGE RECOMMENDATIONS  PT Discharge Recommendations: Acute rehabilitation     JOHNSON Assistance: Not tested  Comment : (/)    THERAPEUTIC EXERCISES  Lower Extremity Ankle pumps  Quad sets     Position Supine       Patient End of Session: In bed;Needs met;Call light within reach;RN aware of session/findings; All patient questions and concern

## 2020-01-27 NOTE — PROGRESS NOTES
[facility] ID PROGRESS NOTE    Marvene Lilian Patient Status:  Inpatient    1940 MRN [id number]   Location [facility] 2W/SW Attending Concetta Boateng Angella Day # 16 PCP No primary care provider on file.      Subjective:  Remains intuba knees. Had L THR originally in 2015 and then had to be revised due to dislocation in 2016. Per pt, since that time has had pain on and off, but more recently.  In Jan 2019 had a hip aspiration and this showed staph epi and pt was seen by another ID MD. At t

## 2020-01-27 NOTE — CONSULTS
[facility]   Gastroenterology Consultation Note    Isidro Lara  Patient Status:    Inpatient  Date of Admission:         1/10/2020, Hospital day #17  Attending:   Jhony Melchor DO  PCP:     No primary care provider on file. MD at [facility] MAIN OR   • SPINE SURGERY PROCEDURE UNLISTED     • TOTAL HIP REPLACEMENT Left    • TOTAL KNEE REPLACEMENT Bilateral      History reviewed. No pertinent family history. reports that he quit smoking about 3 weeks ago.  He has a 2.50 pack-year smok PRN  •  HYDROcodone-acetaminophen (NORCO) 5-325 MG per tab 1 tablet, 1 tablet, Oral, Q4H PRN  •  ALPRAZolam (XANAX) tab 0.25 mg, 0.25 mg, Oral, TID PRN  •  Normal Saline Flush 0.9 % injection 10 mL, 10 mL, Intravenous, PRN  •  0.9% NaCl infusion, , Braxton Gan Daily  •  Vitamin C tab 500 mg, 500 mg, Oral, TID  •  acetaminophen (TYLENOL EXTRA STRENGTH) tab 500 mg, 500 mg, Oral, Q6H PRN  •  aspirin EC tab 81 mg, 81 mg, Oral, Daily  •  amiodarone HCl (PACERONE) tab 200 mg, 200 mg, Oral, TID CC  •  vancomycin HCl (F 01/27/2020    CREATSERUM 2.86 01/27/2020    BUN 83 01/27/2020     01/27/2020    K 3.7 01/27/2020     01/27/2020    CO2 36.0 01/27/2020     01/27/2020    CA 7.9 01/27/2020    PTT 64.7 01/27/2020       Imaging:  Xr Chest Ap Portable  (cpt= his head \"yes/no\" however family Edvin Em - wife) assists with history. #Anemia: Hgb down-trending likely multi-factorial; normocytic. Transfuse as needed. Noted negative occult blood stool 1/20. Ferritin 1/12 >20, iron/sat dec same day.  Noted patient

## 2020-01-27 NOTE — PLAN OF CARE
Alert and responsive. Playing music on his phone . Communicating well with mouthing words and gestures. Needs frequent repositioning for comfort and from sliding down on air mattress. Does not like blood pressure cuff, pulse ox or SCd on .  Removes frequen care  Description  Interventions:  - What would you like us to know as we care for you?  \"per daughter, we have a caregiver for him from 9pm to 9am\"  - Provide timely, complete, and accurate information to patient/family  - Incorporate patient and family falls.  - Oliver fall precautions as indicated by assessment.  - Educate pt/family on patient safety including physical limitations  - Instruct pt to call for assistance with activity based on assessment  - Modify environment to reduce risk of injury  - and trends  - Monitor for bleeding, hypotension and signs of decreased cardiac output  - Evaluate effectiveness of vasoactive medications to optimize hemodynamic stability  - Monitor arterial and/or venous puncture sites for bleeding and/or hematoma  - Ass and initiate nutrition consult as needed  - Instruct patient on self management of diabetes  Outcome: Progressing  Goal: Electrolytes maintained within normal limits  Description  INTERVENTIONS:  - Monitor labs and rhythm and assess patient for signs and s ordered activity level  Outcome: Progressing  Goal: Maintain proper alignment of affected body part  Description  INTERVENTIONS:  - Support and protect limb and body alignment per provider's orders  - Instruct and reinforce with patient and family use of a

## 2020-01-27 NOTE — PROGRESS NOTES
[facility]    Progress Note    Frederic Quigley Patient Status:  Inpatient    1940 MRN [id number]   Location [facility] 2W/SW Attending Felix Land,    Hosp Day # 16 PCP No primary care provider on file.        Subjec 01/25/2020    INR 1.29 (H) 01/18/2020    TSH 3.250 01/14/2020    MG 3.0 (H) 01/26/2020    PHOS 4.1 01/26/2020    TROP 4.030 (HH) 01/17/2020     01/21/2020       Xr Chest Ap Portable  (cpt=71045)    Result Date: 1/27/2020  CONCLUSION:  1.  Essentially

## 2020-01-28 ENCOUNTER — ANESTHESIA EVENT (OUTPATIENT)
Dept: SURGERY | Facility: HOSPITAL | Age: 80
DRG: 003 | End: 2020-01-28
Payer: MEDICARE

## 2020-01-28 ENCOUNTER — ANESTHESIA (OUTPATIENT)
Dept: SURGERY | Facility: HOSPITAL | Age: 80
DRG: 003 | End: 2020-01-28
Payer: MEDICARE

## 2020-01-28 ENCOUNTER — APPOINTMENT (OUTPATIENT)
Dept: GENERAL RADIOLOGY | Facility: HOSPITAL | Age: 80
DRG: 003 | End: 2020-01-28
Attending: INTERNAL MEDICINE
Payer: MEDICARE

## 2020-01-28 LAB
ANION GAP SERPL CALC-SCNC: 4 MMOL/L (ref 0–18)
APTT PPP: 36.3 SECONDS (ref 23.2–35.3)
BASOPHILS # BLD AUTO: 0.03 X10(3) UL (ref 0–0.2)
BASOPHILS NFR BLD AUTO: 0.3 %
BLOOD TYPE BARCODE: 5100
BUN BLD-MCNC: 71 MG/DL (ref 7–18)
BUN/CREAT SERPL: 26.9 (ref 10–20)
CALCIUM BLD-MCNC: 8.1 MG/DL (ref 8.5–10.1)
CHLORIDE SERPL-SCNC: 110 MMOL/L (ref 98–112)
CO2 SERPL-SCNC: 34 MMOL/L (ref 21–32)
CREAT BLD-MCNC: 2.64 MG/DL (ref 0.7–1.3)
DEPRECATED RDW RBC AUTO: 59.1 FL (ref 35.1–46.3)
EOSINOPHIL # BLD AUTO: 0.61 X10(3) UL (ref 0–0.7)
EOSINOPHIL NFR BLD AUTO: 6.5 %
ERYTHROCYTE [DISTWIDTH] IN BLOOD BY AUTOMATED COUNT: 17.1 % (ref 11–15)
GLUCOSE BLD-MCNC: 121 MG/DL (ref 70–99)
GLUCOSE BLDC GLUCOMTR-MCNC: 121 MG/DL (ref 70–99)
GLUCOSE BLDC GLUCOMTR-MCNC: 129 MG/DL (ref 70–99)
GLUCOSE BLDC GLUCOMTR-MCNC: 140 MG/DL (ref 70–99)
HCT VFR BLD AUTO: 29.6 % (ref 39–53)
HGB BLD-MCNC: 9.1 G/DL (ref 13–17.5)
IMM GRANULOCYTES # BLD AUTO: 0.08 X10(3) UL (ref 0–1)
IMM GRANULOCYTES NFR BLD: 0.9 %
INR BLD: 1.2 (ref 0.9–1.2)
LYMPHOCYTES # BLD AUTO: 0.86 X10(3) UL (ref 1–4)
LYMPHOCYTES NFR BLD AUTO: 9.2 %
MCH RBC QN AUTO: 29.7 PG (ref 26–34)
MCHC RBC AUTO-ENTMCNC: 30.7 G/DL (ref 31–37)
MCV RBC AUTO: 96.7 FL (ref 80–100)
MONOCYTES # BLD AUTO: 0.79 X10(3) UL (ref 0.1–1)
MONOCYTES NFR BLD AUTO: 8.5 %
NEUTROPHILS # BLD AUTO: 6.95 X10 (3) UL (ref 1.5–7.7)
NEUTROPHILS # BLD AUTO: 6.95 X10(3) UL (ref 1.5–7.7)
NEUTROPHILS NFR BLD AUTO: 74.6 %
OSMOLALITY SERPL CALC.SUM OF ELEC: 328 MOSM/KG (ref 275–295)
PLATELET # BLD AUTO: 316 10(3)UL (ref 150–450)
POTASSIUM SERPL-SCNC: 3.9 MMOL/L (ref 3.5–5.1)
PROTHROMBIN TIME: 15.1 SECONDS (ref 11.8–14.5)
RBC # BLD AUTO: 3.06 X10(6)UL (ref 3.8–5.8)
SODIUM SERPL-SCNC: 148 MMOL/L (ref 136–145)
WBC # BLD AUTO: 9.3 X10(3) UL (ref 4–11)

## 2020-01-28 PROCEDURE — 3E0G76Z INTRODUCTION OF NUTRITIONAL SUBSTANCE INTO UPPER GI, VIA NATURAL OR ARTIFICIAL OPENING: ICD-10-PCS | Performed by: INTERNAL MEDICINE

## 2020-01-28 PROCEDURE — 43246 EGD PLACE GASTROSTOMY TUBE: CPT | Performed by: INTERNAL MEDICINE

## 2020-01-28 PROCEDURE — 99233 SBSQ HOSP IP/OBS HIGH 50: CPT | Performed by: INTERNAL MEDICINE

## 2020-01-28 PROCEDURE — 0DH63UZ INSERTION OF FEEDING DEVICE INTO STOMACH, PERCUTANEOUS APPROACH: ICD-10-PCS | Performed by: INTERNAL MEDICINE

## 2020-01-28 PROCEDURE — 0B110F4 BYPASS TRACHEA TO CUTANEOUS WITH TRACHEOSTOMY DEVICE, OPEN APPROACH: ICD-10-PCS | Performed by: OTOLARYNGOLOGY

## 2020-01-28 PROCEDURE — 31600 PLANNED TRACHEOSTOMY: CPT | Performed by: OTOLARYNGOLOGY

## 2020-01-28 PROCEDURE — 71045 X-RAY EXAM CHEST 1 VIEW: CPT | Performed by: INTERNAL MEDICINE

## 2020-01-28 RX ORDER — HYDROCODONE BITARTRATE AND ACETAMINOPHEN 5; 325 MG/1; MG/1
1 TABLET ORAL AS NEEDED
Status: DISCONTINUED | OUTPATIENT
Start: 2020-01-28 | End: 2020-01-28 | Stop reason: HOSPADM

## 2020-01-28 RX ORDER — PROCHLORPERAZINE EDISYLATE 5 MG/ML
5 INJECTION INTRAMUSCULAR; INTRAVENOUS ONCE AS NEEDED
Status: DISCONTINUED | OUTPATIENT
Start: 2020-01-28 | End: 2020-01-28 | Stop reason: HOSPADM

## 2020-01-28 RX ORDER — MORPHINE SULFATE 4 MG/ML
4 INJECTION, SOLUTION INTRAMUSCULAR; INTRAVENOUS EVERY 10 MIN PRN
Status: DISCONTINUED | OUTPATIENT
Start: 2020-01-28 | End: 2020-01-28 | Stop reason: HOSPADM

## 2020-01-28 RX ORDER — HYDROCODONE BITARTRATE AND ACETAMINOPHEN 5; 325 MG/1; MG/1
2 TABLET ORAL AS NEEDED
Status: DISCONTINUED | OUTPATIENT
Start: 2020-01-28 | End: 2020-01-28 | Stop reason: HOSPADM

## 2020-01-28 RX ORDER — NALOXONE HYDROCHLORIDE 0.4 MG/ML
80 INJECTION, SOLUTION INTRAMUSCULAR; INTRAVENOUS; SUBCUTANEOUS AS NEEDED
Status: DISCONTINUED | OUTPATIENT
Start: 2020-01-28 | End: 2020-01-28 | Stop reason: HOSPADM

## 2020-01-28 RX ORDER — MORPHINE SULFATE 10 MG/ML
6 INJECTION, SOLUTION INTRAMUSCULAR; INTRAVENOUS EVERY 10 MIN PRN
Status: DISCONTINUED | OUTPATIENT
Start: 2020-01-28 | End: 2020-01-28 | Stop reason: HOSPADM

## 2020-01-28 RX ORDER — MORPHINE SULFATE 2 MG/ML
2 INJECTION, SOLUTION INTRAMUSCULAR; INTRAVENOUS EVERY 10 MIN PRN
Status: DISCONTINUED | OUTPATIENT
Start: 2020-01-28 | End: 2020-01-28 | Stop reason: HOSPADM

## 2020-01-28 RX ORDER — LIDOCAINE HYDROCHLORIDE AND EPINEPHRINE 10; 10 MG/ML; UG/ML
INJECTION, SOLUTION INFILTRATION; PERINEURAL AS NEEDED
Status: DISCONTINUED | OUTPATIENT
Start: 2020-01-28 | End: 2020-01-28 | Stop reason: HOSPADM

## 2020-01-28 RX ORDER — METOPROLOL TARTRATE 5 MG/5ML
2.5 INJECTION INTRAVENOUS ONCE
Status: DISCONTINUED | OUTPATIENT
Start: 2020-01-28 | End: 2020-01-28 | Stop reason: HOSPADM

## 2020-01-28 RX ORDER — ONDANSETRON 2 MG/ML
4 INJECTION INTRAMUSCULAR; INTRAVENOUS ONCE AS NEEDED
Status: DISCONTINUED | OUTPATIENT
Start: 2020-01-28 | End: 2020-01-28 | Stop reason: HOSPADM

## 2020-01-28 RX ORDER — SODIUM CHLORIDE, SODIUM LACTATE, POTASSIUM CHLORIDE, CALCIUM CHLORIDE 600; 310; 30; 20 MG/100ML; MG/100ML; MG/100ML; MG/100ML
INJECTION, SOLUTION INTRAVENOUS CONTINUOUS
Status: DISCONTINUED | OUTPATIENT
Start: 2020-01-28 | End: 2020-01-28

## 2020-01-28 RX ORDER — HYDROMORPHONE HYDROCHLORIDE 1 MG/ML
0.6 INJECTION, SOLUTION INTRAMUSCULAR; INTRAVENOUS; SUBCUTANEOUS EVERY 5 MIN PRN
Status: DISCONTINUED | OUTPATIENT
Start: 2020-01-28 | End: 2020-01-28 | Stop reason: HOSPADM

## 2020-01-28 RX ORDER — HYDRALAZINE HYDROCHLORIDE 20 MG/ML
10 INJECTION INTRAMUSCULAR; INTRAVENOUS EVERY 4 HOURS PRN
Status: DISCONTINUED | OUTPATIENT
Start: 2020-01-28 | End: 2020-02-04

## 2020-01-28 RX ORDER — HYDROMORPHONE HYDROCHLORIDE 1 MG/ML
0.2 INJECTION, SOLUTION INTRAMUSCULAR; INTRAVENOUS; SUBCUTANEOUS EVERY 5 MIN PRN
Status: DISCONTINUED | OUTPATIENT
Start: 2020-01-28 | End: 2020-01-28 | Stop reason: HOSPADM

## 2020-01-28 RX ORDER — HYDROMORPHONE HYDROCHLORIDE 1 MG/ML
0.4 INJECTION, SOLUTION INTRAMUSCULAR; INTRAVENOUS; SUBCUTANEOUS EVERY 5 MIN PRN
Status: DISCONTINUED | OUTPATIENT
Start: 2020-01-28 | End: 2020-01-28 | Stop reason: HOSPADM

## 2020-01-28 RX ORDER — EPHEDRINE SULFATE 50 MG/ML
INJECTION, SOLUTION INTRAVENOUS AS NEEDED
Status: DISCONTINUED | OUTPATIENT
Start: 2020-01-28 | End: 2020-01-28 | Stop reason: SURG

## 2020-01-28 RX ORDER — ROCURONIUM BROMIDE 10 MG/ML
INJECTION, SOLUTION INTRAVENOUS AS NEEDED
Status: DISCONTINUED | OUTPATIENT
Start: 2020-01-28 | End: 2020-01-28 | Stop reason: SURG

## 2020-01-28 RX ORDER — HALOPERIDOL 5 MG/ML
0.25 INJECTION INTRAMUSCULAR ONCE AS NEEDED
Status: DISCONTINUED | OUTPATIENT
Start: 2020-01-28 | End: 2020-01-28 | Stop reason: HOSPADM

## 2020-01-28 RX ORDER — ASPIRIN 300 MG
300 SUPPOSITORY, RECTAL RECTAL ONCE
Status: COMPLETED | OUTPATIENT
Start: 2020-01-28 | End: 2020-01-28

## 2020-01-28 RX ADMIN — CEFAZOLIN SODIUM/WATER 2 G: 2 G/20 ML SYRINGE (ML) INTRAVENOUS at 07:53:00

## 2020-01-28 RX ADMIN — EPHEDRINE SULFATE 10 MG: 50 INJECTION, SOLUTION INTRAVENOUS at 07:49:00

## 2020-01-28 RX ADMIN — ROCURONIUM BROMIDE 50 MG: 10 INJECTION, SOLUTION INTRAVENOUS at 07:42:00

## 2020-01-28 RX ADMIN — ROCURONIUM BROMIDE 20 MG: 10 INJECTION, SOLUTION INTRAVENOUS at 07:45:00

## 2020-01-28 NOTE — CM/SW NOTE
Care Management/Discharge Planning:    Pt with new trach/PEG and still on vent and will need LTACH placement prior to Acute rehab. Per [facility], patient will be ready for discharge later this week.    Discussed this level of care with patien

## 2020-01-28 NOTE — BRIEF OP NOTE
Pre-Operative Diagnosis: respiratory failure     Post-Operative Diagnosis: respiratory failure      Procedure Performed:   Procedure(s):  tracheostomy    Surgeon(s) and Role:     * Marva Almonte MD - Primary    Assistant(s):        Surgical Findings: nor

## 2020-01-28 NOTE — OPERATIVE REPORT
VERONICA SAINI [facility] Endoscopy Report      Preoperative Diagnosis:  - PEG placement      Postoperative Diagnosis:  - successful PEG placement  - gastritis      Procedure:    Esophagogastroduodenoscopy with 20F PEG tube placement      Surgeon:  Nita Lay to feeding tube.   PEG tube placement as outlined above   The duodenal bulb and post bulbar regions were normal.    Recommendations:  - Post PEG orders placed  - Hold heparin as per primary service   - No use of tube until cleared by GI tomorrow  - abdomina

## 2020-01-28 NOTE — PLAN OF CARE
From OR at 0914 post trach Portex  #8  and PEG. Peg at dot 2 and secured. Abdominal binder applied. Not to be used until tomorrow. Trach secured with additional Foam ties to keep Vent from coming off. Trach supplies at bedside.  Small amount bleeding from T caregiver for him from 9pm to 9am\"  - Provide timely, complete, and accurate information to patient/family  - Incorporate patient and family knowledge, values, beliefs, and cultural backgrounds into the planning and delivery of care  - Encourage patient/f physical limitations  - Instruct pt to call for assistance with activity based on assessment  - Modify environment to reduce risk of injury  - Provide assistive devices as appropriate  - Consider OT/PT consult to assist with strengthening/mobility  - Encou Monitor for bleeding, hypotension and signs of decreased cardiac output  - Evaluate effectiveness of vasoactive medications to optimize hemodynamic stability  - Monitor arterial and/or venous puncture sites for bleeding and/or hematoma  - Assess quality of maintain glucose within target range  - Assess barriers to adequate nutritional intake and initiate nutrition consult as needed  - Instruct patient on self management of diabetes  Outcome: Progressing  Goal: Electrolytes maintained within normal limits  [place] during mobilization  - Obtain PT/OT consults as needed  - Instruct patient/family in ordered activity level  Outcome: Progressing  Goal: Maintain proper alignment of affected body part  Description  INTERVENTIONS:  - Support and protect limb and body align

## 2020-01-28 NOTE — RESPIRATORY THERAPY NOTE
Received patient intubated on full support. Current settings: AC16/500/50%/+5. Suction provided as needed. No acute resp events overnight. RT will continue to monitor.

## 2020-01-28 NOTE — ANESTHESIA PREPROCEDURE EVALUATION
Anesthesia PreOp Note    HPI:     Roxanne Donato is a 78year old male who presents for preoperative consultation requested by: Shani Veras MD    Date of Surgery: 1/10/2020 - 1/28/2020    Procedure(s):  TRACHEOSTOMY  Indication: respiratory failure (two) times daily with meals. , Disp: , Rfl: , 1/10/2020 at 0600  aspirin 81 MG Oral Tab, Take 81 mg by mouth daily. , Disp: , Rfl: , 12/30/2019  hydrochlorothiazide 25 MG Oral Tab, Take 25 mg by mouth daily. , Disp: , Rfl: , 1/9/2020 at 0800  Pantoprazole mL, Nebulization, BID, Raslan, Janyce Krabbe, MD, 2 mL at 01/27/20 0818  nystatin (MYCOSTATIN) 931003 UNIT/GM cream, , Topical, BID, Cristi Araiza, NP  sodium bicarbonate tab 325 mg, 325 mg, Feeding Tube, PRN, Antonino Campa MD, 325 mg at 01/22/20 0947    A MARLEY Lu  0.9% NaCl infusion, , Intravenous, Continuous, Tracy Julien MD, Last Rate: 5 mL/hr at 01/27/20 0800  HYDROmorphone HCl (DILAUDID) 1 MG/ML injection 0.2 mg, 0.2 mg, Intravenous, Q2H PRN, Angeles Katz APRN, 0.2 mg at 01/22/2 Intravenous, Q6H PRN, Angeles Mckinley APRN, 4 mg at 01/16/20 1901  potassium chloride IVPB premix 20 mEq, 20 mEq, Intravenous, PRN, MARLEY Slater, Last Rate: 100 mL/hr at 01/18/20 0937, 20 mEq at 01/18/20 0937    Or  potassium chloride IVPB carmen MARLEY Rae, 10 mg at 01/27/20 2016  [facility] Hold] dextrose 50 % injection 50 mL, 50 mL, Intravenous, PRN, Anisa Boateng MD  [MAR Hold] Glucose-Vitamin C (DEX-4) chewable tab 4 tablet, 4 tablet, Oral, Q15 Min PRN, Amor Boateng Postin organization: Not on file        Attends meetings of clubs or organizations: Not on file        Relationship status: Not on file      Intimate partner violence:        Fear of current or ex partner: Not on file        Emotionally abused: Not on file Rhythm: regular    Neuro/Psych    (+)  depression,       GI/Hepatic/Renal - negative ROS     Endo/Other - negative ROS   Abdominal  - normal exam               Anesthesia Plan:   ASA:  4  Plan:   General  Airway:  ETT  Informed Consent Plan and Risks D

## 2020-01-28 NOTE — PROGRESS NOTES
[facility]    Progress Note    Susannah Bhakta Patient Status:  Inpatient    1940 MRN [id number]   Location [place] [address] Attending [facility], [address] Day # 18 PCP No primary care provider on file. TSH 3.250 01/14/2020    MG 3.0 (H) 01/26/2020    PHOS 4.1 01/26/2020    TROP 4.030 (HH) 01/17/2020     01/27/2020       Xr Chest Ap Portable  (cpt=71045)    Result Date: 1/27/2020  CONCLUSION:  1.  Essentially unchanged chest read demonstrating mild c

## 2020-01-28 NOTE — PROGRESS NOTES
[facility]    Progress Note    Roxannedaniella Hauserlander Patient Status:  Inpatient    1940 MRN [id number]   Location [facility] 2W/SW Attending Silvia Joseph,    Hosp Day # 18 PCP No primary care provider on file.      Subjec extremities    Assessment/Plan:  S/P CABG x 3 POD # 14  Resp. Failure/cardiac arrest (bradycardia-PEA-CPR) on 1/16/20- s/p trach placement today.  Mgt per Pulm   Increase activity; working with PT/OT  Pain meds as needed  Scds and anticoag prophylaxis DVT p

## 2020-01-28 NOTE — PROGRESS NOTES
[place] [facility]    Progress Note    Reshma Fontanez Patient Status:  Inpatient    1940 MRN [id number]   Location [facility] 2W/SW Attending Julieth Brooks DO   Hosp Day # 18 PCP No primary care provider on file.         Sub ATN    good UO     start to improve   Renal following      8-s/p revision of total replacement of left hip joint on 1/10/2020  Prior multiple hip surgery and back surgery recently   Prolonged deconditioning and limited activity      H/o   Infection of pros Portable  (cpt=71045)    Result Date: 1/27/2020  CONCLUSION:  1. Essentially unchanged chest read demonstrating mild cardiomegaly and normal pulmonary vascularity.   Moderate left base consolidation/atelectasis with suspect posterior layering left pleural e

## 2020-01-28 NOTE — PHYSICAL THERAPY NOTE
Chart reviewed, pt currently off the floor for PEG and tracheostomy placement. Will need new orders post-op when appropriate. Will f/u tomorrow.

## 2020-01-28 NOTE — ANESTHESIA POSTPROCEDURE EVALUATION
Patient: Cristina Screws    Procedure Summary     Date:  01/28/20 Room / Location:  [facility] OR [address] [place] [address] OR    Anesthesia Start:  9141 Anesthesia Stop:      Procedure:  TRACHEOSTOMY (N/A ) Diagnosis:  (respiratory failure)    Surgeon:  Jayce Floyd

## 2020-01-29 LAB
ALBUMIN SERPL-MCNC: 2 G/DL (ref 3.4–5)
ANION GAP SERPL CALC-SCNC: 2 MMOL/L (ref 0–18)
APTT PPP: 59.6 SECONDS (ref 23.2–35.3)
BUN BLD-MCNC: 65 MG/DL (ref 7–18)
BUN/CREAT SERPL: 22.3 (ref 10–20)
CALCIUM BLD-MCNC: 8.4 MG/DL (ref 8.5–10.1)
CHLORIDE SERPL-SCNC: 108 MMOL/L (ref 98–112)
CO2 SERPL-SCNC: 36 MMOL/L (ref 21–32)
CREAT BLD-MCNC: 2.92 MG/DL (ref 0.7–1.3)
DEPRECATED RDW RBC AUTO: 59.4 FL (ref 35.1–46.3)
ERYTHROCYTE [DISTWIDTH] IN BLOOD BY AUTOMATED COUNT: 17.1 % (ref 11–15)
GLUCOSE BLD-MCNC: 119 MG/DL (ref 70–99)
GLUCOSE BLDC GLUCOMTR-MCNC: 118 MG/DL (ref 70–99)
GLUCOSE BLDC GLUCOMTR-MCNC: 119 MG/DL (ref 70–99)
GLUCOSE BLDC GLUCOMTR-MCNC: 139 MG/DL (ref 70–99)
HCT VFR BLD AUTO: 30.7 % (ref 39–53)
HGB BLD-MCNC: 9.4 G/DL (ref 13–17.5)
MCH RBC QN AUTO: 29.5 PG (ref 26–34)
MCHC RBC AUTO-ENTMCNC: 30.6 G/DL (ref 31–37)
MCV RBC AUTO: 96.2 FL (ref 80–100)
OSMOLALITY SERPL CALC.SUM OF ELEC: 322 MOSM/KG (ref 275–295)
PHOSPHATE SERPL-MCNC: 3.8 MG/DL (ref 2.5–4.9)
PLATELET # BLD AUTO: 312 10(3)UL (ref 150–450)
POTASSIUM SERPL-SCNC: 4.2 MMOL/L (ref 3.5–5.1)
RBC # BLD AUTO: 3.19 X10(6)UL (ref 3.8–5.8)
SODIUM SERPL-SCNC: 146 MMOL/L (ref 136–145)
WBC # BLD AUTO: 9.6 X10(3) UL (ref 4–11)

## 2020-01-29 PROCEDURE — 99232 SBSQ HOSP IP/OBS MODERATE 35: CPT | Performed by: INTERNAL MEDICINE

## 2020-01-29 PROCEDURE — 99231 SBSQ HOSP IP/OBS SF/LOW 25: CPT | Performed by: OTOLARYNGOLOGY

## 2020-01-29 PROCEDURE — 99233 SBSQ HOSP IP/OBS HIGH 50: CPT | Performed by: INTERNAL MEDICINE

## 2020-01-29 RX ORDER — WARFARIN SODIUM 5 MG/1
5 TABLET ORAL ONCE
Status: DISCONTINUED | OUTPATIENT
Start: 2020-01-29 | End: 2020-01-29 | Stop reason: SDUPTHER

## 2020-01-29 RX ORDER — ASPIRIN 81 MG/1
81 TABLET, CHEWABLE ORAL DAILY
Status: DISCONTINUED | OUTPATIENT
Start: 2020-01-29 | End: 2020-02-04

## 2020-01-29 RX ORDER — CARVEDILOL 3.12 MG/1
3.12 TABLET ORAL 2 TIMES DAILY WITH MEALS
Status: DISCONTINUED | OUTPATIENT
Start: 2020-01-29 | End: 2020-02-04

## 2020-01-29 NOTE — PROGRESS NOTES
[facility]     Gastroenterology Progress Note     [place] Patient Status:  Inpatient    1940 MRN [id number]   Location [place] [address]W/SW Attending Mian Banuelos, [address] Day # 19 PCP No primary care prov 1200 135/69 97.9 °F (36.6 °C) Bladder 78 14 99 % —   01/28/20 1100 94/83 97.9 °F (36.6 °C) — 77 18 100 % —   01/28/20 1030 (!) 170/83 97.9 °F (36.6 °C) Bladder 76 20 100 % —   01/28/20 1015 (!) 183/92 97.7 °F (36.5 °C) — 76 19 100 % —   01/28/20 1000 (!) 1 Portable  (cpt=71045)    Result Date: 1/28/2020  CONCLUSION:  1. Postthoracotomy chest demonstrating cardiomegaly and moderate layering left pleural effusion and associated compressive atelectasis, with or without superimposed pneumonia.   2. Endotracheal t

## 2020-01-29 NOTE — PROGRESS NOTES
[facility]    Cardiology Progress Note    Guy Muro Patient Status:  Inpatient    1940 MRN [id number]   Location [facility] 2W/SW Attending Gilberto Post,    Hosp Day # 23 PCP No primary care provider on file. Sodium  50 mcg Oral Before breakfast   • atorvastatin  10 mg Oral Nightly   • tamsulosin HCl  0.8 mg Oral Nightly       Continuous Infusions:   • dexmedetomidine Stopped (01/29/20 0450)   • Heparin Sod (Porcine) in D5W 1,000 Units/hr (01/29/20 0800)   • de

## 2020-01-29 NOTE — PROGRESS NOTES
[facility] Note: Route Optimization for Pantoprazole (PROTONIX)    Patient is currently on Pantoprazole (PROTONIX) 40 mg IV every 24 hours.    The patient meets the criteria to convert to the oral equivalent as established by the IV to Oral conversion pro

## 2020-01-29 NOTE — PROGRESS NOTES
Doing well following his tracheostomy. No bleeding. Ventilating well. Comfortable with no pain. Exam:  Td Welch site clean, packing in place    Assessment and plan:  Doing well following his tracheotomy. Continue observation.  Plan to remove packing stella

## 2020-01-29 NOTE — RESPIRATORY THERAPY NOTE
Pt received on full vent support and Trach Portex 8. Vent mode: AC 16/500/+5/40%. Suction provided as indicated. No changes made overnight. RT to continue to monitor.

## 2020-01-29 NOTE — PLAN OF CARE
Pt with trach & PEG placement on 1/28. Tolerating well. No c/o pain from either site. Awake & alert with no deficits noted other than generalized weakness. He is able to call for assistance with the call light. Does not attempt to get oob.  Turning q2h & NC Educate pt/family on patient safety including physical limitations  - Instruct pt to call for assistance with activity based on assessment  - Modify environment to reduce risk of injury  - Provide assistive devices as appropriate  - Consider OT/PT consult decreased cardiac output  - Evaluate effectiveness of vasoactive medications to optimize hemodynamic stability  - Monitor arterial and/or venous puncture sites for bleeding and/or hematoma  - Assess quality of pulses, skin color and temperature  - Assess f self management of diabetes  Outcome: Progressing  Goal: Electrolytes maintained within normal limits  Description  INTERVENTIONS:  - Monitor labs and rhythm and assess patient for signs and symptoms of electrolyte imbalances  - Administer electrolyte repl

## 2020-01-29 NOTE — PROGRESS NOTES
[facility]    Progress Note    Carolinatony Martins Patient Status:  Inpatient    1940 MRN [id number]   Location [facility] 2W/SW Attending Ramandeep Patel DO   Hosp Day # 23 PCP No primary care provider on file.         Sub wean / SBT with cap/ps / on 40 % Fio2 with 99 % sat   Start G-tube feeding today         4- VTE   + DVT   Continue with IV heparin     5- s/p left pleural effusion   Drained and  better      6- severe pulmonary HTN   PAP 67 / no RV strain on echo      7- a Endotracheal tube terminating 1.9 cm above the deana. 3. Additional support tubes and lines as above.     Dictated by (CST): Lance Dandy, MD on 1/28/2020 at 7:49     Approved by (CST): Lance Dandy, MD on 1/28/2020 at 7:52                       Isabel

## 2020-01-29 NOTE — PROGRESS NOTES
[facility]    Progress Note    Deandre Paredes Patient Status:  Inpatient    1940 MRN [id number]   Location [facility] 2W/SW Attending Anamika Mendoza, DO   Hosp Day # 23 PCP No primary care provider on file.        Subjec 01/29/2020    INR 1.20 01/28/2020    TSH 3.250 01/14/2020    MG 3.0 (H) 01/26/2020    PHOS 3.8 01/29/2020    TROP 4.030 (HH) 01/17/2020     01/27/2020       Xr Chest Ap Portable  (cpt=71045)    Result Date: 1/28/2020  CONCLUSION:  1.  Postthoracotomy

## 2020-01-29 NOTE — PROGRESS NOTES
[facility]    Progress Note    Melly Vizcaino Patient Status:  Inpatient    1940 MRN [id number]   Location [facility] 2W/SW Attending Nydia Palacios DO   Hosp Day # 23 PCP No primary care provider on file.      Subjec commands    Assessment/Plan:  S/P CABG x 3 POD # 15  Resp. Failure/cardiac arrest (bradycardia-PEA-CPR) on 1/16/20- s/p trach placement today.  Mgt per Pulm   Increase activity; working with PT/OT  Pain meds as needed  Scds and anticoag prophylaxis DVT prev

## 2020-01-30 ENCOUNTER — APPOINTMENT (OUTPATIENT)
Dept: CT IMAGING | Facility: HOSPITAL | Age: 80
DRG: 003 | End: 2020-01-30
Attending: INTERNAL MEDICINE
Payer: MEDICARE

## 2020-01-30 ENCOUNTER — APPOINTMENT (OUTPATIENT)
Dept: GENERAL RADIOLOGY | Facility: HOSPITAL | Age: 80
DRG: 003 | End: 2020-01-30
Attending: NURSE PRACTITIONER
Payer: MEDICARE

## 2020-01-30 ENCOUNTER — TELEPHONE (OUTPATIENT)
Dept: GASTROENTEROLOGY | Facility: CLINIC | Age: 80
End: 2020-01-30

## 2020-01-30 LAB
ANION GAP SERPL CALC-SCNC: 4 MMOL/L (ref 0–18)
APTT PPP: 61.3 SECONDS (ref 23.2–35.3)
BASOPHILS # BLD AUTO: 0.02 X10(3) UL (ref 0–0.2)
BASOPHILS NFR BLD AUTO: 0.3 %
BUN BLD-MCNC: 60 MG/DL (ref 7–18)
BUN/CREAT SERPL: 23.6 (ref 10–20)
CALCIUM BLD-MCNC: 8.6 MG/DL (ref 8.5–10.1)
CHLORIDE SERPL-SCNC: 109 MMOL/L (ref 98–112)
CO2 SERPL-SCNC: 34 MMOL/L (ref 21–32)
CREAT BLD-MCNC: 2.54 MG/DL (ref 0.7–1.3)
DEPRECATED RDW RBC AUTO: 59.1 FL (ref 35.1–46.3)
EOSINOPHIL # BLD AUTO: 0.48 X10(3) UL (ref 0–0.7)
EOSINOPHIL NFR BLD AUTO: 6.1 %
ERYTHROCYTE [DISTWIDTH] IN BLOOD BY AUTOMATED COUNT: 16.9 % (ref 11–15)
GLUCOSE BLD-MCNC: 103 MG/DL (ref 70–99)
GLUCOSE BLDC GLUCOMTR-MCNC: 106 MG/DL (ref 70–99)
GLUCOSE BLDC GLUCOMTR-MCNC: 118 MG/DL (ref 70–99)
GLUCOSE BLDC GLUCOMTR-MCNC: 119 MG/DL (ref 70–99)
GLUCOSE BLDC GLUCOMTR-MCNC: 129 MG/DL (ref 70–99)
GLUCOSE BLDC GLUCOMTR-MCNC: 182 MG/DL (ref 70–99)
HCT VFR BLD AUTO: 30.6 % (ref 39–53)
HGB BLD-MCNC: 9.3 G/DL (ref 13–17.5)
IMM GRANULOCYTES # BLD AUTO: 0.06 X10(3) UL (ref 0–1)
IMM GRANULOCYTES NFR BLD: 0.8 %
INR BLD: 1.34 (ref 0.9–1.2)
LYMPHOCYTES # BLD AUTO: 0.95 X10(3) UL (ref 1–4)
LYMPHOCYTES NFR BLD AUTO: 12 %
MCH RBC QN AUTO: 29.5 PG (ref 26–34)
MCHC RBC AUTO-ENTMCNC: 30.4 G/DL (ref 31–37)
MCV RBC AUTO: 97.1 FL (ref 80–100)
MONOCYTES # BLD AUTO: 0.62 X10(3) UL (ref 0.1–1)
MONOCYTES NFR BLD AUTO: 7.8 %
NEUTROPHILS # BLD AUTO: 5.77 X10 (3) UL (ref 1.5–7.7)
NEUTROPHILS # BLD AUTO: 5.77 X10(3) UL (ref 1.5–7.7)
NEUTROPHILS NFR BLD AUTO: 73 %
OSMOLALITY SERPL CALC.SUM OF ELEC: 321 MOSM/KG (ref 275–295)
PLATELET # BLD AUTO: 312 10(3)UL (ref 150–450)
POTASSIUM SERPL-SCNC: 4.1 MMOL/L (ref 3.5–5.1)
PROTHROMBIN TIME: 16.5 SECONDS (ref 11.8–14.5)
RBC # BLD AUTO: 3.15 X10(6)UL (ref 3.8–5.8)
SODIUM SERPL-SCNC: 147 MMOL/L (ref 136–145)
WBC # BLD AUTO: 7.9 X10(3) UL (ref 4–11)

## 2020-01-30 PROCEDURE — 99232 SBSQ HOSP IP/OBS MODERATE 35: CPT | Performed by: INTERNAL MEDICINE

## 2020-01-30 PROCEDURE — 74176 CT ABD & PELVIS W/O CONTRAST: CPT | Performed by: INTERNAL MEDICINE

## 2020-01-30 PROCEDURE — 71250 CT THORAX DX C-: CPT | Performed by: INTERNAL MEDICINE

## 2020-01-30 PROCEDURE — 99233 SBSQ HOSP IP/OBS HIGH 50: CPT | Performed by: INTERNAL MEDICINE

## 2020-01-30 PROCEDURE — 74018 RADEX ABDOMEN 1 VIEW: CPT | Performed by: NURSE PRACTITIONER

## 2020-01-30 RX ORDER — METOCLOPRAMIDE HYDROCHLORIDE 5 MG/ML
INJECTION INTRAMUSCULAR; INTRAVENOUS
Status: COMPLETED
Start: 2020-01-30 | End: 2020-01-30

## 2020-01-30 RX ORDER — WARFARIN SODIUM 5 MG/1
5 TABLET ORAL
Status: COMPLETED | OUTPATIENT
Start: 2020-01-30 | End: 2020-01-30

## 2020-01-30 RX ORDER — METOCLOPRAMIDE HYDROCHLORIDE 5 MG/ML
5 INJECTION INTRAMUSCULAR; INTRAVENOUS EVERY 6 HOURS
Status: DISCONTINUED | OUTPATIENT
Start: 2020-01-30 | End: 2020-02-03

## 2020-01-30 NOTE — RESPIRATORY THERAPY NOTE
Pt received on vent settings PS/CPAP 10/5 ; Portex #8  Changed pt to full support @ 2140. Settings; 16/500/+5/40%  Pt tolerating and saturating appropriately. No acute changes overnight, RT to continue to monitor.     Switched to PS/CPAP @ 1643  Settings

## 2020-01-30 NOTE — PLAN OF CARE
Pt remained on CPAP throughout the evening with no c/o. Was put back on Full Support approx 2140 as per his request. He was quite fatigued from the day and was requesting to go to sleep. Precedex being used overnight to assist with comfort and sleep.  Stanley Spear distraction and/or relaxation techniques  - Monitor for opioid side effects  - Notify MD/LIP if interventions unsuccessful or patient reports new pain  - Anticipate increased pain with activity and pre-medicate as appropriate  Outcome: Progressing     Prob signs and symptoms of internal bleeding  - Monitor lab trends  - Patient is to report abnormal signs of bleeding to staff  - Avoid use of toothpicks and dental floss  - Use electric shaver for shaving  - Use soft bristle tooth brush  - Limit straining and Respiratory Therapy support as indicated  - Manage/alleviate anxiety  - Monitor for signs/symptoms of CO2 retention  Outcome: Progressing     Problem: METABOLIC  Goal: Glucose maintained within prescribed range  Description  INTERVENTIONS:  - Monitor Blood Progressing     Problem: Musculoskeletal  Goal: Maintain proper alignment of affected body part  Description  INTERVENTIONS:  - Support and protect limb and body alignment per provider's orders  - Instruct and reinforce with patient and family use of appro

## 2020-01-30 NOTE — PROGRESS NOTES
[facility]    Progress Note    Nina Whitehead Patient Status:  Inpatient    1940 MRN [id number]   Location [facility] 2W/SW Attending Tere Garber DO   Hosp Day # 20 PCP No primary care provider on file.          Jalen BID   • nystatin   Topical BID   • DAPTOmycin  6 mg/kg (Adjusted) Intravenous Q48H   • ferrous sulfate  300 mg Oral TID   • Insulin Regular Human  1-5 Units Subcutaneous 4 times per day   • Chlorhexidine Gluconate  15 mL Mouth/Throat [email or web address]   • nilesh

## 2020-01-30 NOTE — PROGRESS NOTES
[facility]    Progress Note      Assessment and Plan:   1. 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     Recommendations: Aggressive antipain, anti-thrombosis and ant and reactive to light and accommodation. Neck without adenopathy, thyromegaly, JVD nor bruit. Lungs diminished at left base to auscultation and percussion. Cardiac regular rate and rhythm no murmur.    Abdomen nontender, without hepatosplenomegaly and

## 2020-01-30 NOTE — PROGRESS NOTES
[facility]    Progress Note    Sallie Cook Patient Status:  Inpatient    1940 MRN [id number]   Location [facility] 2W/SW Attending Jazmin Yen DO   Hosp Day # 20 PCP No primary care provider on file.        Subjec * 119* 103*   BUN 71* 65* 60*   CREATSERUM 2.64* 2.92* 2.54*   GFRAA 25* 22* 26*   GFRNAA 22* 19* 23*   CA 8.1* 8.4* 8.6   * 146* 147*   K 3.9 4.2 4.1    108 109   CO2 34.0* 36.0* 34.0*          Xr Abdomen (1 View) (cpt=74018)    Resul

## 2020-01-30 NOTE — PROGRESS NOTES
[facility] Pharmacy Note:  Renal Dose Adjustment for Metoclopramide (REGLAN)    Melly Vizcaino has been prescribed Metoclopramide (REGLAN) 10 mg every 6 hours. Estimated Creatinine Clearance: 20.6 mL/min (A) (based on SCr of 2.54 mg/dL (H)).     His calculat

## 2020-01-30 NOTE — PROGRESS NOTES
[facility]     Gastroenterology Progress Note    Cholo Lee Patient Status:  Inpatient    1940 MRN [id number]   Location [facility] 2W/SW Attending Caitlin Hodge, [address] [facility] [address] Day # 20 PCP No primary care prov °F (37.2 °C) Bladder 59 13 100 % —   01/29/20 0200 113/58 99.1 °F (37.3 °C) Bladder 54 16 99 % —   01/29/20 0100 111/57 99 °F (37.2 °C) Bladder 54 16 100 % —   01/29/20 0000 134/73 99.1 °F (37.3 °C) Bladder 64 17 99 % —   01/28/20 2300 104/55 99 °F (37.2 ° 29.7 29.5 29.5   MCHC 29.8*  --  30.7* 30.6* 30.4*   RDW 17.2*  --  17.1* 17.1* 16.9*   NEPRELIM 6.35  --  6.95  --  5.77   WBC 8.9  --  9.3 9.6 7.9   .0  --  316.0 312.0 312.0    < > = values in this interval not displayed.          Recent Labs   La

## 2020-01-30 NOTE — PROGRESS NOTES
[facility]    Progress Note    Jack Marker Patient Status:  Inpatient    1940 MRN [id number]   Location [facility] 2W/SW Attending Karen Schwartz,    Hosp Day # 20 PCP No primary care provider on file.        Subjec Continuous Infusions:   • dexmedetomidine Stopped (01/30/20 0500)   • Heparin Sod (Porcine) in D5W Stopped (01/30/20 0906)   • dextrose 50 mL/hr at 01/30/20 0700   • sodium chloride 5 mL/hr at 01/27/20 0800   • DOBUTamine Stopped (01/20/20 0000)   • 1/16  Remains ICU status while on vent  Carotid stenosis noted on pre op U/S= >70% on right. Pt. Aware & asymptomatic. Plan for further evaluation during post op follow up appt.   Repeat Echo 1/17 no pericardial effusion  Continue to betadine swab  CV surgi NP  1/30/2020

## 2020-01-30 NOTE — PHYSICAL THERAPY NOTE
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 Research supports that patients with this level of impairment may benefit from rehab stay. PT recommendation Acute Rehab at hospital d/c.  Pt is significantly be patient currently need. ..   -   Moving to and from a bed to a chair (including a wheelchair)?: A Lot   -   Need to walk in hospital room?: A Lot   -   Climbing 3-5 steps with a railing?: Total     AM-PAC Score:  Raw Score: 11   Approx Degree of Impairment:

## 2020-01-30 NOTE — PROGRESS NOTES
Orthopaedic progress note    S/p Left hip revision (one stage exchange for infection) on 1/10/2020. Denies significant hip pain. Up in chair today with nursing. O: Alert and appropriate, NAD. Left hip incision clean/dry/intact. Sutures intact.   LLE

## 2020-01-30 NOTE — PROGRESS NOTES
Awake and alert answering questions appropriately this morning. He had an emesis this morning without a clear etiology. Exam:  Danae Job site is clean. Packing removed today without incident. Assessment/plan:  Doing well from a tracheostomy standpoint.

## 2020-01-30 NOTE — OCCUPATIONAL THERAPY NOTE
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

## 2020-01-30 NOTE — PLAN OF CARE
Alert and oriented. Communicating well with writing and mouthing words. On his phone this afternoon checking emails. Stood and transferred with help from rehab to chair - up for over three hours. Tolerated well.    Night shift reported emesis early this mor like us to know as we care for you?  \"per daughter, we have a caregiver for him from 9pm to 9am\"  - Provide timely, complete, and accurate information to patient/family  - Incorporate patient and family knowledge, values, beliefs, and cultural backgrounds assessment.  - Educate pt/family on patient safety including physical limitations  - Instruct pt to call for assistance with activity based on assessment  - Modify environment to reduce risk of injury  - Provide assistive devices as appropriate  - Consider signs of decreased cardiac output  - Evaluate effectiveness of vasoactive medications to optimize hemodynamic stability  - Monitor arterial and/or venous puncture sites for bleeding and/or hematoma  - Assess quality of pulses, skin color and temperature  - patient on self management of diabetes  Outcome: Progressing  Goal: Electrolytes maintained within normal limits  Description  INTERVENTIONS:  - Monitor labs and rhythm and assess patient for signs and symptoms of electrolyte imbalances  - Administer elect Maintain proper alignment of affected body part  Description  INTERVENTIONS:  - Support and protect limb and body alignment per provider's orders  - Instruct and reinforce with patient and family use of appropriate assistive device and precautions  Outcome

## 2020-01-31 ENCOUNTER — APPOINTMENT (OUTPATIENT)
Dept: GENERAL RADIOLOGY | Facility: HOSPITAL | Age: 80
DRG: 003 | End: 2020-01-31
Attending: CLINICAL NURSE SPECIALIST
Payer: MEDICARE

## 2020-01-31 ENCOUNTER — APPOINTMENT (OUTPATIENT)
Dept: INTERVENTIONAL RADIOLOGY/VASCULAR | Facility: HOSPITAL | Age: 80
DRG: 003 | End: 2020-01-31
Attending: CLINICAL NURSE SPECIALIST
Payer: MEDICARE

## 2020-01-31 LAB
ALBUMIN SERPL-MCNC: 1.8 G/DL (ref 3.4–5)
ANION GAP SERPL CALC-SCNC: 5 MMOL/L (ref 0–18)
APTT PPP: 58.6 SECONDS (ref 23.2–35.3)
BASOPHILS # BLD AUTO: 0.03 X10(3) UL (ref 0–0.2)
BASOPHILS NFR BLD AUTO: 0.5 %
BUN BLD-MCNC: 57 MG/DL (ref 7–18)
BUN/CREAT SERPL: 22.3 (ref 10–20)
CALCIUM BLD-MCNC: 8.1 MG/DL (ref 8.5–10.1)
CHLORIDE SERPL-SCNC: 107 MMOL/L (ref 98–112)
CO2 SERPL-SCNC: 33 MMOL/L (ref 21–32)
CREAT BLD-MCNC: 2.56 MG/DL (ref 0.7–1.3)
DEPRECATED RDW RBC AUTO: 57.1 FL (ref 35.1–46.3)
EOSINOPHIL # BLD AUTO: 0.56 X10(3) UL (ref 0–0.7)
EOSINOPHIL NFR BLD AUTO: 8.5 %
ERYTHROCYTE [DISTWIDTH] IN BLOOD BY AUTOMATED COUNT: 16.1 % (ref 11–15)
GLUCOSE BLD-MCNC: 116 MG/DL (ref 70–99)
GLUCOSE BLDC GLUCOMTR-MCNC: 111 MG/DL (ref 70–99)
GLUCOSE BLDC GLUCOMTR-MCNC: 112 MG/DL (ref 70–99)
GLUCOSE BLDC GLUCOMTR-MCNC: 151 MG/DL (ref 70–99)
GLUCOSE BLDC GLUCOMTR-MCNC: 96 MG/DL (ref 70–99)
HCT VFR BLD AUTO: 28.1 % (ref 39–53)
HGB BLD-MCNC: 8.5 G/DL (ref 13–17.5)
IMM GRANULOCYTES # BLD AUTO: 0.04 X10(3) UL (ref 0–1)
IMM GRANULOCYTES NFR BLD: 0.6 %
INR BLD: 1.55 (ref 0.9–1.2)
LYMPHOCYTES # BLD AUTO: 0.91 X10(3) UL (ref 1–4)
LYMPHOCYTES NFR BLD AUTO: 13.7 %
MCH RBC QN AUTO: 29.5 PG (ref 26–34)
MCHC RBC AUTO-ENTMCNC: 30.2 G/DL (ref 31–37)
MCV RBC AUTO: 97.6 FL (ref 80–100)
MONOCYTES # BLD AUTO: 0.54 X10(3) UL (ref 0.1–1)
MONOCYTES NFR BLD AUTO: 8.2 %
NEUTROPHILS # BLD AUTO: 4.54 X10 (3) UL (ref 1.5–7.7)
NEUTROPHILS # BLD AUTO: 4.54 X10(3) UL (ref 1.5–7.7)
NEUTROPHILS NFR BLD AUTO: 68.5 %
OSMOLALITY SERPL CALC.SUM OF ELEC: 317 MOSM/KG (ref 275–295)
PHOSPHATE SERPL-MCNC: 3.8 MG/DL (ref 2.5–4.9)
PLATELET # BLD AUTO: 244 10(3)UL (ref 150–450)
POTASSIUM SERPL-SCNC: 3.9 MMOL/L (ref 3.5–5.1)
PROTHROMBIN TIME: 18.6 SECONDS (ref 11.8–14.5)
RBC # BLD AUTO: 2.88 X10(6)UL (ref 3.8–5.8)
SODIUM SERPL-SCNC: 145 MMOL/L (ref 136–145)
WBC # BLD AUTO: 6.6 X10(3) UL (ref 4–11)

## 2020-01-31 PROCEDURE — 99233 SBSQ HOSP IP/OBS HIGH 50: CPT | Performed by: INTERNAL MEDICINE

## 2020-01-31 PROCEDURE — 99232 SBSQ HOSP IP/OBS MODERATE 35: CPT | Performed by: INTERNAL MEDICINE

## 2020-01-31 PROCEDURE — 0W9B30Z DRAINAGE OF LEFT PLEURAL CAVITY WITH DRAINAGE DEVICE, PERCUTANEOUS APPROACH: ICD-10-PCS | Performed by: RADIOLOGY

## 2020-01-31 PROCEDURE — 71045 X-RAY EXAM CHEST 1 VIEW: CPT | Performed by: CLINICAL NURSE SPECIALIST

## 2020-01-31 RX ORDER — BISACODYL 10 MG
10 SUPPOSITORY, RECTAL RECTAL DAILY
Status: DISCONTINUED | OUTPATIENT
Start: 2020-01-31 | End: 2020-02-04

## 2020-01-31 RX ORDER — MIRTAZAPINE 15 MG/1
15 TABLET, FILM COATED ORAL NIGHTLY
Status: DISCONTINUED | OUTPATIENT
Start: 2020-01-31 | End: 2020-02-04

## 2020-01-31 RX ORDER — WARFARIN SODIUM 5 MG/1
5 TABLET ORAL ONCE
Status: COMPLETED | OUTPATIENT
Start: 2020-01-31 | End: 2020-01-31

## 2020-01-31 RX ORDER — LIDOCAINE HYDROCHLORIDE 20 MG/ML
INJECTION, SOLUTION EPIDURAL; INFILTRATION; INTRACAUDAL; PERINEURAL
Status: COMPLETED
Start: 2020-01-31 | End: 2020-01-31

## 2020-01-31 RX ORDER — POLYETHYLENE GLYCOL 3350 17 G/17G
17 POWDER, FOR SOLUTION ORAL DAILY
Status: DISCONTINUED | OUTPATIENT
Start: 2020-01-31 | End: 2020-02-04

## 2020-01-31 RX ORDER — MIDAZOLAM HYDROCHLORIDE 1 MG/ML
INJECTION INTRAMUSCULAR; INTRAVENOUS
Status: COMPLETED
Start: 2020-01-31 | End: 2020-01-31

## 2020-01-31 NOTE — OCCUPATIONAL THERAPY NOTE
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

## 2020-01-31 NOTE — CARDIAC REHAB
Cardiac Rehab Phase I    Activity:   Chair: Yes   Ambulation: No   Assistive Device: Gait belt   Distance:  feet   Assistance needed: Maximum   Patient tolerated activity: Well. Shower Date:  Tolerated Shower Activity .     Education:  Handouts provided a

## 2020-01-31 NOTE — BRIEF PROCEDURE NOTE
[facility]  Procedure Note    Deandre Paredes Patient Status:  Inpatient    1940 MRN [id number]   Location [facility] Attending [address] [facility], [address] Day # 21 PCP No primary care provider on

## 2020-01-31 NOTE — RESPIRATORY THERAPY NOTE
RESPIRATORY THERAPY PATIENT TRANSPORT NOTE    Transported from: cathlab  Transported to: ccu 230      Patient is intubated?:yes  Transport ventilator used?:no  Resuscitation bag used during transport?:yes   ETT placement and ventilator function were verifi

## 2020-01-31 NOTE — DIETARY NOTE
ADULT NUTRITION REASSESSMENT     Pt is at high nutrition risk. Pt does not meet malnutrition criteria. RECOMMENDATIONS TO MD:  See Nutrition Intervention for EN rx. EN order set in place. Md to advance tube feeds back to goal once +BM.       Ela Husain continues. 1/27 Update:  Tube feeds tolerated well/improved and providing goal nutrition. Plan: trach as unable to extubated. 1/31 Update: 1/28 PEG-J placed (no tube feeds on 1/28). 1/30 FWF increased to 120 ml q4h for adequate hydration.   EN held 1/30 f lb 12.3 oz)   01/28/20 0600 100 kg (220 lb 7.4 oz)   01/26/20 0600 99 kg (218 lb 4.1 oz)   01/25/20 0600 101.5 kg (223 lb 12.3 oz)   01/24/20 0600 102 kg (224 lb 13.9 oz)   01/23/20 0500 102.1 kg (225 lb)   01/22/20 0600 109 kg (240 lb 6.4 oz)   01/21/20 0 • vancomycin HCl  125 mg Oral BID   • DULoxetine HCl  30 mg Oral Daily   • Ocuvite-Lutein  1 tablet Oral Daily   • Donepezil HCl  10 mg Oral Nightly   • Levothyroxine Sodium  50 mcg Oral Before breakfast   • atorvastatin  10 mg Oral Nightly   • tamsulosi adequacy of enteral nutrition and for enteral nutrition adjustment.    - Anthropometric Measurement:      Monitor: wt and wt change  - Nutrition Goals:      allow wt loss due to adipose tissue loss while maintain lean body mass, tube feed meets greater than

## 2020-01-31 NOTE — PLAN OF CARE
Alert and oriented. Communicating by mouthing words and writing on clipboard. Up to chair with PT/OT, stand/pivot 2 assist with gait belt. Given suppository and had soft BM x1. Chest tube placement in IR d/t persistent left pleural effusion.    Trach to knowledge, values, beliefs, and cultural backgrounds into the planning and delivery of care  - Encourage patient/family to participate in care and decision-making at the level they choose  - Honor patient and family perspectives and choices   Outcome: Prog Provide assistive devices as appropriate  - Consider OT/PT consult to assist with strengthening/mobility  - Encourage toileting schedule  Outcome: Progressing     Problem: GASTROINTESTINAL - ADULT  Goal: Minimal or absence of nausea and vomiting  Descripti threatening arrhythmias  - Monitor electrolytes and administer replacement therapy as ordered  Outcome: Progressing     Problem: RESPIRATORY - ADULT  Goal: Achieves optimal ventilation and oxygenation  Description  INTERVENTIONS:  - Assess for changes in r medications as ordered  - Assess and document risk factors for pressure ulcer development  - Assess and document dressing/incision, wound bed, drain sites and surrounding tissue  - Implement wound care per orders  - Initiate self-injury protocol  - Impleme injury  Description  (Example usage: patient with low platelets)  INTERVENTIONS:  - Avoid intramuscular injections, enemas and rectal medication administration  - Ensure safe mobilization of patient  - Hold pressure on venipuncture sites to achieve adequat

## 2020-01-31 NOTE — PROGRESS NOTES
[facility]     Gastroenterology Progress Note    Guy Muro Patient Status:  Inpatient    1940 MRN [id number]   Location [facility] 2W/SW Attending Tapan Dailey, [address] Day # 21 PCP No primary care prov Miralax and he feels like he may need to have one soon.      Objective:     Patient Vitals for the past 24 hrs:   BP Temp Temp src Pulse Resp SpO2 Weight   01/29/20 0900 137/77 — — 91 20 99 % —   01/29/20 0800 144/66 98.6 °F (37 °C) Bladder 88 17 98 % —   0 ok, Bumper remains at  3 cm, binder in place. Aspiration of gastric contents with syringe /no blood noted. Hooked to wall suction and 450 cc TF suctioned out  Skin- No jaundice. Warm and dry. Ext: No cyanosis, clubbing or edema is evident.      Results: paraesophageal lymphadenopathy are unchanged and may be reactive. Followup chest CT recommended in 3 months to demonstrate resolution. Small pericardial effusion. 6 x 4 cm area of ill-defined hemorrhage within the right inguinal region.   Correlate for r

## 2020-01-31 NOTE — PROGRESS NOTES
[facility] ID PROGRESS NOTE    Ana Comment Patient Status:  Inpatient    1940 MRN [id number]   Location [facility] 2W/SW Attending Concetta Boatengissa Day # 21 PCP No primary care provider on file.      Subjective:  Trach in place and then had to be revised due to dislocation in 2016. Per pt, since that time has had pain on and off, but more recently.  In Jan 2019 had a hip aspiration and this showed staph epi and pt was seen by another ID MD. At that time, no surgery done and pt neisha

## 2020-01-31 NOTE — PROGRESS NOTES
[place] [facility]    Progress Note    Addis Nicholas Patient Status:  Inpatient    1940 MRN [id number]   Location [facility] 2W/SW Attending Saida Campbell,    Hosp Day # 21 PCP No primary care provider on file.        Subjec 2.56*   GFRAA 22* 26* 26*   GFRNAA 19* 23* 23*   CA 8.4* 8.6 8.1*   * 147* 145   K 4.2 4.1 3.9    109 107   CO2 36.0* 34.0* 33.0*          Xr Abdomen (1 View) (cpt=74018)    Result Date: 1/30/2020  CONCLUSION:  1. Diffuse ileus.     Dictated by (CST):  Gillian Barrera MD on 1/31/2020 at 8:03                Lata Muhammad MD  1/31/2020

## 2020-01-31 NOTE — RESPIRATORY THERAPY NOTE
RESPIRATORY THERAPY PATIENT TRANSPORT NOTE    Transported from: [address] [facility]  Transported to: CT      Patient is intubated?:yes  Transport ventilator used? :yes  ETT placement and ventilator function were verified post-transport.     RCP comment : Patient tolerat

## 2020-01-31 NOTE — PHYSICAL THERAPY NOTE
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 \" -->mouthed words    OBJECTIVE  Precautions: DEVIN - posterior;Limb alert - left;H pivot tx at MOD A x 2, 3rd person for line management     Patient End of Session: Up in chair;Needs met;Call light within reach;RN aware of session/findings; All patient questions and concerns addressed; Other (Comment)(RN present)    CURRENT GOALS   Goals t

## 2020-01-31 NOTE — PROGRESS NOTES
[place] [facility] - [facility]    Progress Note    Shemar Atkins Patient Status:  Inpatient    1940 MRN [id number]   Location [facility] 2W/SW Attending Nuvia Gordon DO   Hosp Day # 21 PCP No primary care provider on file.      Subjec Neurological:  Awake, alert, calm and cooperative    Assessment/Plan:  S/P CABG x 3 POD # 16  Resp. Failure/cardiac arrest (bradycardia-PEA-CPR) on 1/16/20- s/p trach placement on 1/28.  Currently on CPAP w/vent mgt per Pulm   Increase activity; working w as well.      Angeles Estrada  1/31/2020  8:08 AM

## 2020-01-31 NOTE — PROGRESS NOTES
[facility]    Progress Note    Susannah Bhakta Patient Status:  Inpatient    1940 MRN [id number]   Location [facility] 2W/SW Attending Cristy Cooper DO   Hosp Day # 21 PCP No primary care provider on file.          Jalen 01/18/2020    ALT 21 01/18/2020    PTT 58.6 (H) 01/31/2020    INR 1.55 (H) 01/31/2020    TSH 3.250 01/14/2020    MG 3.0 (H) 01/26/2020    PHOS 3.8 01/31/2020    TROP 4.030 (HH) 01/17/2020     01/27/2020

## 2020-01-31 NOTE — PROGRESS NOTES
[facility]    Progress Note    Antonio Martins Patient Status:  Inpatient    1940 MRN [id number]   Location [facility] 2W/SW Attending Ramandeep Patel,    Hosp Day # 21 PCP No primary care provider on file.         Sub Kidney injury / ATN    good UO     start to improve   Renal following      8-s/p revision of total replacement of left hip joint on 1/10/2020  Prior multiple hip surgery and back surgery recently   Prolonged deconditioning and limited activity      H/o   I appearance. Ground-glass opacities within the right upper lobe may be secondary to edema or infectious etiologies have also decreased but not resolved. Mild right lower paratracheal and paraesophageal lymphadenopathy are unchanged and may be reactive.  Fo

## 2020-01-31 NOTE — PLAN OF CARE
Problem: RESPIRATORY - ADULT  Goal: Achieves optimal ventilation and oxygenation  Description  INTERVENTIONS:  - Assess for changes in respiratory status  - Assess for changes in mentation and behavior  - Position to facilitate oxygenation and minimize r of hyperglycemia and hypoglycemia  - Administer ordered medications to maintain glucose within target range  - Assess barriers to adequate nutritional intake and initiate nutrition consult as needed  - Instruct patient on self management of diabetes  Outco pain  - Anticipate increased pain with activity and pre-medicate as appropriate  Outcome: Progressing     Problem: SAFETY ADULT - FALL  Goal: Free from fall injury  Description  INTERVENTIONS:  - Assess pt frequently for physical needs  - Identify cognitiv degree AVblock. Galarza was leaking. New 16 Fr inserted using sterile technique. Tolerated well. Jevity 1.5 restarted last night and kept @ 15ml/hr via PEG tube. Tolerated well with min residual. Reglan 5mg IVP given, as scheduled. Accuchecks remain wnl.  Com

## 2020-02-01 ENCOUNTER — APPOINTMENT (OUTPATIENT)
Dept: GENERAL RADIOLOGY | Facility: HOSPITAL | Age: 80
DRG: 003 | End: 2020-02-01
Attending: INTERNAL MEDICINE
Payer: MEDICARE

## 2020-02-01 LAB
ANION GAP SERPL CALC-SCNC: 5 MMOL/L (ref 0–18)
APTT PPP: 73.5 SECONDS (ref 23.2–35.3)
BASOPHILS # BLD AUTO: 0.03 X10(3) UL (ref 0–0.2)
BASOPHILS NFR BLD AUTO: 0.5 %
BUN BLD-MCNC: 52 MG/DL (ref 7–18)
BUN/CREAT SERPL: 20 (ref 10–20)
CALCIUM BLD-MCNC: 8.3 MG/DL (ref 8.5–10.1)
CHLORIDE SERPL-SCNC: 104 MMOL/L (ref 98–112)
CO2 SERPL-SCNC: 33 MMOL/L (ref 21–32)
CREAT BLD-MCNC: 2.6 MG/DL (ref 0.7–1.3)
DEPRECATED RDW RBC AUTO: 56.2 FL (ref 35.1–46.3)
EOSINOPHIL # BLD AUTO: 0.52 X10(3) UL (ref 0–0.7)
EOSINOPHIL NFR BLD AUTO: 8.3 %
ERYTHROCYTE [DISTWIDTH] IN BLOOD BY AUTOMATED COUNT: 15.9 % (ref 11–15)
GLUCOSE BLD-MCNC: 98 MG/DL (ref 70–99)
GLUCOSE BLDC GLUCOMTR-MCNC: 111 MG/DL (ref 70–99)
GLUCOSE BLDC GLUCOMTR-MCNC: 125 MG/DL (ref 70–99)
GLUCOSE BLDC GLUCOMTR-MCNC: 127 MG/DL (ref 70–99)
GLUCOSE BLDC GLUCOMTR-MCNC: 129 MG/DL (ref 70–99)
GLUCOSE BLDC GLUCOMTR-MCNC: 98 MG/DL (ref 70–99)
HCT VFR BLD AUTO: 30.3 % (ref 39–53)
HGB BLD-MCNC: 9.2 G/DL (ref 13–17.5)
IMM GRANULOCYTES # BLD AUTO: 0.03 X10(3) UL (ref 0–1)
IMM GRANULOCYTES NFR BLD: 0.5 %
INR BLD: 2.92 (ref 0.9–1.2)
LYMPHOCYTES # BLD AUTO: 1 X10(3) UL (ref 1–4)
LYMPHOCYTES NFR BLD AUTO: 16.1 %
MCH RBC QN AUTO: 29.4 PG (ref 26–34)
MCHC RBC AUTO-ENTMCNC: 30.4 G/DL (ref 31–37)
MCV RBC AUTO: 96.8 FL (ref 80–100)
MONOCYTES # BLD AUTO: 0.54 X10(3) UL (ref 0.1–1)
MONOCYTES NFR BLD AUTO: 8.7 %
NEUTROPHILS # BLD AUTO: 4.11 X10 (3) UL (ref 1.5–7.7)
NEUTROPHILS # BLD AUTO: 4.11 X10(3) UL (ref 1.5–7.7)
NEUTROPHILS NFR BLD AUTO: 65.9 %
OSMOLALITY SERPL CALC.SUM OF ELEC: 308 MOSM/KG (ref 275–295)
PLATELET # BLD AUTO: 244 10(3)UL (ref 150–450)
POTASSIUM SERPL-SCNC: 3.6 MMOL/L (ref 3.5–5.1)
PROTHROMBIN TIME: 31.1 SECONDS (ref 11.8–14.5)
RBC # BLD AUTO: 3.13 X10(6)UL (ref 3.8–5.8)
SODIUM SERPL-SCNC: 142 MMOL/L (ref 136–145)
WBC # BLD AUTO: 6.2 X10(3) UL (ref 4–11)

## 2020-02-01 PROCEDURE — 99232 SBSQ HOSP IP/OBS MODERATE 35: CPT | Performed by: INTERNAL MEDICINE

## 2020-02-01 PROCEDURE — 71045 X-RAY EXAM CHEST 1 VIEW: CPT | Performed by: INTERNAL MEDICINE

## 2020-02-01 PROCEDURE — 99233 SBSQ HOSP IP/OBS HIGH 50: CPT | Performed by: INTERNAL MEDICINE

## 2020-02-01 RX ORDER — HEPARIN SODIUM 10000 [USP'U]/100ML
INJECTION, SOLUTION INTRAVENOUS
Status: DISPENSED
Start: 2020-02-01 | End: 2020-02-02

## 2020-02-01 RX ORDER — WARFARIN SODIUM 3 MG/1
3 TABLET ORAL
Status: COMPLETED | OUTPATIENT
Start: 2020-02-01 | End: 2020-02-01

## 2020-02-01 RX ORDER — AMIODARONE HYDROCHLORIDE 200 MG/1
200 TABLET ORAL 2 TIMES DAILY WITH MEALS
Status: DISCONTINUED | OUTPATIENT
Start: 2020-02-01 | End: 2020-02-02

## 2020-02-01 RX ORDER — POTASSIUM CHLORIDE 29.8 MG/ML
40 INJECTION INTRAVENOUS ONCE
Status: COMPLETED | OUTPATIENT
Start: 2020-02-01 | End: 2020-02-01

## 2020-02-01 NOTE — PROGRESS NOTES
[facility] - [facility]    Progress Note      Assessment and Plan:   1. 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     Recommendations: Aggressive antipain, anti-thrombosis and ant 02/01/2020    HGB 9.2 02/01/2020    HCT 30.3 02/01/2020    .0 02/01/2020    CREATSERUM 2.60 02/01/2020    BUN 52 02/01/2020     02/01/2020    K 3.6 02/01/2020     02/01/2020    CO2 33.0 02/01/2020    GLU 98 02/01/2020    CA 8.3 02/01/202

## 2020-02-01 NOTE — PROGRESS NOTES
Physical Exam:  Vitals reviewed, he is asleep, looks very comfortable.  private caregiver at bedside  General: NAD  Neck: No JVD  Lungs: Clear bilaterally anteriorly and laterally   Heart: RRR, S1, S2  Abdomen: Soft & round, NT/ND, BS+x4, +flatus, no BM sin surgeon    Santana Silva RN

## 2020-02-01 NOTE — PROGRESS NOTES
Received pt back from IR around 1700. Left side chest tube placed to -20 cm H2O suction. 320ml serosanguineous fluid present upon arrival. Pt drowsy but arouseable and following commands. States breathing feels better.  Family updated and brought into the r

## 2020-02-01 NOTE — OCCUPATIONAL THERAPY NOTE
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

## 2020-02-01 NOTE — PHYSICAL THERAPY NOTE
PHYSICAL THERAPY TREATMENT NOTE - INPATIENT     Room Number: 024/088-V       Presenting Problem: L DEVIN revision - Posterior on 1/10; CABG on 1/14; cardiac arrest; prolonged intubation; Trach/PEG on 1/28;    Problem List  Active Problems:    History of revi supports that patients with this level of impairment may benefit from LTAC. Recommending ACUTE REHAB at hospital d/c. Pt is significantly below his functional mobility baseline. Pror to admission, pt was independent w/ ADLs, amb w/ RW, and was driving. currently need. ..   -   Moving to and from a bed to a chair (including a wheelchair)?: Total   -   Need to walk in hospital room?: Total   -   Climbing 3-5 steps with a railing?: Total     AM-PAC Score:  Raw Score: 8   Approx Degree of Impairment: 86.62%

## 2020-02-01 NOTE — RESPIRATORY THERAPY NOTE
Patient received on trach with ventilator support. A/C 16/500/+5/40%. Patient placed on CPAP/PS 10/5 at 07:52 this morning and has been tolerating CPAP well throughout. Patient was able to be moved out of bed to chair on ventilator.  Receiving DuoNeb QID an

## 2020-02-01 NOTE — PROGRESS NOTES
[facility]     Gastroenterology Progress Note    Barak Mello Patient Status:  Inpatient    1940 MRN [id number]   Location [facility] 2W/SW Attending Edgar Page, [address] Day # 22 PCP No primary care prov chest tube. There is a small left pleural effusion which has decreased in size since prior exam and there is improved aeration of the left lung since previous exam. 2.  Post CABG. 3.  Post bilateral shoulder arthroplasties.   Dictated by (CST): Ulysses Malay

## 2020-02-01 NOTE — PROGRESS NOTES
[place] [facility] - [facility]    Progress Note    Deandre Paredes Patient Status:  Inpatient    1940 MRN [id number]   Location [facility] 2W/SW Attending Anamika Mendoza, [address] [facility] Day # 22 PCP No primary care provider on file.        Subjec CREATSERUM 2.54* 2.56* 2.60*   GFRAA 26* 26* 26*   GFRNAA 23* 23* 22*   CA 8.6 8.1* 8.3*   * 145 142   K 4.1 3.9 3.6    107 104   CO2 34.0* 33.0* 33.0*          Ct Chest+abdomen+pelvis(cpt=71250/92534)    Result Date: 1/30/2020  CONCLUSION: clavicles. No pneumothorax. Persistent moderate left base consolidation and effusion. Can't exclude left basal pneumonia/atelectasis. Right lung field is clear.     Dictated by (CST): Jessica Stringer MD on 1/31/2020 at 8:00     Approved by (CST): Elvira Villeda,

## 2020-02-01 NOTE — RESPIRATORY THERAPY NOTE
Received patient on full vent support AC16/500/40%/+5. Claudette Cobbs is patent and secure. Tolerating settings well. No changes/events overnight. RT will continue to monitor.

## 2020-02-01 NOTE — PROGRESS NOTES
[facility]    Progress Note    Sallie Cook Patient Status:  Inpatient    1940 MRN [id number]   Location [facility] 2W/SW Attending Jazmin Yen, [address] Day # 22 PCP No primary care provider on file.          Jalen Tube Daily   • aspirin  81 mg Per G Tube Daily   • carvedilol  3.125 mg Per G Tube BID with meals   • ipratropium-albuterol  3 mL Nebulization QID   • acetylcysteine  2 mL Nebulization BID   • nystatin   Topical BID   • DAPTOmycin  6 mg/kg (Adjusted) Intra have decreased but not resolved since 1/21/2020. Underlying pneumonia can have a similar appearance. Ground-glass opacities within the right upper lobe may be secondary to edema or infectious etiologies have also decreased but not resolved.   Mild right lo

## 2020-02-01 NOTE — PLAN OF CARE
Problem: Patient/Family Goals  Goal: Patient/Family Long Term Goal  Description  Patient's Long Term Goal: \"To be able to get back home and walking\"    Interventions:  - Patient will need to discharge to rehab when cleared for further strength and mobi comfort level or patient's stated pain goal  Description  INTERVENTIONS:  - Encourage pt to monitor pain and request assistance  - Assess pain using appropriate pain scale  - Administer analgesics based on type and severity of pain and evaluate response  - factors for pressure ulcer development  - Assess and document skin integrity  - Monitor for areas of redness and/or skin breakdown  - Initiate interventions, skin care algorithm/standards of care as needed  Outcome: Progressing     Problem: HEMATOLOGIC - A Continuous cardiac monitoring, monitor vital signs, obtain 12 lead EKG if indicated  - Evaluate effectiveness of antiarrhythmic and heart rate control medications as ordered  - Initiate emergency measures for life threatening arrhythmias  - Monitor electro appropriate  Outcome: Progressing     Problem: Skin/tissue integrity  Goal: Incisions, wounds, or drain sites healing without s/s of infection  Description  INTERVENTIONS:  - Assess and document skin integrity  - Administer medications as ordered  - Assess

## 2020-02-01 NOTE — PLAN OF CARE
Problem: Patient/Family Goals  Goal: Patient/Family Long Term Goal  Description  Patient's Long Term Goal: \"To be able to get back home and walking\"    Interventions:  - Patient will need to discharge to rehab when cleared for further strength and mobi comfort level or patient's stated pain goal  Description  INTERVENTIONS:  - Encourage pt to monitor pain and request assistance  - Assess pain using appropriate pain scale  - Administer analgesics based on type and severity of pain and evaluate response  - factors for pressure ulcer development  - Assess and document skin integrity  - Monitor for areas of redness and/or skin breakdown  - Initiate interventions, skin care algorithm/standards of care as needed  Outcome: Progressing     Problem: HEMATOLOGIC - A Continuous cardiac monitoring, monitor vital signs, obtain 12 lead EKG if indicated  - Evaluate effectiveness of antiarrhythmic and heart rate control medications as ordered  - Initiate emergency measures for life threatening arrhythmias  - Monitor electro appropriate  Outcome: Progressing     Problem: Skin/tissue integrity  Goal: Incisions, wounds, or drain sites healing without s/s of infection  Description  INTERVENTIONS:  - Assess and document skin integrity  - Administer medications as ordered  - Assess bowel sounds, tylenol 650mg per peg for general discomfort, bc dilaudid dc'd d/t antonio, pt and family notified, precedex low dose to help rest for night, pt's private sitter Elizabeth at bedside helps calm pt. Wife home for night.

## 2020-02-02 ENCOUNTER — APPOINTMENT (OUTPATIENT)
Dept: GENERAL RADIOLOGY | Facility: HOSPITAL | Age: 80
DRG: 003 | End: 2020-02-02
Attending: THORACIC SURGERY (CARDIOTHORACIC VASCULAR SURGERY)
Payer: MEDICARE

## 2020-02-02 LAB
ALBUMIN SERPL-MCNC: 2 G/DL (ref 3.4–5)
ANION GAP SERPL CALC-SCNC: 4 MMOL/L (ref 0–18)
BASOPHILS # BLD AUTO: 0.01 X10(3) UL (ref 0–0.2)
BASOPHILS NFR BLD AUTO: 0.2 %
BUN BLD-MCNC: 47 MG/DL (ref 7–18)
BUN/CREAT SERPL: 18.7 (ref 10–20)
CALCIUM BLD-MCNC: 8.1 MG/DL (ref 8.5–10.1)
CHLORIDE SERPL-SCNC: 105 MMOL/L (ref 98–112)
CO2 SERPL-SCNC: 33 MMOL/L (ref 21–32)
CREAT BLD-MCNC: 2.52 MG/DL (ref 0.7–1.3)
DEPRECATED RDW RBC AUTO: 56.6 FL (ref 35.1–46.3)
EOSINOPHIL # BLD AUTO: 0.54 X10(3) UL (ref 0–0.7)
EOSINOPHIL NFR BLD AUTO: 8.1 %
ERYTHROCYTE [DISTWIDTH] IN BLOOD BY AUTOMATED COUNT: 15.9 % (ref 11–15)
GLUCOSE BLD-MCNC: 122 MG/DL (ref 70–99)
GLUCOSE BLDC GLUCOMTR-MCNC: 143 MG/DL (ref 70–99)
HCT VFR BLD AUTO: 30.6 % (ref 39–53)
HGB BLD-MCNC: 9.4 G/DL (ref 13–17.5)
IMM GRANULOCYTES # BLD AUTO: 0.03 X10(3) UL (ref 0–1)
IMM GRANULOCYTES NFR BLD: 0.5 %
INR BLD: 3.09 (ref 0.9–1.2)
LYMPHOCYTES # BLD AUTO: 1 X10(3) UL (ref 1–4)
LYMPHOCYTES NFR BLD AUTO: 15 %
MCH RBC QN AUTO: 29.7 PG (ref 26–34)
MCHC RBC AUTO-ENTMCNC: 30.7 G/DL (ref 31–37)
MCV RBC AUTO: 96.5 FL (ref 80–100)
MONOCYTES # BLD AUTO: 0.59 X10(3) UL (ref 0.1–1)
MONOCYTES NFR BLD AUTO: 8.9 %
NEUTROPHILS # BLD AUTO: 4.49 X10 (3) UL (ref 1.5–7.7)
NEUTROPHILS # BLD AUTO: 4.49 X10(3) UL (ref 1.5–7.7)
NEUTROPHILS NFR BLD AUTO: 67.3 %
OSMOLALITY SERPL CALC.SUM OF ELEC: 308 MOSM/KG (ref 275–295)
PHOSPHATE SERPL-MCNC: 3.7 MG/DL (ref 2.5–4.9)
PLATELET # BLD AUTO: 220 10(3)UL (ref 150–450)
POTASSIUM SERPL-SCNC: 4.1 MMOL/L (ref 3.5–5.1)
PROTHROMBIN TIME: 32.5 SECONDS (ref 11.8–14.5)
RBC # BLD AUTO: 3.17 X10(6)UL (ref 3.8–5.8)
SODIUM SERPL-SCNC: 142 MMOL/L (ref 136–145)
WBC # BLD AUTO: 6.7 X10(3) UL (ref 4–11)

## 2020-02-02 PROCEDURE — 71045 X-RAY EXAM CHEST 1 VIEW: CPT | Performed by: THORACIC SURGERY (CARDIOTHORACIC VASCULAR SURGERY)

## 2020-02-02 PROCEDURE — 99233 SBSQ HOSP IP/OBS HIGH 50: CPT | Performed by: INTERNAL MEDICINE

## 2020-02-02 RX ORDER — WARFARIN SODIUM 2 MG/1
4 TABLET ORAL
Status: DISCONTINUED | OUTPATIENT
Start: 2020-02-02 | End: 2020-02-02

## 2020-02-02 RX ORDER — AMIODARONE HYDROCHLORIDE 200 MG/1
200 TABLET ORAL DAILY
Status: DISCONTINUED | OUTPATIENT
Start: 2020-02-03 | End: 2020-02-04

## 2020-02-02 NOTE — PHYSICAL THERAPY NOTE
PHYSICAL THERAPY TREATMENT NOTE - INPATIENT     Room Number: 639/988-S       Presenting Problem: L DEVIN revision - Posterior on 1/10; CABG on 1/14; cardiac arrest; prolonged intubation; Trach/PEG on 1/28;    Problem List  Active Problems:    History of revi in an intensive skilled therapy as pt is very motivated and always willing to work with therapy to get his strength and mobility back. Pt was independent in all aspects of functional mobility prior to admission.     DISCHARGE RECOMMENDATIONS  PT Discharge STATUS  Gait Assessment   Gait Assistance: Not tested  Distance (ft): (/)  Assistive Device: (/)  Pattern: (/)  Stoop/Curb Assistance: Not tested  Comment : (/)        THERAPEUTIC EXERCISES  Lower Extremity and Upper extremity Ankle pumps  Glut sets  Heel

## 2020-02-02 NOTE — PROGRESS NOTES
Physical Exam:  Vitals reviewed, he is asleep, looks very comfortable.  private caregiver at bedside  General: NAD  Neck: No JVD  Lungs: Clear bilaterally anteriorly and laterally   Heart: RRR, S1, S2  Abdomen: Soft & round, NT/ND, BS+x4, +flatus, no BM sin RN that family prefers [employer].  SW/CM involved with planning.   Rounded with cv surgeon    Britney Altman RN

## 2020-02-02 NOTE — PROGRESS NOTES
[facility] - [facility]    Progress Note      Assessment and Plan:   1. 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     Recommendations: Aggressive antipain, anti-thrombosis and ant appreciable. Extremities without clubbing cyanosis nor edema. Neurologic grossly intact with symmetric tone and strength and reflex.   Skin without gross abnormality     Results:     Lab Results   Component Value Date    WBC 6.7 02/02/2020    HGB 9.4 02

## 2020-02-02 NOTE — PROGRESS NOTES
[place] [facility] - [facility]    Progress Note    Ethyl Frame Patient Status:  Inpatient    1940 MRN [id number]   Location [facility] 2W/SW Attending Mart Leonard,    Hosp Day # 23 PCP No primary care provider on file.        Subjec 26* 26* 27*   GFRNAA 23* 22* 23*   CA 8.1* 8.3* 8.1*    142 142   K 3.9 3.6 4.1    104 105   CO2 33.0* 33.0* 33.0*          Xr Chest Ap Portable  (cpt=71045)    Result Date: 2/2/2020  CONCLUSION: Small to moderate slightly increased left pleura

## 2020-02-02 NOTE — PLAN OF CARE
Vitals remain WNL. Full support on the Vent overnight. Turning Q2h & PRN. C/o upper back/Right shoulder pain which Norco has been working well for. Melatonin to assist with sleep & Xanax for anxiety R/T coughing & comfort.  Utilizing the call light for his of falls.   - [place] fall precautions as indicated by assessment.  - Educate pt/family on patient safety including physical limitations  - Instruct pt to call for assistance with activity based on assessment  - Modify environment to reduce risk of injury achieve adequate hemostasis  - Assess for signs and symptoms of internal bleeding  - Monitor lab trends  - Patient is to report abnormal signs of bleeding to staff  - Avoid use of toothpicks and dental floss  - Use electric shaver for shaving  - Use soft b SOB or any respiratory difficulty  - Respiratory Therapy support as indicated  - Manage/alleviate anxiety  - Monitor for signs/symptoms of CO2 retention  Outcome: Progressing     Problem: METABOLIC  Goal: Glucose maintained within prescribed range  Descrip protocol as needed  Outcome: Progressing     Problem: Musculoskeletal  Goal: Maintain proper alignment of affected body part  Description  INTERVENTIONS:  - Support and protect limb and body alignment per provider's orders  - Instruct and reinforce with pa

## 2020-02-02 NOTE — PROGRESS NOTES
[facility]    Progress Note    Susannah Bhakta Patient Status:  Inpatient    1940 MRN [id number]   Location [facility] 2W/SW Attending Cristy Cooper,    Hosp Day # 23 PCP No primary care provider on file.          Jalen Q6H   • omeprazole  40 mg Per G Tube Daily   • aspirin  81 mg Per G Tube Daily   • carvedilol  3.125 mg Per G Tube BID with meals   • ipratropium-albuterol  3 mL Nebulization QID   • acetylcysteine  2 mL Nebulization BID   • nystatin   Topical BID   • DAPT 8:03          Ir Chest Tube    Result Date: 1/31/2020  CONCLUSION:  1. Ultrasound fluoroscopic guided placement of small bore chest tube for evacuation of a hemothorax.     Dictated by (CST): Wang Mendoza MD on 1/31/2020 at 16:50     Approved by (CST): VI

## 2020-02-03 ENCOUNTER — APPOINTMENT (OUTPATIENT)
Dept: CT IMAGING | Facility: HOSPITAL | Age: 80
DRG: 003 | End: 2020-02-03
Attending: CLINICAL NURSE SPECIALIST
Payer: MEDICARE

## 2020-02-03 ENCOUNTER — APPOINTMENT (OUTPATIENT)
Dept: GENERAL RADIOLOGY | Facility: HOSPITAL | Age: 80
DRG: 003 | End: 2020-02-03
Attending: THORACIC SURGERY (CARDIOTHORACIC VASCULAR SURGERY)
Payer: MEDICARE

## 2020-02-03 LAB
ALBUMIN SERPL-MCNC: 2 G/DL (ref 3.4–5)
ANION GAP SERPL CALC-SCNC: 4 MMOL/L (ref 0–18)
BUN BLD-MCNC: 45 MG/DL (ref 7–18)
BUN/CREAT SERPL: 19.5 (ref 10–20)
CALCIUM BLD-MCNC: 8.1 MG/DL (ref 8.5–10.1)
CHLORIDE SERPL-SCNC: 105 MMOL/L (ref 98–112)
CO2 SERPL-SCNC: 32 MMOL/L (ref 21–32)
CREAT BLD-MCNC: 2.31 MG/DL (ref 0.7–1.3)
GLUCOSE BLD-MCNC: 112 MG/DL (ref 70–99)
INR BLD: 2.55 (ref 0.9–1.2)
OSMOLALITY SERPL CALC.SUM OF ELEC: 304 MOSM/KG (ref 275–295)
PHOSPHATE SERPL-MCNC: 3.9 MG/DL (ref 2.5–4.9)
POTASSIUM SERPL-SCNC: 4.2 MMOL/L (ref 3.5–5.1)
PROTHROMBIN TIME: 27.8 SECONDS (ref 11.8–14.5)
SODIUM SERPL-SCNC: 141 MMOL/L (ref 136–145)

## 2020-02-03 PROCEDURE — 99233 SBSQ HOSP IP/OBS HIGH 50: CPT | Performed by: INTERNAL MEDICINE

## 2020-02-03 PROCEDURE — 71045 X-RAY EXAM CHEST 1 VIEW: CPT | Performed by: THORACIC SURGERY (CARDIOTHORACIC VASCULAR SURGERY)

## 2020-02-03 PROCEDURE — 71250 CT THORAX DX C-: CPT | Performed by: CLINICAL NURSE SPECIALIST

## 2020-02-03 NOTE — PHYSICAL THERAPY NOTE
PHYSICAL THERAPY TREATMENT NOTE - INPATIENT     Room Number: 638/038-P       Presenting Problem: L DEVIN revision - Posterior on 1/10; CABG on 1/14; cardiac arrest; prolonged intubation; Trach/PEG on 1/28;    Problem List  Active Problems:    History of revi fatigues quickly. He might benefit from [facility] and then transition to Acute Rehab once he is weaned off vent/trach. Will continue to monitor pt's progress w/ therapy.     DISCHARGE RECOMMENDATIONS  PT Discharge Recommendations: LTAC     PLAN  PT Treatment Melani Not tested  Comment : (/)        Patient End of Session: Up in chair;Needs met;Call light within reach;RN aware of session/findings; With [address] staff; All patient questions and concerns addressed; Family present    CURRENT GOALS   Goals to be met by: 2/13/20  Pa

## 2020-02-03 NOTE — PLAN OF CARE
Madi Fulton is A & O x4. Today he was trialed on CPAP from and tolerated it well from 11am-70m. He has c/o of right shoulder and low back pain relieved with PRN medication and repositioning. He was up to the chair x2 days. DVT prophylaxis in place.  Tube feeding range  Description  INTERVENTIONS:  - Monitor Blood Glucose as ordered  - Assess for signs and symptoms of hyperglycemia and hypoglycemia  - Administer ordered medications to maintain glucose within target range  - Assess barriers to adequate nutritional i Nasogastric tube to low intermittent suction as ordered  - Evaluate effectiveness of ordered antiemetic medications  - Provide nonpharmacologic comfort measures as appropriate  - Advance diet as tolerated, if ordered  - Obtain nutritional consult as needed effectiveness of vasoactive medications to optimize hemodynamic stability  - Monitor arterial and/or venous puncture sites for bleeding and/or hematoma  - Assess quality of pulses, skin color and temperature  - Assess for signs of decreased coronary artery diabetes  Outcome: Progressing  Goal: Electrolytes maintained within normal limits  Description  INTERVENTIONS:  - Monitor labs and rhythm and assess patient for signs and symptoms of electrolyte imbalances  - Administer electrolyte replacement as ordered affected body part  Description  INTERVENTIONS:  - Support and protect limb and body alignment per provider's orders  - Instruct and reinforce with patient and family use of appropriate assistive device and precautions  Outcome: Progressing

## 2020-02-03 NOTE — PROGRESS NOTES
[facility]  Progress Note    Ailin Cervantes Patient Status:  Inpatient    1940 MRN [id number]   Location [facility] 2W/SW Attending Rodrigue Fleming DO   Hosp Day # 24 PCP No primary care provider on file.        Subjecti basilar percutaneous drain in the pleural space    Dictated by (CST): Laura Bhakta MD on 2/03/2020 at 8:50     Approved by (CST): Laura Bhakta MD on 2/03/2020 at 8:53          Xr Chest Ap Portable  (cpt=71045)    Result Date: 2/2/2020  CONCLU

## 2020-02-03 NOTE — PROGRESS NOTES
[facility] ID PROGRESS NOTE    Stanley Sumner Patient Status:  Inpatient    1940 MRN [id number]   Location [facility] 2W/SW Attending Concetta Boateng Angella Day # 24 PCP No primary care provider on file.      Subjective:  Trach in place numerous previous replaced joints including B shouilders and knees. Had L THR originally in 2015 and then had to be revised due to dislocation in 2016. Per pt, since that time has had pain on and off, but more recently.  In Jan 2019 had a hip aspiration and

## 2020-02-03 NOTE — OCCUPATIONAL THERAPY NOTE
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

## 2020-02-03 NOTE — RESPIRATORY THERAPY NOTE
Pt received on full vent support: AC 16/500/+5/40%. Suction provided as indicated. Pt able to cough and mobilize secretions for suction. No changes made overnight.

## 2020-02-03 NOTE — CM/SW NOTE
Care Management/Discharge Plan:    Patient will go for repeat CT scan today and then CV will re-eval for pigtail removal.  Plan is for possible discharge tomorrow to [facility].   Wife at bedside and she is requesting that there be 2 recliner chairs in his

## 2020-02-03 NOTE — PLAN OF CARE
CPAP was once again done throughout the day and he tolerated this well. Pt was up in the chair for the Superbowl and watched it with his family. Back to bed via the sling/lift towards the end of the game with no c/o.  Pain remains in the right, upper back w Anticipate increased pain with activity and pre-medicate as appropriate  Outcome: Progressing     Problem: SAFETY ADULT - FALL  Goal: Free from fall injury  Description  INTERVENTIONS:  - Assess pt frequently for physical needs  - Identify cognitive and ph appropriate  Outcome: Progressing  Goal: Free from bleeding injury  Description  (Example usage: patient with low platelets)  INTERVENTIONS:  - Avoid intramuscular injections, enemas and rectal medication administration  - Ensure safe mobilization of patie effort  - Oxygen supplementation based on oxygen saturation or ABGs  - Provide Smoking Cessation handout, if applicable  - Encourage broncho-pulmonary hygiene including cough, deep breathe, Incentive Spirometry  - Assess the need for suctioning and perform and nasal mucous membranes remain intact and without s/s of infection  Description  INTERVENTIONS:  - Assess oral mucosa and hygiene practices  - Implement preventative oral hygiene regimen  - Administer medications as ordered  - Implement isolation precau

## 2020-02-03 NOTE — PROGRESS NOTES
[facility]    Progress Note    Jairo Blend Patient Status:  Inpatient    1940 MRN [id number]   Location [facility] 2W/SW Attending Herman Squires, DO   Hosp Day # 24 PCP No primary care provider on file.        Subjec 02/03/20  0505   GLU 98 122* 112*   BUN 52* 47* 45*   CREATSERUM 2.60* 2.52* 2.31*   GFRAA 26* 27* 30*   GFRNAA 22* 23* 26*   CA 8.3* 8.1* 8.1*    142 141   K 3.6 4.1 4.2    105 105   CO2 33.0* 33.0* 32.0          Xr Chest Ap Portable  (cpt=710

## 2020-02-03 NOTE — PROGRESS NOTES
[facility]    Progress Note    Reshma Fontanez Patient Status:  Inpatient    1940 MRN [id number]   Location [facility] 2W/SW Attending Julieth Brooks DO   Hosp Day # 24 PCP No primary care provider on file.      Subjec Pigtail catheter placed by IR on 1/31- will discuss timing of removal with IR/Surgeon and Pulm. Minimal drainage. Awaiting CXR today.    Increase activity; working with PT/OT  Pain meds as needed  Scds and anticoag prophylaxis DVT prevention   Post op DVT &

## 2020-02-03 NOTE — PROGRESS NOTES
[facility]    [facility] Note    Judithmikael Sosa Patient Status:  Inpatient    1940 MRN [id number]   Location [facility] 2W/SW Attending Minna Horner,    Hosp Day # 24 PCP No primary care provider on file.         Sub pleural effusion   Drained by IR with small ct / better on f/u ct today   Ct reviewed less effusion / mild basilar atelectasis      6- severe pulmonary HTN   PAP 67      7- acute Kidney injury / ATN    good UO     better      8-s/p revision of total replac Ground-glass opacities within the right upper lobe continued decrease in size but not entirely resolved suggestive of pneumonia or edema. Small amounts of retained secretions within the trachea and right mainstem bronchus.   Mild interstitial edema is [place]

## 2020-02-04 VITALS
TEMPERATURE: 98 F | RESPIRATION RATE: 15 BRPM | HEIGHT: 65.98 IN | BODY MASS INDEX: 34.72 KG/M2 | DIASTOLIC BLOOD PRESSURE: 64 MMHG | OXYGEN SATURATION: 100 % | HEART RATE: 65 BPM | WEIGHT: 216.06 LBS | SYSTOLIC BLOOD PRESSURE: 146 MMHG

## 2020-02-04 LAB
GLUCOSE BLDC GLUCOMTR-MCNC: 113 MG/DL (ref 70–99)
INR BLD: 3.3 (ref 0.9–1.2)
PROTHROMBIN TIME: 34.3 SECONDS (ref 11.8–14.5)

## 2020-02-04 PROCEDURE — 99239 HOSP IP/OBS DSCHRG MGMT >30: CPT | Performed by: INTERNAL MEDICINE

## 2020-02-04 RX ORDER — HYDROCODONE BITARTRATE AND ACETAMINOPHEN 5; 325 MG/1; MG/1
1 TABLET ORAL EVERY 4 HOURS PRN
Qty: 30 TABLET | Refills: 0 | Status: SHIPPED | OUTPATIENT
Start: 2020-02-04

## 2020-02-04 RX ORDER — MIRTAZAPINE 15 MG/1
15 TABLET, FILM COATED ORAL NIGHTLY PRN
Qty: 30 TABLET | Refills: 0 | Status: SHIPPED | OUTPATIENT
Start: 2020-02-04

## 2020-02-04 RX ORDER — FERROUS SULFATE 300 MG/5ML
300 LIQUID (ML) ORAL 3 TIMES DAILY
Qty: 90 EACH | Refills: 0 | Status: SHIPPED | OUTPATIENT
Start: 2020-02-04

## 2020-02-04 RX ORDER — POLYETHYLENE GLYCOL 3350 17 G/17G
17 POWDER, FOR SOLUTION ORAL DAILY
Qty: 20 EACH | Refills: 0 | Status: SHIPPED | OUTPATIENT
Start: 2020-02-04

## 2020-02-04 RX ORDER — NYSTATIN 100000 U/G
1 CREAM TOPICAL 2 TIMES DAILY
Qty: 1 TUBE | Refills: 0 | Status: SHIPPED | OUTPATIENT
Start: 2020-02-04

## 2020-02-04 RX ORDER — POLYETHYLENE GLYCOL 3350 17 G/17G
17 POWDER, FOR SOLUTION ORAL DAILY PRN
Qty: 30 EACH | Refills: 0 | Status: SHIPPED | OUTPATIENT
Start: 2020-02-04

## 2020-02-04 RX ORDER — ASCORBIC ACID 500 MG
500 TABLET ORAL 3 TIMES DAILY
Qty: 90 TABLET | Refills: 0 | Status: SHIPPED | OUTPATIENT
Start: 2020-02-04

## 2020-02-04 RX ORDER — IPRATROPIUM BROMIDE AND ALBUTEROL SULFATE 2.5; .5 MG/3ML; MG/3ML
3 SOLUTION RESPIRATORY (INHALATION)
Qty: 60 VIAL | Refills: 0 | Status: SHIPPED | OUTPATIENT
Start: 2020-02-04

## 2020-02-04 RX ORDER — WARFARIN SODIUM 2 MG/1
2 TABLET ORAL NIGHTLY
Qty: 30 TABLET | Refills: 2 | Status: SHIPPED | OUTPATIENT
Start: 2020-02-04

## 2020-02-04 RX ORDER — CARVEDILOL 3.12 MG/1
3.12 TABLET ORAL 2 TIMES DAILY WITH MEALS
Qty: 60 TABLET | Refills: 1 | Status: SHIPPED | OUTPATIENT
Start: 2020-02-04

## 2020-02-04 NOTE — PLAN OF CARE
Pt up to chair x2 today & worked with PT/OT. Becomes tired after 1 hour in the chair but this RN encouraged pt to sit up as long as possible and reminded him that he needs to keep working to improve his strength and prepare for rehab.  States \"everything h in the patient's plan of care  Description  Interventions:  - What would you like us to know as we care for you?  \"per daughter, we have a caregiver for him from 9pm to 9am\"  - Provide timely, complete, and accurate information to patient/family  - Incorp GASTROINTESTINAL - ADULT  Goal: Minimal or absence of nausea and vomiting  Description  INTERVENTIONS:  - Maintain adequate hydration with IV or PO as ordered and tolerated  - Nasogastric tube to low intermittent suction as ordered  - Evaluate effectivenes stability  Description  INTERVENTIONS:  - Monitor vital signs, rhythm, and trends  - Monitor for bleeding, hypotension and signs of decreased cardiac output  - Evaluate effectiveness of vasoactive medications to optimize hemodynamic stability  - Monitor ar response to electrolyte replacements, including rhythm and repeat lab results as appropriate  - Fluid restriction as ordered  - Instruct patient on fluid and nutrition restrictions as appropriate  Outcome: Progressing     Problem: Skin/tissue integrity  Go Control  Goal: Glucose maintained within prescribed range  Description  INTERVENTIONS:  - Monitor Blood Glucose as ordered  - Assess for signs and symptoms of hyperglycemia and hypoglycemia  - Administer ordered medications to maintain glucose within targe

## 2020-02-04 NOTE — DISCHARGE SUMMARY
[facility]    Discharge Summary    Heather Weems Patient Status:  Inpatient    1940 MRN [id number]   Location [facility] 2W/SW Attending 1 [facility], [address] Day # 25 PCP No primary care provider on file.      Date Ortho and infectious disease for infected prosthetic joint and he was on antibiotics per infectious disease  Post surgery patient developed diaphoresis and chest pain and pressure with positive troponin and patient was diagnosed with acute MI ( NSTEMI ) ev on and off with SBT  O2 requirement with FiO2 of 40% and his O2 sat was 100%  Patient was able to get up to the chair with physical therapy  Patient will need aggressive rehab and hopefully slow wean from vent on LTAC  Patient was stable on 2/4/2020 to be Laura Funez MD Consulting Physician  NEPHROLOGY     Natasha Tobias MD Consulting Physician  Physical Medicine    Oz Becerril MD Consulting Physician  Interventional, Cardiology     Rupal Figueroa MD Consulting Physician  PULMONARY DISEASES     Jarrod daily.    ipratropium-albuterol 0.5-2.5 (3) MG/3ML Inhalation Solution  Take 3 mL by nebulization 4 (four) times daily. nystatin [id number] UNIT/GM External Cream  Apply 1 Application topically 2 (two) times daily.     omeprazole 2mg/ml Oral Suspension  20 m MD.    Specialties:  Interventional, Cardiology, CARDIOLOGY  Why:  Follow up with Dr. Twan Pleitez in 1 month.   Contact information:  [address] [address] [address] [phone]                       Other Discharge Instructions: hip.   Mary Jo Marti, NP-C with Dr. Mel Majano   [phone]    Clean sternal and right leg incision daily with soap and water. Betadine swab incision 2 x day until your follow up office visit with Dr. Ignacia Aguirre.    Do not soak (submerge) your incision in water

## 2020-02-04 NOTE — PLAN OF CARE
In chair for  4 hours this morning tolerated  well. Norco taken with morning medications  for C/O  generalized aches Mild rash to inner upper inner thighs and under scrotum. Right leg incision and sternum healing well no signs of infection.    . Report leandro patient/family to participate in care and decision-making at the level they choose  - Honor patient and family perspectives and choices   Outcome: Adequate for Discharge     Problem: PAIN - ADULT  Goal: Verbalizes/displays adequate comfort level or patient Problem: HEMATOLOGIC - ADULT  Goal: Maintains hematologic stability  Description  INTERVENTIONS  - Assess for signs and symptoms of bleeding or hemorrhage  - Monitor labs and vital signs for trends  - Administer supportive blood products/factors, fluids

## 2020-02-04 NOTE — PROGRESS NOTES
[facility]    Progress Note    Jack Marker Patient Status:  Inpatient    1940 MRN [id number]   Location [facility] 2W/SW Attending Karen Schwartz DO   Hosp Day # 25 PCP No primary care provider on file.      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 PICC placed on 1/16  Carotid stenosis noted on pre op U/S= >70% on right. Pt. Aware & asymptomatic.  Plan for further evaluation della

## 2020-02-10 ENCOUNTER — TELEPHONE (OUTPATIENT)
Dept: CASE MANAGEMENT | Facility: HOSPITAL | Age: 80
End: 2020-02-10

## 2020-02-11 ENCOUNTER — TELEPHONE (OUTPATIENT)
Dept: CASE MANAGEMENT | Facility: HOSPITAL | Age: 80
End: 2020-02-11

## 2021-01-01 ENCOUNTER — EXTERNAL RECORD (OUTPATIENT)
Dept: HEALTH INFORMATION MANAGEMENT | Age: 81
End: 2021-01-01

## 2021-01-05 ENCOUNTER — V-VISIT (OUTPATIENT)
Dept: NEUROSURGERY | Age: 81
End: 2021-01-05

## 2021-01-05 ENCOUNTER — TELEPHONE (OUTPATIENT)
Dept: NEUROSURGERY | Age: 81
End: 2021-01-05

## 2021-01-05 DIAGNOSIS — M54.50 BILATERAL LOW BACK PAIN WITHOUT SCIATICA, UNSPECIFIED CHRONICITY: Primary | ICD-10-CM

## 2021-01-05 DIAGNOSIS — Z98.1 S/P LUMBAR FUSION: ICD-10-CM

## 2021-01-05 PROCEDURE — 99213 OFFICE O/P EST LOW 20 MIN: CPT | Performed by: NURSE PRACTITIONER

## 2021-02-02 DIAGNOSIS — Z23 NEED FOR VACCINATION: ICD-10-CM

## 2021-02-03 ENCOUNTER — IMAGING SERVICES (OUTPATIENT)
Dept: OTHER | Age: 81
End: 2021-02-03

## 2021-02-04 ENCOUNTER — TELEPHONE (OUTPATIENT)
Dept: NEUROSURGERY | Age: 81
End: 2021-02-04

## 2021-02-12 ENCOUNTER — TELEPHONE (OUTPATIENT)
Dept: NEUROSURGERY | Age: 81
End: 2021-02-12

## 2021-02-18 ENCOUNTER — TELEPHONE (OUTPATIENT)
Dept: NEUROSURGERY | Age: 81
End: 2021-02-18

## 2021-02-18 DIAGNOSIS — M41.9 SCOLIOSIS, UNSPECIFIED SCOLIOSIS TYPE, UNSPECIFIED SPINAL REGION: ICD-10-CM

## 2021-02-18 DIAGNOSIS — Z98.1 S/P LUMBAR FUSION: Primary | ICD-10-CM

## 2021-02-18 DIAGNOSIS — M54.16 LUMBAR RADICULOPATHY: ICD-10-CM

## 2021-02-18 DIAGNOSIS — M54.50 BILATERAL LOW BACK PAIN WITHOUT SCIATICA, UNSPECIFIED CHRONICITY: ICD-10-CM

## 2021-02-22 ENCOUNTER — TELEPHONE (OUTPATIENT)
Dept: NEUROSURGERY | Age: 81
End: 2021-02-22

## 2021-08-10 ENCOUNTER — OFFICE VISIT (OUTPATIENT)
Dept: PAIN MANAGEMENT | Age: 81
End: 2021-08-10
Attending: ANESTHESIOLOGY

## 2021-08-10 VITALS
HEIGHT: 66 IN | HEART RATE: 54 BPM | TEMPERATURE: 97.2 F | SYSTOLIC BLOOD PRESSURE: 155 MMHG | RESPIRATION RATE: 16 BRPM | BODY MASS INDEX: 33.11 KG/M2 | WEIGHT: 206 LBS | DIASTOLIC BLOOD PRESSURE: 83 MMHG

## 2021-08-10 DIAGNOSIS — M47.816 SPONDYLOSIS OF LUMBAR REGION WITHOUT MYELOPATHY OR RADICULOPATHY: Primary | ICD-10-CM

## 2021-08-10 DIAGNOSIS — M48.062 SPINAL STENOSIS OF LUMBAR REGION WITH NEUROGENIC CLAUDICATION: ICD-10-CM

## 2021-08-10 DIAGNOSIS — M54.50 CHRONIC BILATERAL LOW BACK PAIN WITHOUT SCIATICA: ICD-10-CM

## 2021-08-10 DIAGNOSIS — M51.37 DEGENERATIVE DISC DISEASE AT L5-S1 LEVEL: ICD-10-CM

## 2021-08-10 DIAGNOSIS — G89.29 CHRONIC BILATERAL LOW BACK PAIN WITHOUT SCIATICA: ICD-10-CM

## 2021-08-10 DIAGNOSIS — M47.899 FACET SYNDROME: ICD-10-CM

## 2021-08-10 DIAGNOSIS — M43.17 SPONDYLOLISTHESIS OF LUMBOSACRAL REGION: ICD-10-CM

## 2021-08-10 PROCEDURE — 99203 OFFICE O/P NEW LOW 30 MIN: CPT | Performed by: ANESTHESIOLOGY

## 2021-08-10 ASSESSMENT — PAIN SCALES - GENERAL: PAINLEVEL: 10

## 2021-08-24 ENCOUNTER — IMAGING SERVICES (OUTPATIENT)
Dept: PAIN MANAGEMENT | Age: 81
End: 2021-08-24
Attending: ANESTHESIOLOGY

## 2021-08-24 ENCOUNTER — OFFICE VISIT (OUTPATIENT)
Dept: PAIN MANAGEMENT | Age: 81
End: 2021-08-24
Attending: ANESTHESIOLOGY

## 2021-08-24 VITALS
TEMPERATURE: 98.6 F | HEART RATE: 56 BPM | DIASTOLIC BLOOD PRESSURE: 80 MMHG | SYSTOLIC BLOOD PRESSURE: 162 MMHG | OXYGEN SATURATION: 99 % | RESPIRATION RATE: 18 BRPM

## 2021-08-24 DIAGNOSIS — M54.50 LOW BACK PAIN, UNSPECIFIED BACK PAIN LATERALITY, UNSPECIFIED CHRONICITY, UNSPECIFIED WHETHER SCIATICA PRESENT: ICD-10-CM

## 2021-08-24 DIAGNOSIS — M47.816 SPONDYLOSIS OF LUMBAR REGION WITHOUT MYELOPATHY OR RADICULOPATHY: ICD-10-CM

## 2021-08-24 PROCEDURE — 64495 INJ PARAVERT F JNT L/S 3 LEV: CPT | Performed by: ANESTHESIOLOGY

## 2021-08-24 PROCEDURE — 10002800 HB RX 250 W HCPCS: Performed by: ANESTHESIOLOGY

## 2021-08-24 PROCEDURE — 10002805 HB CONTRAST AGENT: Performed by: ANESTHESIOLOGY

## 2021-08-24 PROCEDURE — 10002801 HB RX 250 W/O HCPCS: Performed by: ANESTHESIOLOGY

## 2021-08-24 PROCEDURE — 64493 INJ PARAVERT F JNT L/S 1 LEV: CPT | Performed by: ANESTHESIOLOGY

## 2021-08-24 PROCEDURE — 64494 INJ PARAVERT F JNT L/S 2 LEV: CPT | Performed by: ANESTHESIOLOGY

## 2021-08-24 RX ORDER — TRIAMCINOLONE ACETONIDE 40 MG/ML
40 INJECTION, SUSPENSION INTRA-ARTICULAR; INTRAMUSCULAR ONCE
Status: COMPLETED | OUTPATIENT
Start: 2021-08-24 | End: 2021-08-24

## 2021-08-24 RX ORDER — LIDOCAINE HYDROCHLORIDE 10 MG/ML
3 INJECTION, SOLUTION EPIDURAL; INFILTRATION; INTRACAUDAL; PERINEURAL ONCE
Status: COMPLETED | OUTPATIENT
Start: 2021-08-24 | End: 2021-08-24

## 2021-08-24 RX ADMIN — TRIAMCINOLONE ACETONIDE 40 MG: 40 INJECTION, SUSPENSION INTRA-ARTICULAR; INTRAMUSCULAR at 14:30

## 2021-08-24 RX ADMIN — LIDOCAINE HYDROCHLORIDE 30 MG: 10 INJECTION, SOLUTION EPIDURAL; INFILTRATION; INTRACAUDAL; PERINEURAL at 14:20

## 2021-08-24 RX ADMIN — IOHEXOL 2 ML: 180 INJECTION INTRAVENOUS at 14:25

## 2021-08-25 ENCOUNTER — TELEPHONE (OUTPATIENT)
Dept: PAIN MANAGEMENT | Age: 81
End: 2021-08-25

## 2021-08-26 ENCOUNTER — TELEPHONE (OUTPATIENT)
Dept: PAIN MANAGEMENT | Age: 81
End: 2021-08-26

## 2021-08-26 DIAGNOSIS — M47.816 SPONDYLOSIS OF LUMBAR REGION WITHOUT MYELOPATHY OR RADICULOPATHY: Primary | ICD-10-CM

## 2021-09-14 ENCOUNTER — IMAGING SERVICES (OUTPATIENT)
Dept: PAIN MANAGEMENT | Age: 81
End: 2021-09-14
Attending: ANESTHESIOLOGY

## 2021-09-14 ENCOUNTER — OFFICE VISIT (OUTPATIENT)
Dept: PAIN MANAGEMENT | Age: 81
End: 2021-09-14
Attending: ANESTHESIOLOGY

## 2021-09-14 VITALS
DIASTOLIC BLOOD PRESSURE: 79 MMHG | RESPIRATION RATE: 18 BRPM | HEART RATE: 79 BPM | TEMPERATURE: 95.4 F | SYSTOLIC BLOOD PRESSURE: 148 MMHG

## 2021-09-14 DIAGNOSIS — M47.816 SPONDYLOSIS OF LUMBAR REGION WITHOUT MYELOPATHY OR RADICULOPATHY: Primary | ICD-10-CM

## 2021-09-14 DIAGNOSIS — M43.06 SPONDYLOLYSIS, LUMBAR REGION: ICD-10-CM

## 2021-09-14 PROCEDURE — 10002805 HB CONTRAST AGENT: Performed by: ANESTHESIOLOGY

## 2021-09-14 PROCEDURE — 10002801 HB RX 250 W/O HCPCS: Performed by: ANESTHESIOLOGY

## 2021-09-14 PROCEDURE — 64493 INJ PARAVERT F JNT L/S 1 LEV: CPT | Performed by: ANESTHESIOLOGY

## 2021-09-14 RX ORDER — LIDOCAINE HYDROCHLORIDE 10 MG/ML
5 INJECTION, SOLUTION EPIDURAL; INFILTRATION; INTRACAUDAL; PERINEURAL ONCE
Status: COMPLETED | OUTPATIENT
Start: 2021-09-14 | End: 2021-09-14

## 2021-09-14 RX ORDER — LIDOCAINE HYDROCHLORIDE 20 MG/ML
2 INJECTION, SOLUTION EPIDURAL; INFILTRATION; INTRACAUDAL; PERINEURAL ONCE
Status: COMPLETED | OUTPATIENT
Start: 2021-09-14 | End: 2021-09-14

## 2021-09-14 RX ADMIN — LIDOCAINE HYDROCHLORIDE 50 MG: 10 INJECTION, SOLUTION EPIDURAL; INFILTRATION; INTRACAUDAL; PERINEURAL at 11:40

## 2021-09-14 RX ADMIN — IOHEXOL 2 ML: 180 INJECTION INTRAVENOUS at 11:45

## 2021-09-14 RX ADMIN — Medication 40 MG: at 11:46

## 2021-09-15 ENCOUNTER — TELEPHONE (OUTPATIENT)
Dept: PAIN MANAGEMENT | Age: 81
End: 2021-09-15

## 2021-09-23 ENCOUNTER — IMAGING SERVICES (OUTPATIENT)
Dept: OTHER | Age: 81
End: 2021-09-23

## 2021-10-04 ENCOUNTER — TELEPHONE (OUTPATIENT)
Dept: PAIN MANAGEMENT | Age: 81
End: 2021-10-04

## 2021-10-05 ENCOUNTER — OFFICE VISIT (OUTPATIENT)
Dept: PAIN MANAGEMENT | Age: 81
End: 2021-10-05
Attending: ANESTHESIOLOGY

## 2021-10-05 VITALS
RESPIRATION RATE: 18 BRPM | DIASTOLIC BLOOD PRESSURE: 81 MMHG | HEART RATE: 68 BPM | SYSTOLIC BLOOD PRESSURE: 161 MMHG | TEMPERATURE: 98.1 F

## 2021-10-05 DIAGNOSIS — M47.816 SPONDYLOSIS OF LUMBAR REGION WITHOUT MYELOPATHY OR RADICULOPATHY: ICD-10-CM

## 2021-10-05 PROCEDURE — 10002800 HB RX 250 W HCPCS: Performed by: ANESTHESIOLOGY

## 2021-10-05 PROCEDURE — 10002801 HB RX 250 W/O HCPCS: Performed by: ANESTHESIOLOGY

## 2021-10-05 PROCEDURE — 64635 DESTROY LUMB/SAC FACET JNT: CPT | Performed by: ANESTHESIOLOGY

## 2021-10-05 RX ORDER — TRIAMCINOLONE ACETONIDE 40 MG/ML
40 INJECTION, SUSPENSION INTRA-ARTICULAR; INTRAMUSCULAR ONCE
Status: COMPLETED | OUTPATIENT
Start: 2021-10-05 | End: 2021-10-05

## 2021-10-05 RX ORDER — LIDOCAINE HYDROCHLORIDE 10 MG/ML
2 INJECTION, SOLUTION EPIDURAL; INFILTRATION; INTRACAUDAL; PERINEURAL ONCE
Status: COMPLETED | OUTPATIENT
Start: 2021-10-05 | End: 2021-10-05

## 2021-10-05 RX ORDER — BUPIVACAINE HYDROCHLORIDE 2.5 MG/ML
2 INJECTION, SOLUTION EPIDURAL; INFILTRATION; INTRACAUDAL ONCE
Status: COMPLETED | OUTPATIENT
Start: 2021-10-05 | End: 2021-10-05

## 2021-10-05 RX ORDER — LIDOCAINE HYDROCHLORIDE 20 MG/ML
3 INJECTION, SOLUTION EPIDURAL; INFILTRATION; INTRACAUDAL; PERINEURAL ONCE
Status: COMPLETED | OUTPATIENT
Start: 2021-10-05 | End: 2021-10-05

## 2021-10-05 RX ADMIN — Medication 60 MG: at 15:30

## 2021-10-05 RX ADMIN — BUPIVACAINE HYDROCHLORIDE 5 MG: 2.5 INJECTION, SOLUTION EPIDURAL; INFILTRATION; INTRACAUDAL at 15:30

## 2021-10-05 RX ADMIN — TRIAMCINOLONE ACETONIDE 40 MG: 40 INJECTION, SUSPENSION INTRA-ARTICULAR; INTRAMUSCULAR at 15:40

## 2021-10-05 RX ADMIN — LIDOCAINE HYDROCHLORIDE 20 MG: 10 INJECTION, SOLUTION EPIDURAL; INFILTRATION; INTRACAUDAL; PERINEURAL at 15:20

## 2021-10-06 ENCOUNTER — TELEPHONE (OUTPATIENT)
Dept: PAIN MANAGEMENT | Age: 81
End: 2021-10-06

## 2021-11-16 ENCOUNTER — OFFICE VISIT (OUTPATIENT)
Dept: PAIN MANAGEMENT | Age: 81
End: 2021-11-16
Attending: ANESTHESIOLOGY

## 2021-11-16 VITALS
TEMPERATURE: 96.4 F | DIASTOLIC BLOOD PRESSURE: 63 MMHG | RESPIRATION RATE: 16 BRPM | HEART RATE: 60 BPM | SYSTOLIC BLOOD PRESSURE: 140 MMHG

## 2021-11-16 DIAGNOSIS — M96.1 FAILED BACK SYNDROME OF LUMBAR SPINE: ICD-10-CM

## 2021-11-16 DIAGNOSIS — M46.1 SACROILIITIS (CMD): Primary | ICD-10-CM

## 2021-11-16 DIAGNOSIS — M47.816 SPONDYLOSIS OF LUMBAR REGION WITHOUT MYELOPATHY OR RADICULOPATHY: ICD-10-CM

## 2021-11-16 DIAGNOSIS — M54.50 LOW BACK PAIN, UNSPECIFIED BACK PAIN LATERALITY, UNSPECIFIED CHRONICITY, UNSPECIFIED WHETHER SCIATICA PRESENT: ICD-10-CM

## 2021-11-16 PROCEDURE — 99214 OFFICE O/P EST MOD 30 MIN: CPT | Performed by: ANESTHESIOLOGY

## 2021-11-16 ASSESSMENT — PAIN SCALES - GENERAL: PAINLEVEL: 10

## 2021-12-03 ENCOUNTER — TELEPHONE (OUTPATIENT)
Dept: NEUROSURGERY | Age: 81
End: 2021-12-03

## 2021-12-03 DIAGNOSIS — M54.16 LUMBAR RADICULOPATHY: Primary | ICD-10-CM

## 2021-12-13 ENCOUNTER — IMAGING SERVICES (OUTPATIENT)
Dept: OTHER | Age: 81
End: 2021-12-13

## 2021-12-14 ENCOUNTER — TELEPHONE (OUTPATIENT)
Dept: NEUROSURGERY | Age: 81
End: 2021-12-14

## 2021-12-22 ENCOUNTER — IMAGING SERVICES (OUTPATIENT)
Dept: OTHER | Age: 81
End: 2021-12-22

## 2022-01-14 ENCOUNTER — TELEPHONE (OUTPATIENT)
Dept: NEUROSURGERY | Age: 82
End: 2022-01-14

## 2022-02-21 ENCOUNTER — OFFICE VISIT (OUTPATIENT)
Dept: NEUROSURGERY | Age: 82
End: 2022-02-21

## 2022-02-21 VITALS
WEIGHT: 207 LBS | TEMPERATURE: 97.4 F | DIASTOLIC BLOOD PRESSURE: 79 MMHG | BODY MASS INDEX: 33.27 KG/M2 | SYSTOLIC BLOOD PRESSURE: 173 MMHG | HEIGHT: 66 IN

## 2022-02-21 DIAGNOSIS — M54.50 CHRONIC BILATERAL LOW BACK PAIN WITHOUT SCIATICA: Primary | ICD-10-CM

## 2022-02-21 DIAGNOSIS — G89.29 CHRONIC BILATERAL LOW BACK PAIN WITHOUT SCIATICA: Primary | ICD-10-CM

## 2022-02-21 PROCEDURE — 99213 OFFICE O/P EST LOW 20 MIN: CPT | Performed by: NEUROLOGICAL SURGERY

## 2022-02-21 RX ORDER — ALLOPURINOL 100 MG/1
TABLET ORAL
COMMUNITY
Start: 2022-01-31

## 2022-02-21 RX ORDER — ESCITALOPRAM OXALATE 20 MG/1
TABLET ORAL
COMMUNITY
Start: 2022-01-31

## 2022-02-21 RX ORDER — ASPIRIN 81 MG/1
TABLET, COATED ORAL
COMMUNITY
Start: 2022-01-31

## 2022-08-02 ENCOUNTER — HOSPITAL ENCOUNTER (OUTPATIENT)
Dept: PULMONOLOGY | Age: 82
Discharge: STILL ADMITTED OR ADMINISTRATIVE | End: 2022-08-02
Attending: INTERNAL MEDICINE

## 2022-08-02 VITALS — HEIGHT: 66 IN | BODY MASS INDEX: 33.41 KG/M2

## 2022-08-02 PROCEDURE — G0238 OTH RESP PROC, INDIV: HCPCS

## 2022-08-02 PROCEDURE — G0237 THERAPEUTIC PROCD STRG ENDUR: HCPCS

## 2022-08-02 ASSESSMENT — LIFESTYLE VARIABLES
SMOKING_YEARS: 25
PACKS_PER_DAY: 0.25
SMOKING_TYPE: CIGARETTES

## 2022-08-02 ASSESSMENT — SLEEP AND FATIGUE QUESTIONNAIRES
SLEEP QUALITY: GOOD
SLEEP EQUIPMENT AT HOME: BIPAP
WHERE DO YOU SLEEP: BED
DO YOU TAKE MEDICATIONS FOR SLEEP: NO

## 2022-08-02 ASSESSMENT — PATIENT HEALTH QUESTIONNAIRE - PHQ9
9. THOUGHTS THAT YOU WOULD BE BETTER OFF DEAD, OR OF HURTING YOURSELF: NOT AT ALL
1. LITTLE INTEREST OR PLEASURE IN DOING THINGS: NOT AT ALL
3. TROUBLE FALLING OR STAYING ASLEEP OR SLEEPING TOO MUCH: NOT AT ALL
2. FEELING DOWN, DEPRESSED OR HOPELESS: NOT AT ALL
5. POOR APPETITE OR OVEREATING: NOT AT ALL
6. FEELING BAD ABOUT YOURSELF - OR THAT YOU ARE A FAILURE OR HAVE LET YOURSELF OR YOUR FAMILY DOWN: NOT AT ALL
7. TROUBLE CONCENTRATING ON THINGS, SUCH AS READING THE NEWSPAPER OR WATCHING TELEVISION: NOT AT ALL
SUM OF ALL RESPONSES TO PHQ QUESTIONS 1-9: 1
8. MOVING OR SPEAKING SO SLOWLY THAT OTHER PEOPLE COULD HAVE NOTICED. OR THE OPPOSITE, BEING SO FIGETY OR RESTLESS THAT YOU HAVE BEEN MOVING AROUND A LOT MORE THAN USUAL: NOT AT ALL
4. FEELING TIRED OR HAVING LITTLE ENERGY: SEVERAL DAYS
10. IF YOU CHECKED OFF ANY PROBLEMS, HOW DIFFICULT HAVE THESE PROBLEMS MADE IT FOR YOU TO DO YOUR WORK, TAKE CARE OF THINGS AT HOME, OR GET ALONG WITH OTHER PEOPLE: SOMEWHAT DIFFICULT

## 2022-08-02 ASSESSMENT — 6 MINUTE WALK TEST (6MWT)
DID PATIENT PARTICIPATE IN 6 MINUTE WALK TEST: YES
TOTAL DISTANCE WALKED (FT): 244
NUMBER OF RESTS DURING INITIAL: 1

## 2022-08-04 ENCOUNTER — HOSPITAL ENCOUNTER (OUTPATIENT)
Dept: PULMONOLOGY | Age: 82
Discharge: STILL ADMITTED OR ADMINISTRATIVE | End: 2022-08-04
Attending: INTERNAL MEDICINE

## 2022-08-04 PROCEDURE — G0239 OTH RESP PROC, GROUP: HCPCS

## 2022-08-09 ENCOUNTER — HOSPITAL ENCOUNTER (OUTPATIENT)
Dept: PULMONOLOGY | Age: 82
Discharge: STILL ADMITTED OR ADMINISTRATIVE | End: 2022-08-09
Attending: INTERNAL MEDICINE

## 2022-08-09 PROCEDURE — G0239 OTH RESP PROC, GROUP: HCPCS

## 2022-08-11 ENCOUNTER — HOSPITAL ENCOUNTER (OUTPATIENT)
Dept: PULMONOLOGY | Age: 82
Discharge: STILL ADMITTED OR ADMINISTRATIVE | End: 2022-08-11
Attending: INTERNAL MEDICINE

## 2022-08-11 PROCEDURE — G0239 OTH RESP PROC, GROUP: HCPCS

## 2022-08-16 ENCOUNTER — HOSPITAL ENCOUNTER (OUTPATIENT)
Dept: PULMONOLOGY | Age: 82
Discharge: STILL ADMITTED OR ADMINISTRATIVE | End: 2022-08-16
Attending: INTERNAL MEDICINE

## 2022-08-16 PROCEDURE — G0239 OTH RESP PROC, GROUP: HCPCS

## 2022-08-18 ENCOUNTER — HOSPITAL ENCOUNTER (OUTPATIENT)
Dept: PULMONOLOGY | Age: 82
Discharge: STILL ADMITTED OR ADMINISTRATIVE | End: 2022-08-18
Attending: INTERNAL MEDICINE

## 2022-08-18 PROCEDURE — G0239 OTH RESP PROC, GROUP: HCPCS

## 2022-08-23 ENCOUNTER — APPOINTMENT (OUTPATIENT)
Dept: PULMONOLOGY | Age: 82
End: 2022-08-23
Attending: INTERNAL MEDICINE

## 2022-08-25 ENCOUNTER — HOSPITAL ENCOUNTER (OUTPATIENT)
Dept: PULMONOLOGY | Age: 82
Discharge: STILL ADMITTED OR ADMINISTRATIVE | End: 2022-08-25
Attending: INTERNAL MEDICINE

## 2022-08-25 PROCEDURE — G0239 OTH RESP PROC, GROUP: HCPCS

## 2022-08-30 ENCOUNTER — HOSPITAL ENCOUNTER (OUTPATIENT)
Dept: PULMONOLOGY | Age: 82
Discharge: STILL ADMITTED OR ADMINISTRATIVE | End: 2022-08-30
Attending: INTERNAL MEDICINE

## 2022-08-30 PROCEDURE — G0239 OTH RESP PROC, GROUP: HCPCS

## 2022-09-01 ENCOUNTER — HOSPITAL ENCOUNTER (OUTPATIENT)
Dept: PULMONOLOGY | Age: 82
Discharge: STILL ADMITTED OR ADMINISTRATIVE | End: 2022-09-01
Attending: INTERNAL MEDICINE

## 2022-09-01 VITALS — HEIGHT: 66 IN | BODY MASS INDEX: 33.41 KG/M2

## 2022-09-01 PROCEDURE — G0239 OTH RESP PROC, GROUP: HCPCS

## 2022-09-01 ASSESSMENT — LIFESTYLE VARIABLES
SMOKING_YEARS: 25
PACKS_PER_DAY: 0.25
SMOKING_TYPE: CIGARETTES

## 2022-09-01 ASSESSMENT — SLEEP AND FATIGUE QUESTIONNAIRES
SLEEP QUALITY: GOOD
SLEEP EQUIPMENT AT HOME: BIPAP
DO YOU TAKE MEDICATIONS FOR SLEEP: NO
WHERE DO YOU SLEEP: BED

## 2022-09-06 ENCOUNTER — APPOINTMENT (OUTPATIENT)
Dept: PULMONOLOGY | Age: 82
End: 2022-09-06
Attending: INTERNAL MEDICINE

## 2022-09-08 ENCOUNTER — APPOINTMENT (OUTPATIENT)
Dept: PULMONOLOGY | Age: 82
End: 2022-09-08
Attending: INTERNAL MEDICINE

## 2022-09-08 ENCOUNTER — HOSPITAL ENCOUNTER (OUTPATIENT)
Dept: PULMONOLOGY | Age: 82
Discharge: STILL ADMITTED OR ADMINISTRATIVE | End: 2022-09-08
Attending: INTERNAL MEDICINE

## 2022-09-08 PROCEDURE — G0239 OTH RESP PROC, GROUP: HCPCS

## 2022-09-13 ENCOUNTER — APPOINTMENT (OUTPATIENT)
Dept: PULMONOLOGY | Age: 82
End: 2022-09-13
Attending: INTERNAL MEDICINE

## 2022-09-15 ENCOUNTER — APPOINTMENT (OUTPATIENT)
Dept: PULMONOLOGY | Age: 82
End: 2022-09-15
Attending: INTERNAL MEDICINE

## 2022-09-20 ENCOUNTER — APPOINTMENT (OUTPATIENT)
Dept: PULMONOLOGY | Age: 82
End: 2022-09-20
Attending: INTERNAL MEDICINE

## 2022-09-20 ENCOUNTER — HOSPITAL ENCOUNTER (OUTPATIENT)
Dept: PULMONOLOGY | Age: 82
Discharge: STILL ADMITTED OR ADMINISTRATIVE | End: 2022-09-20
Attending: INTERNAL MEDICINE

## 2022-09-22 ENCOUNTER — APPOINTMENT (OUTPATIENT)
Dept: PULMONOLOGY | Age: 82
End: 2022-09-22
Attending: INTERNAL MEDICINE

## 2022-09-22 ASSESSMENT — LIFESTYLE VARIABLES
SMOKING_TYPE: CIGARETTES
PACKS_PER_DAY: 0.25
SMOKING_YEARS: 25

## 2022-09-27 ENCOUNTER — APPOINTMENT (OUTPATIENT)
Dept: PULMONOLOGY | Age: 82
End: 2022-09-27
Attending: INTERNAL MEDICINE

## 2022-09-29 ENCOUNTER — APPOINTMENT (OUTPATIENT)
Dept: PULMONOLOGY | Age: 82
End: 2022-09-29
Attending: INTERNAL MEDICINE

## 2022-10-04 ENCOUNTER — APPOINTMENT (OUTPATIENT)
Dept: PULMONOLOGY | Age: 82
End: 2022-10-04
Attending: INTERNAL MEDICINE

## 2022-10-06 ENCOUNTER — APPOINTMENT (OUTPATIENT)
Dept: PULMONOLOGY | Age: 82
End: 2022-10-06
Attending: INTERNAL MEDICINE

## 2022-10-11 ENCOUNTER — APPOINTMENT (OUTPATIENT)
Dept: PULMONOLOGY | Age: 82
End: 2022-10-11
Attending: INTERNAL MEDICINE

## 2022-10-13 ENCOUNTER — APPOINTMENT (OUTPATIENT)
Dept: PULMONOLOGY | Age: 82
End: 2022-10-13
Attending: INTERNAL MEDICINE

## 2022-10-18 ENCOUNTER — APPOINTMENT (OUTPATIENT)
Dept: PULMONOLOGY | Age: 82
End: 2022-10-18
Attending: INTERNAL MEDICINE

## 2022-10-20 ENCOUNTER — APPOINTMENT (OUTPATIENT)
Dept: PULMONOLOGY | Age: 82
End: 2022-10-20
Attending: INTERNAL MEDICINE

## 2022-10-25 ENCOUNTER — APPOINTMENT (OUTPATIENT)
Dept: PULMONOLOGY | Age: 82
End: 2022-10-25
Attending: INTERNAL MEDICINE

## 2022-10-27 ENCOUNTER — APPOINTMENT (OUTPATIENT)
Dept: PULMONOLOGY | Age: 82
End: 2022-10-27
Attending: INTERNAL MEDICINE

## 2024-01-07 NOTE — RESPIRATORY THERAPY NOTE
Received pt.  On ventilator   Settings- A/C V/C 16/500/40%/+5  ETT- Size 7.5, 25 at the lip  Suctioned pt. as needed  No changes made overnight    Current ventilator readings-     01/27/20 0540   Vent Information   Vent Mode VC/AC   Settings   FiO2 (%) 40 % You can access the FollowMyHealth Patient Portal offered by [facility] by registering at the following website: [email or web address]. By joining [employer]’s FollowMyHealth portal, you will also be able to view your health information using other applications (apps) compatible with our system. You can access the FollowMyHealth Patient Portal offered by [facility] by registering at the following website: [email or web address]. By joining [employer]’s FollowMyHealth portal, you will also be able to view your health information using other applications (apps) compatible with our system.

## (undated) DEVICE — FLOSEAL HEMOSTATIC MATRIX, 5ML: Brand: FLOSEAL HEMOSTATIC MATRIX

## (undated) DEVICE — CARD SMRT MEDISTEM VERIQ TRNST

## (undated) DEVICE — SUCTION CANISTER, 3000CC,SAFELINER: Brand: DEROYAL

## (undated) DEVICE — 3M™ MEDITPORE™ SOFT CLOTH TAPE 6 IN X 10 YD 12 ROLLS/CASE 2966: Brand: 3M™ MEDIPORE™

## (undated) DEVICE — SUTURE MONOCRYL 3-0 Y936H

## (undated) DEVICE — 12 ML SYRINGE LUER-LOCK TIP: Brand: MONOJECT

## (undated) DEVICE — 3M™ STERI-DRAPE™ U-DRAPE 1015: Brand: STERI-DRAPE™

## (undated) DEVICE — SUTURE ETHILON 3-0 669H

## (undated) DEVICE — SOLUTION SURG DURA PREP HAZMAT

## (undated) DEVICE — 6 ML SYRINGE LUER-LOCK TIP: Brand: MONOJECT

## (undated) DEVICE — GAMMEX® PI HYBRID SIZE 8, STERILE POWDER-FREE SURGICAL GLOVE, POLYISOPRENE AND NEOPRENE BLEND: Brand: GAMMEX

## (undated) DEVICE — Device

## (undated) DEVICE — Device: Brand: CUSTOM PROCEDURE KIT

## (undated) DEVICE — EZ GLIDE AORTIC CANNULA: Brand: EDWARDS LIFESCIENCES EZ GLIDE AORTIC CANNULA

## (undated) DEVICE — PUNCH AORTIC 4.0 LONG HANDLE

## (undated) DEVICE — SUTURE ETHILON 2-0 FS

## (undated) DEVICE — SUTURE SILK 4-0 SA63H

## (undated) DEVICE — PACK CDS TOTAL HIP

## (undated) DEVICE — ENCORE® LATEX MICRO SIZE 8.5, STERILE LATEX POWDER-FREE SURGICAL GLOVE: Brand: ENCORE

## (undated) DEVICE — PEN: MARKING STD PT 100/CS: Brand: MEDICAL ACTION INDUSTRIES

## (undated) DEVICE — CONNECTOR PRFSN QCK PRM .25IN

## (undated) DEVICE — CONTAINER CLIKSEAL PP 4OZ BLU

## (undated) DEVICE — T5 HOOD WITH PEEL AWAY FACE SHIELD

## (undated) DEVICE — HEAD & NECK: Brand: MEDLINE INDUSTRIES, INC.

## (undated) DEVICE — 20 ML SYRINGE LUER-LOCK TIP: Brand: MONOJECT

## (undated) DEVICE — 2.3MM X 19.0MM TAPERED ROUTER

## (undated) DEVICE — DEVICE BLWR/MSTR ACCUMIST ATCH

## (undated) DEVICE — BLADE 11 SHRP BP SS SRG STRL

## (undated) DEVICE — PAD PLMM SLVR 12.5X4IN SLVR

## (undated) DEVICE — YANKAUER SUCTION INSTRUMENT NO CONTROL VENT, BULB TIP, CLEAR: Brand: YANKAUER

## (undated) DEVICE — CULTURE TUBE ANAEROBIC

## (undated) DEVICE — THORACIC CATHETER, STRAIGHT, SILICONE, WITH CLOTSTOP®: Brand: AXIOM® ATRAUM™ WITH CLOTSTOP®

## (undated) DEVICE — LINE MNTR ADLT SET O2 INTMD

## (undated) DEVICE — SUTURE SILK 1-0 SA87G

## (undated) DEVICE — SINGLE USE SUCTION VALVE MAJ-209: Brand: SINGLE USE SUCTION VALVE (STERILE)

## (undated) DEVICE — BATTERY

## (undated) DEVICE — 2T8 #2 PDO 24 X 24: Brand: 2T8 #2 PDO 24 X 24

## (undated) DEVICE — MASK PROC MASK SOFT WHITE

## (undated) DEVICE — AIRLIFE™ MISTY FAST™ SMALL VOLUME NEBULIZER WITH 7 FOOT (2.1 M) CRUSH RESISTANT OXYGEN TUBING, BAFFLED MOUTHPIECE, AND 6 INCH (15 CM) FLEXTUBE: Brand: AIRLIFE™

## (undated) DEVICE — HEART A: Brand: MEDLINE INDUSTRIES, INC.

## (undated) DEVICE — GAUZE PACKING IODOFORM 1/2X5

## (undated) DEVICE — ENCORE® LATEX MICRO SIZE 7, STERILE LATEX POWDER-FREE SURGICAL GLOVE: Brand: ENCORE

## (undated) DEVICE — SUTURE PROLENE 3-0 SH

## (undated) DEVICE — ABSORBABLE HEMOSTAT (OXIDIZED REGENERATED CELLULOSE, U.S.P.): Brand: SURGICEL

## (undated) DEVICE — SUTURE PROLENE 7-0 BV175-6

## (undated) DEVICE — CHLORAPREP 26ML APPLICATOR

## (undated) DEVICE — SUTURE SILK 2-0 SH

## (undated) DEVICE — SOL  .9 1000ML BAG

## (undated) DEVICE — FAN SPRAY KIT: Brand: PULSAVAC®

## (undated) DEVICE — NEEDLE HPO 18GA 1.5IN ECLPS

## (undated) DEVICE — SUTURE CHROMIC GUT 2-0 SH

## (undated) DEVICE — CATH SECURING DEVICE STATLOCK

## (undated) DEVICE — TRAY SKIN PREP PVP-1

## (undated) DEVICE — 12 FOOT DISPOSABLE EXTENSION CABLE WITH SAFE CONNECT / SCREW-DOWN

## (undated) DEVICE — SUTURE PROLENE 7-0 8701H

## (undated) DEVICE — CS5/5+ FASTPACK, 225ML 150U RES: Brand: HAEMONETICS CELL SAVER 5/5+ SYSTEMS

## (undated) DEVICE — BLADE SAW SAGITTAL 19.5

## (undated) DEVICE — GAMMEX® NON-LATEX PI ORTHO SIZE 8.5, STERILE POLYISOPRENE POWDER-FREE SURGICAL GLOVE: Brand: GAMMEX

## (undated) DEVICE — Device: Brand: PORTEX

## (undated) DEVICE — PROXIMATE SKIN STAPLERS (35 WIDE) CONTAINS 35 STAINLESS STEEL STAPLES (FIXED HEAD): Brand: PROXIMATE

## (undated) DEVICE — CONMED SCOPE SAVER BITE BLOCK, 20X27 MM: Brand: SCOPE SAVER

## (undated) DEVICE — SUTURE PROLENE 5-0 C-1

## (undated) DEVICE — SUTURE PDS II 1 CTX

## (undated) DEVICE — SUTURE PROLENE 6-0 C-1

## (undated) DEVICE — MEDI-VAC NON-CONDUCTIVE SUCTION TUBING: Brand: CARDINAL HEALTH

## (undated) DEVICE — Device: Brand: ZDRIVE™

## (undated) DEVICE — SHEET,DRAPE,70X100,STERILE: Brand: MEDLINE

## (undated) DEVICE — CULTURE COLLECT/TRANSPORT SYS

## (undated) DEVICE — CURVED, SMALL JAW, OPEN SEALER/DIVIDER: Brand: LIGASURE

## (undated) DEVICE — SUTURE VICRYL 1-0 J977H

## (undated) DEVICE — SUTURE MONOCRYL 2-0 Y945H

## (undated) DEVICE — THORACIC CATHETER, RIGHT ANGLE, SILICONE, WITH CLOTSTOP®: Brand: AXIOM® ATRAUM™ WITH CLOTSTOP®

## (undated) DEVICE — GAMMEX® PI HYBRID SIZE 8.5, STERILE POWDER-FREE SURGICAL GLOVE, POLYISOPRENE AND NEOPRENE BLEND: Brand: GAMMEX

## (undated) DEVICE — SOL  .9 1000ML BTL

## (undated) DEVICE — SUTURE WIRE DOUBLE STERNOTOMY

## (undated) DEVICE — PILLOW ABDUCTION HIP MED

## (undated) DEVICE — RETROGRADE CARDIOPLEGIA CATHETER: Brand: EDWARDS LIFESCIENCES RETROGRADE CARDIOPLEGIA CATHETER

## (undated) DEVICE — GAMMEX® NON-LATEX PI ORTHO SIZE 6.5, STERILE POLYISOPRENE POWDER-FREE SURGICAL GLOVE: Brand: GAMMEX

## (undated) DEVICE — STABILIZER SRG UNV AST CABG

## (undated) DEVICE — SUTURE PROLENE 7-0 BV-1

## (undated) DEVICE — SUTURE SILK 0 FSL

## (undated) DEVICE — ENCORE® LATEX MICRO SIZE 6.5, STERILE LATEX POWDER-FREE SURGICAL GLOVE: Brand: ENCORE

## (undated) DEVICE — FORESIGHT LARGE SENSOR: Brand: FORESIGHT

## (undated) DEVICE — SUTURE SURGICAL STEEL #7

## (undated) DEVICE — ARISTA 1 GRAM

## (undated) DEVICE — 2T11 #2 PDO 36 X 36: Brand: 2T11 #2 PDO 36 X 36

## (undated) DEVICE — 3 ML SYRINGE LUER-LOCK TIP: Brand: MONOJECT

## (undated) DEVICE — SUTURE ETHIBOND 0 CT-1

## (undated) DEVICE — BLADE ELECTRODE: Brand: EDGE

## (undated) DEVICE — SUTURE PDS II 2-0 CT-1

## (undated) DEVICE — SUTURE PDS II 0 CT-1

## (undated) DEVICE — SUTURE PROLENE 4-0 BB

## (undated) DEVICE — [HIGH FLOW INSUFFLATOR,  DO NOT USE IF PACKAGE IS DAMAGED,  KEEP DRY,  KEEP AWAY FROM SUNLIGHT,  PROTECT FROM HEAT AND RADIOACTIVE SOURCES.]: Brand: PNEUMOSURE

## (undated) DEVICE — SINGLE USE BIOPSY VALVE MAJ-210: Brand: SINGLE USE BIOPSY VALVE (STERILE)

## (undated) DEVICE — SOL  .9 3000ML

## (undated) DEVICE — SYRINGE 10ML SLIP TIP

## (undated) DEVICE — ALARM PT PTCH LVL

## (undated) DEVICE — PACK ASSRY CUSTOM TUBING

## (undated) DEVICE — KIT DRN 1/8IN PVC 3 SPRG EVAC

## (undated) DEVICE — SUTURE SILK 2-0 SA85H

## (undated) DEVICE — SUTURE PDS II 1 CT-1

## (undated) DEVICE — DRAIN SPONGES,6 PLY: Brand: EXCILON

## (undated) DEVICE — CONTAINER SPEC STR 4OZ GRY LID

## (undated) DEVICE — 3M™ BAIR HUGGER® UNDERBODY BLANKET, FULL ACCESS, 10 PER CASE 63500: Brand: BAIR HUGGER™

## (undated) DEVICE — CARTRIDGE HC HMS+ CRTDG SYR

## (undated) DEVICE — TRAY FOLEY BDX 16F STATLOCK

## (undated) DEVICE — INSERT SUTURE OPEN HEART

## (undated) DEVICE — PACK CUSTOM TUBING

## (undated) DEVICE — ADAPTER CRDPLG ANTGRD RTRGD 3W

## (undated) DEVICE — 2T9 -0- UNDYED MONODERM 36X36: Brand: 2T9 -0- UNDYED MONODERM 36X36

## (undated) DEVICE — DRAPE SLUSH/WARMER W/DISC

## (undated) DEVICE — HEART DRAPE & SUPPLY PACK: Brand: MEDLINE INDUSTRIES, INC.

## (undated) DEVICE — STERILE TETRA-FLEX CF, ELASTIC BANDAGE LATEX FREE 6IN X5.5 YD: Brand: TETRA-FLEX™CF

## (undated) DEVICE — 3M(TM) TEGADERM(TM) TRANSPARENT FILM DRESSING FRAME STYLE 1628: Brand: 3M™ TEGADERM™

## (undated) DEVICE — LEAD BIPOLAR TEMP 6495XF53

## (undated) DEVICE — BIT DRL 30MM 3.2MM RNGLC ACTB

## (undated) DEVICE — 35 ML SYRINGE REGULAR TIP: Brand: MONOJECT

## (undated) DEVICE — TRAY ENDOVEIN KTV16 HARVESTING

## (undated) DEVICE — STERILE TETRA-FLEX CF, ELASTIC BANDAGE, 4" X 5.5YD: Brand: TETRA-FLEX™CF

## (undated) DEVICE — 2DSM24 2-0 UND MONODERM 30X30: Brand: 2DSM24 2-0 UND MONODERM 30X30

## (undated) NOTE — LETTER
[address] [address]     I agree to have a Peripherally Inserted Central Catheter (PICC) placed in my arm.      1. The PICC insertion procedure, care, maintenance, risks, benefits, and complications Statement of Physician: My signature below affirms that prior to the time of the PICC line insertion, I have explained to the patient and/or his/her legal representative, the risks and benefits involved in the proposed treatment and any reasonable alternat

## (undated) NOTE — LETTER
[address], [place], [place]     AUTHORIZATION FOR SURGICAL OPERATION OR PROCEDURE    I hereby authorize Jasper Bergman MD, my Physician(s) and whomever may be designated as the doctor's Assistant, to perform the following purposes of advancing medicine, science and/or education, provided my identity is not revealed. If the procedure has been videotaped, the physician/surgeon will obtain the original videotape.  The hospital will not be responsible for storage or maintenance (Date)                                (Time)  STATEMENT OF PHYSICIAN My signature below affirms that prior to the time of the procedure; I have explained to the patient and/or his/her legal representative, the risk

## (undated) NOTE — LETTER
[facility]  Administration of Anesthesia  1. I, Stanley Sumner, or _________________________________ acting on his behalf, (Patient) (Dependent/Representative) request to receive anesthesia for my pending procedure/operation/treatment. infections, high spinal block, spinal bleeding, seizure, cardiac arrest and death. 7. AWARENESS: I understand that it is possible (but unlikely) to have explicit memory of events from the operating room while under general anesthesia.   8. ELECTROCONVULSIV unconscious pt /Relationship    My signature below affirms that prior to the time of the procedure, I have explained to the patient and/or his/her guardian, the risks and benefits of undergoing anesthesia, as well as any reasonable alternatives.     _______

## (undated) NOTE — IP AVS SNAPSHOT
Patient Demographics     Address  [address] [place] [address] [address] [place]  [address] Phone  [phone] [facility])      Emergency Contact(s)     Name Relation Home Work Mobile    klarissa bertrand Spouse   [phone]      Allergies as of 2/4/2020  Review s water.  sutures removed 02/03   3. Continue IV abx course under ID service recommendations  4.   Follow up in 3-4 week in the office for x-ray of left hip.   Stefania Hebert NP-C with Dr. John Dominguez   [phone]    Clean sternal and right leg incision daily 1 tablet (3.125 mg total) by Per G Tube route 2 (two) times daily with meals.    Stephanie Milner MD         DAPTOmycin 50 mg/mL in Sterile Water for Injection  Next dose due:  04/07 noon   Notes to patient:  Every other day until 2/21/20      Inject 10 m Gabrielle Guillen. Xander Pacheco MD         omeprazole 2mg/ml Susp  Commonly known as:  PRILOSEC  Start taking on:  February 5, 2020  Next dose due:  tomorrow      20 mL (40 mg total) by Per G Tube route daily. Gabrielle Guillen.  MD Leny         PEG 3350 Pack  Commonly known Order ID Medication Name Action Time Action Reason Comments    [id number] ALPRAZolam Ramin Cole) tab 0.25 mg 02/03/20 2137 Given      [id number] ALPRAZolam (XANAX) tab 0.25 mg 02/04/20 0514 Given      [id number] Chlorhexidine Gluconate (PERIDEX) 0.12 % solution [id number] ferrous sulfate 300 (60 Fe) MG/5ML syrup 300 mg 02/03/20 2137 Given      [id number] ferrous sulfate 300 (60 Fe) MG/5ML syrup 300 mg 02/04/20 0814 Given      [id number] ipratropium-albuterol (DUONEB) nebulizer solution 3 mL 02/03/20 1516 Given Components    Component Value Reference Range Flag Lab   PT 34.3 11.8 - 14.5 seconds [facility]   Comment:         Elevations of the PT and/or INR in patients not  receiving anticoagulant therapy may be seen in  factor deficiency, vitamin K deficie Blood Culture FREQ X 2 [[id number]] Collected:  01/21/20 2204    Order Status:  Completed Lab Status:  Final result Updated:  01/26/20 1100    Specimen:  Blood,peripheral      Blood Culture Result No Growth 5 Days    Tissue Aerobic Culture [[id number]] Sherri Anaerobic Culture [[id number]] Collected:  01/10/20 1235    Order Status:  Completed Lab Status:  Final result Updated:  01/15/20 1147    Specimen:  Tissue from Hip, left      Anaerobic Culture No Anaerobes isolated    MRSA Screen by PCR Once [[id number]] Pravastatin Sodium 40 MG Oral Tab, Take 40 mg by mouth nightly., Disp: , Rfl: , 1/9/2020 at 2100  tamsulosin HCl 0.4 MG Oral Cap, Take 0.8 mg by mouth daily. , Disp: , Rfl: , 1/9/2020 at 2100  carvedilol 25 MG Oral Tab, Take 12.5 mg by mouth 2 (two) times d lactated ringers infusion, , Intravenous, Continuous  metoprolol Tartrate (LOPRESSOR) tab 25 mg, 25 mg, Oral, Once PRN  famoTIDine (PEPCID) tab 20 mg, 20 mg, Oral, Once        Allergies: Patient has no known allergies.     Past Medical History:   Diagnosis Consults signed by Gia Simon MD at 1/27/2020 10:25 AM      Author:  Gia Simon MD Service:  ENT Author Type:  Physician    Filed:  1/27/2020 10:25 AM Status:  Signed    :  Gia Simon MD (Physician)       Peter Navarro 42 infarction and cardiac arrest following hip surgery. He has not been able to be weaned from the ventilator. I agree with the need for a tracheostomy and we will plan to proceed with this procedure tomorrow.   I discussed the situation with Dr. 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 - Potential duplicate medications found. Please discuss with provider. Home Meds - Modified    mirtazapine 15 MG Oral Tab  Take 1 tablet (15 mg total) by mouth nightly as needed (insomnia).     carvedilol 3.125 MG Oral Tab  1 tablet (3.125 mg total) instructed to contact the office if increasing drainage, redness, or swelling to the operative wound/extremity occurs. Similarly, if they develop fevers and chills they should call the office ASAP.     The patient will continue to wear JT hose to both leg on OVP while on IV antibiotics. Patient to follow up with Dr. Rico Platt his primary ID physician          Cornelio Zafar. Leny  2/4/2020[AR.1]    Electronically signed by Luther Cook MD on 2/4/2020 11:13 AM   Attribution Smith    AR. 1 - Luther Cook MD on 2 & chest tube), management of lift, and caring for pt's LLE in order to adhere to posterior hip precautions. Plan was to work on sit to stand transfer from the recliner chair (seat had been elevated). However, pt was fatigued and declined.  Pt was educat How much difficulty does the patient currently have. ..[ST.1]  -   Turning over in bed (including adjusting bedclothes, sheets and blankets)?: A Lot   -   Sitting down on and standing up from a chair with arms (e.g., wheelchair, bedside commode, etc.): Unab Current Status IN PROGRESS   Goal #6 Patient is able to stand 2 min with UE support at [facility] for balance to facilitate participation in ADLs   Goal #6  Current Status NT today[ST. 1]            Attribution Key    ST. 1 - Rhonda Dominguez PT on 2/3/2020  5:22 skilled physical therapy while in-house to address pts impairments and functional limitations. HEP was reviewed with emmanuelle barrera.   He was encouraged to perform his HEP everyday and to sit up on the chair with nursing assistance using the mechanical lift t -   Turning over in bed (including adjusting bedclothes, sheets and blankets)?: A Lot   -   Sitting down on and standing up from a chair with arms (e.g., wheelchair, bedside commode, etc.): Unable   -   Moving from lying on back to sitting on the side of t Goal #5 Pt able to recall and maintain all sternal precautions to ensure safety and proper healing   Goal #5   Current Status IN PROGRESS   Goal #6 Patient is able to stand 2 min with UE support at [facility] for balance to facilitate participation in ADLs   Goal precautions. He is able to assist with BLE during bed mobility. Today, pt needed Mod A for sitting balance as he demonstrated posterior lean. He needed up to Max A for sitting balance as he fatigued while clinicians were working to Pawel Garcias.  Unable t PAIN ASSESSMENT[ST.1]   Ratin  Location: N/A  Management Techniques: (/)[ST.2]    BALANCE[ST.1]                                                                                                                     Static Sitting: Poor  Dynamic Sitting: P Current Status NT today   Goal #3 Patient is able to ambulate 25 feet with assist device: walker - rolling at assistance level: minimum assistance   Goal #3   Current Status NT   Goal #4 Pt able to recall and maintain all hip precautions to ensure safety a max a x2 for rolling left and right in bed. He was dependently lifted via guldman to bedside chair taking care to prevent breaking any posterior hip precautions. Patient was positioned in chair for comfort.  Patient requested 5 minutes of seated rest prior How much help from another person does the patient currently need…  -   Putting on and taking off regular lower body clothing?: Total  -   Bathing (including washing, rinsing, drying)?: Total  -   Toileting, which includes using toilet, bedpan or urinal? : AO.1 Carolyn Hightower, OT on 2/3/2020 12:18 PM  AO. 2 - Puma Nascimento, OT on 2/3/2020 12:36 PM               Occupational Therapy Note signed by Tamica Scruggs OT at 2/1/2020  3:46 PM  Version 1 of 1    Author:  Tamica Scruggs OT Service:  Rehab Au Degree of Impairment: 85.69% has been calculated based on documentation in the [facility] '6 clicks' Inpatient Daily Activity Short Form. Research supports that patients with this level of impairment may benefit from LTAC.   Based on pt's prior functional statu Bedroom Mobility: dependent    BALANCE ASSESSMENT  Static Sitting: mod to max A  Dynamic Sitting: max A  Static Standing: NT  Dynamic Standing: NT    FUNCTIONAL ADL ASSESSMENT  Grooming: mod A  Feeding: dependent  Bathing: max A  Toileting: dependent  Uppe Speech-Language Pathologist  Lucy [address] Dr. 1]      Electronically signed by BRUNA Mcdaniel on 1/25/2020 10:19 AM   Attribution [id number] 1 - BRUNA Mcdaniel on 1/25/2020 10:17 AM                     Future Appoi

## (undated) NOTE — LETTER
[address], [place], [place]     AUTHORIZATION FOR SURGICAL OPERATION OR PROCEDURE    I hereby authorize Arlinda Snellen, MD, my Physician(s) and whomever may be designated as the doctor's Assistant, to perform the followi diseases such as hepatitis, AIDS, cytomegalovirus (CMV),and fluid overload. In the event that I wish to have an autologous transfusion of my own blood, or a directed donor transfusion, I will discuss this with my Physician.   4. I consent to the photographi __________________________________________  (Responsible person in case of minor or unconscious patient)   (Relationship to Patient)    _______________________________________________________________ ____________________________  (Witness signature)

## (undated) NOTE — LETTER
[address] [place] [address] Rd, [place], [place]     AUTHORIZATION FOR SURGICAL OPERATION OR PROCEDURE    I hereby authorize Dr. Andry Heredia, my Physician(s) and whomever may be designated as the doctor's Assistant, to perform the followi 4. I consent to the photographing of procedure(s) to be performed for the purposes of advancing medicine, science and/or education, provided my identity is not revealed.  If the procedure has been videotaped, the physician/surgeon will obtain the original v (Witness signature)                                                                                                  (Date)                                (Time)  STATEMENT OF PHYSICIAN My signature below affirms that prior to the time of the procedure;  I

## (undated) NOTE — LETTER
[address] [place] [address]  Authorization for Invasive Procedures  1.  I hereby authorize Dr. Chari Aguilar , my physician and whomever may be designated as the doctor's assistant, to perform the following operation and/or procedure:CT guided lef performed for the purposes of advancing medicine, science, and/or education, provided my identity is not revealed. If the procedure has been videotaped, the physician/surgeon will obtain the original videotape.  The hospital will not be responsible for stor My signature below affirms that prior to the time of the procedure, I have explained to the patient and/or his legal representative, the risks and benefits involved in the proposed treatment and any reasonable alternative to the proposed treatment.  I have

## (undated) NOTE — LETTER
[facility]  Administration of Anesthesia  1. I, Terrance Lilly, or _________________________________ acting on his behalf, (Patient) (Dependent/Representative) request to receive anesthesia for my pending procedure/operation/treatment. infections, high spinal block, spinal bleeding, seizure, cardiac arrest and death. 7. AWARENESS: I understand that it is possible (but unlikely) to have explicit memory of events from the operating room while under general anesthesia.   8. ELECTROCONVULSIV unconscious pt /Relationship    My signature below affirms that prior to the time of the procedure, I have explained to the patient and/or his/her guardian, the risks and benefits of undergoing anesthesia, as well as any reasonable alternatives.     _______

## (undated) NOTE — LETTER
[address] [place] [address] Rd, [place], [place]     AUTHORIZATION FOR SURGICAL OPERATION OR PROCEDURE    I hereby authorize  , my Physician(s) and whomever may be designated as the doctor's Assistant, to perform the following operation an diseases such as hepatitis, AIDS, cytomegalovirus (CMV),and fluid overload. In the event that I wish to have an autologous transfusion of my own blood, or a directed donor transfusion, I will discuss this with my Physician.   4. I consent to the photographi __________________________________________  (Responsible person in case of minor or unconscious patient)   (Relationship to Patient)    _______________________________________________________________ ____________________________  (Witness signature)

## (undated) NOTE — IP AVS SNAPSHOT
[facility]            (For Outpatient Use Only) Initial Admit Date: 1/10/2020   Inpt/Obs Admit Date: Inpt: 1/10/20 / Obs: N/A   Discharge Date:    Dimas Proper:  [de-identified]   MRN: [de-identified]   CSN: [id number]   CEID: [id number]        MZ[facility] Subscriber Name:  Barak Lyn :    Subscriber ID:  Pt Rel to Subscriber:    Hospital Account Financial Class: Medicare    2020

## (undated) NOTE — LETTER
[facility] Sch[facility]acher [address], [place], [place]  [id number]  INFORMED CONSENT FOR TRANSFUSION OF BLOOD OR BLOOD PRODUCTS  My physician has informed me of the nature, purpose, benefits and risks of transfusion for blood and blood components that ______________________________________________  (Signature of Patient)                                                            (Responsible party in case of Minor,

## (undated) NOTE — LETTER
[facility] [address]  [facility] [place], [place]  [id number]  INFORMED CONSENT FOR TRANSFUSION OF BLOOD OR BLOOD PRODUCTS  My physician has informed me of the nature, purpose, benefits and risks of transfusion for blood and blood components that ______________________________________________  (Signature of Patient)                                                            (Responsible party in case of Minor,

## (undated) NOTE — LETTER
[address], [place]  Authorization for Invasive Procedures  1.  I hereby authorize Dr. Jimmie Saeed , my physician and whomever may be designated as the doctor's assistant, to perform the following operation and/or procedure:  Bronchosc performed for the purposes of advancing medicine, science, and/or education, provided my identity is not revealed. If the procedure has been videotaped, the physician/surgeon will obtain the original videotape.  The hospital will not be responsible for stor My signature below affirms that prior to the time of the procedure, I have explained to the patient and/or his legal representative, the risks and benefits involved in the proposed treatment and any reasonable alternative to the proposed treatment.  I have

## (undated) NOTE — LETTER
[address], [place], [place]     AUTHORIZATION FOR SURGICAL OPERATION OR PROCEDURE    I hereby authorize Dr. Eladia Matthews, my Physician(s) and whomever may be designated as the doctor's Assistant, to perform the following operati purposes of advancing medicine, science and/or education, provided my identity is not revealed. If the procedure has been videotaped, the physician/surgeon will obtain the original videotape.  The hospital will not be responsible for storage or maintenance (Date)                                (Time)  STATEMENT OF PHYSICIAN My signature below affirms that prior to the time of the procedure; I have explained to the patient and/or his/her legal representative, the risk

## (undated) NOTE — LETTER
[address], [place]  Authorization for Invasive Procedures  1.  I hereby authorize Dr. Carrington Hong , my physician and whomever may be designated as the doctor's assistant, to perform the following operation and/or procedure:  Cardiac C 5. I consent to the photographing of the operations or procedures to be performed for the purposes of advancing medicine, science, and/or education, provided my identity is not revealed.  If the procedure has been videotaped, the physician/surgeon will obta __________ Time: ___________    Statement of Physician  My signature below affirms that prior to the time of the procedure, I have explained to the patient and/or his legal representative, the risks and benefits involved in the proposed treatment and any r

## (undated) NOTE — LETTER
[address], [place]  Authorization for Invasive Procedures  1.  I hereby authorize Pedro Luis Hayward , my physician and whomever may be designated as the doctor's assistant, to perform the following operation and/or procedure: Perc performed for the purposes of advancing medicine, science, and/or education, provided my identity is not revealed. If the procedure has been videotaped, the physician/surgeon will obtain the original videotape.  The hospital will not be responsible for stor My signature below affirms that prior to the time of the procedure, I have explained to the patient and/or his legal representative, the risks and benefits involved in the proposed treatment and any reasonable alternative to the proposed treatment.  I have

## (undated) NOTE — LETTER
[address], [place]  Authorization for Invasive Procedures  1.  I hereby authorize Jorge Ferro , my physician and whomever may be designated as the doctor's assistant, to perform the following operation and/or procedure:  Eso 5. I consent to the photographing of the operations or procedures to be performed for the purposes of advancing medicine, science, and/or education, provided my identity is not revealed.  If the procedure has been videotaped, the physician/surgeon will obta __________ Time: ___________    Statement of Physician  My signature below affirms that prior to the time of the procedure, I have explained to the patient and/or his legal representative, the risks and benefits involved in the proposed treatment and any r